# Patient Record
Sex: FEMALE | Race: BLACK OR AFRICAN AMERICAN | NOT HISPANIC OR LATINO | Employment: UNEMPLOYED | URBAN - METROPOLITAN AREA
[De-identification: names, ages, dates, MRNs, and addresses within clinical notes are randomized per-mention and may not be internally consistent; named-entity substitution may affect disease eponyms.]

---

## 2017-05-22 ENCOUNTER — HOSPITAL ENCOUNTER (EMERGENCY)
Facility: HOSPITAL | Age: 27
Discharge: HOME | End: 2017-05-22
Attending: EMERGENCY MEDICINE
Payer: MEDICAID

## 2017-05-22 VITALS
BODY MASS INDEX: 25.95 KG/M2 | OXYGEN SATURATION: 100 % | DIASTOLIC BLOOD PRESSURE: 76 MMHG | HEIGHT: 64 IN | TEMPERATURE: 98 F | HEART RATE: 81 BPM | WEIGHT: 152 LBS | SYSTOLIC BLOOD PRESSURE: 127 MMHG | RESPIRATION RATE: 18 BRPM

## 2017-05-22 DIAGNOSIS — O02.1 RETAINED PRODUCTS OF CONCEPTION, EARLY PREGNANCY: Primary | ICD-10-CM

## 2017-05-22 DIAGNOSIS — A59.9 TRICHOMONOSIS: ICD-10-CM

## 2017-05-22 LAB
ABO + RH BLD: NORMAL
ALBUMIN SERPL BCP-MCNC: 4 G/DL
ALP SERPL-CCNC: 55 U/L
ALT SERPL W/O P-5'-P-CCNC: 8 U/L
ANION GAP SERPL CALC-SCNC: 7 MMOL/L
AST SERPL-CCNC: 14 U/L
B-HCG UR QL: POSITIVE
BACTERIA GENITAL QL WET PREP: ABNORMAL
BASOPHILS # BLD AUTO: 0.01 K/UL
BASOPHILS NFR BLD: 0.3 %
BILIRUB SERPL-MCNC: 0.6 MG/DL
BUN SERPL-MCNC: 8 MG/DL
CALCIUM SERPL-MCNC: 9.4 MG/DL
CHLORIDE SERPL-SCNC: 105 MMOL/L
CLUE CELLS VAG QL WET PREP: ABNORMAL
CO2 SERPL-SCNC: 26 MMOL/L
CREAT SERPL-MCNC: 0.8 MG/DL
CTP QC/QA: YES
DIFFERENTIAL METHOD: ABNORMAL
EOSINOPHIL # BLD AUTO: 0.2 K/UL
EOSINOPHIL NFR BLD: 4.6 %
ERYTHROCYTE [DISTWIDTH] IN BLOOD BY AUTOMATED COUNT: 13.8 %
EST. GFR  (AFRICAN AMERICAN): >60 ML/MIN/1.73 M^2
EST. GFR  (NON AFRICAN AMERICAN): >60 ML/MIN/1.73 M^2
FILAMENT FUNGI VAG WET PREP-#/AREA: ABNORMAL
GLUCOSE SERPL-MCNC: 85 MG/DL
HCG INTACT+B SERPL-ACNC: 2831 MIU/ML
HCT VFR BLD AUTO: 37.8 %
HGB BLD-MCNC: 12.6 G/DL
LYMPHOCYTES # BLD AUTO: 1.3 K/UL
LYMPHOCYTES NFR BLD: 36.3 %
MCH RBC QN AUTO: 29.3 PG
MCHC RBC AUTO-ENTMCNC: 33.3 %
MCV RBC AUTO: 88 FL
MONOCYTES # BLD AUTO: 0.4 K/UL
MONOCYTES NFR BLD: 11.2 %
NEUTROPHILS # BLD AUTO: 1.7 K/UL
NEUTROPHILS NFR BLD: 47.6 %
PLATELET # BLD AUTO: 201 K/UL
PMV BLD AUTO: 12.4 FL
POTASSIUM SERPL-SCNC: 3.8 MMOL/L
PROT SERPL-MCNC: 6.9 G/DL
RBC # BLD AUTO: 4.3 M/UL
SODIUM SERPL-SCNC: 138 MMOL/L
SPECIMEN SOURCE: ABNORMAL
T VAGINALIS GENITAL QL WET PREP: ABNORMAL
WBC # BLD AUTO: 3.66 K/UL
WBC #/AREA VAG WET PREP: ABNORMAL
YEAST GENITAL QL WET PREP: ABNORMAL

## 2017-05-22 PROCEDURE — 86900 BLOOD TYPING SEROLOGIC ABO: CPT

## 2017-05-22 PROCEDURE — 99284 EMERGENCY DEPT VISIT MOD MDM: CPT

## 2017-05-22 PROCEDURE — 86901 BLOOD TYPING SEROLOGIC RH(D): CPT

## 2017-05-22 PROCEDURE — 81025 URINE PREGNANCY TEST: CPT | Performed by: EMERGENCY MEDICINE

## 2017-05-22 PROCEDURE — 85025 COMPLETE CBC W/AUTO DIFF WBC: CPT

## 2017-05-22 PROCEDURE — 87210 SMEAR WET MOUNT SALINE/INK: CPT

## 2017-05-22 PROCEDURE — 84702 CHORIONIC GONADOTROPIN TEST: CPT

## 2017-05-22 PROCEDURE — 80053 COMPREHEN METABOLIC PANEL: CPT

## 2017-05-22 PROCEDURE — 87591 N.GONORRHOEAE DNA AMP PROB: CPT

## 2017-05-22 RX ORDER — METRONIDAZOLE 500 MG/1
500 TABLET ORAL 3 TIMES DAILY
Qty: 21 TABLET | Refills: 0 | Status: SHIPPED | OUTPATIENT
Start: 2017-05-22 | End: 2017-05-29

## 2017-05-22 NOTE — ED PROVIDER NOTES
"Encounter Date: 2017    SCRIBE #1 NOTE: I, Garcia Linda, am scribing for, and in the presence of,  Geeta Ortiz NP. I have scribed the following portions of the note - Other sections scribed: PEPPER HPI.       History     Chief Complaint   Patient presents with    Miscarriage     " I passed my baby in the toilet May 3rd and I'm still bleeding and cramping and I took a pregancy test and it still says I'm pregnant."      Review of patient's allergies indicates:  No Known Allergies  CC: Abdominal pain    HPI: This 26 y.o. F, who has no past medical history, presents to the ED for evaluation of vaginal bleeding since 5/3 and intermittent, crampy/contraction-like suprapubic abdominal pain x1 week. She passed two large clots of tissue at onset and has been bleeding intermittently since. At home pregnancy test were postive so she assumed she was having a miscarriage. She is not sure how long she has been pregnant. She is A2. She denies shortness of breath, chest pain, fever, nausea, vomiting and headache.          History reviewed. No pertinent past medical history.  Past Surgical History:   Procedure Laterality Date    FRACTURE SURGERY      left leg    LEG SURGERY       Family History   Problem Relation Age of Onset    Hypertension Mother     Hypertension Maternal Grandmother     Kidney disease Maternal Grandmother     Diabetes Maternal Grandmother     Diabetes Paternal Grandmother     Hypertension Paternal Grandmother     Kidney disease Paternal Grandmother     Breast cancer Neg Hx     Colon cancer Neg Hx     Ovarian cancer Neg Hx      Social History   Substance Use Topics    Smoking status: Former Smoker     Packs/day: 0.50     Years: 6.00     Types: Cigarettes    Smokeless tobacco: Former User     Quit date: 2014      Comment: Pt smokes 4 cigarettes per day.    Alcohol use No      Comment: sometimes     Review of Systems   Constitutional: Negative for fever.   HENT: Negative for sore " throat.    Eyes: Negative for visual disturbance.   Respiratory: Negative for shortness of breath.    Cardiovascular: Negative for chest pain.   Gastrointestinal: Positive for abdominal pain (lower). Negative for diarrhea, nausea and vomiting.   Genitourinary: Positive for vaginal bleeding. Negative for dysuria.   Musculoskeletal: Negative for back pain.   Skin: Negative for rash.   Neurological: Negative for headaches.       Physical Exam     Initial Vitals [05/22/17 1152]   BP Pulse Resp Temp SpO2   116/71 77 17 98.1 °F (36.7 °C) 99 %     Physical Exam    Nursing note and vitals reviewed.  Constitutional: She appears well-developed and well-nourished.   HENT:   Head: Normocephalic.   Eyes: Conjunctivae are normal.   Neck: Normal range of motion. Neck supple.   Abdominal: Soft. There is no tenderness.   Genitourinary: Vagina normal and uterus normal.   Genitourinary Comments: Os closed. (-)  Cervical motion tenderness.  No adnexal masses no tenderness.   Musculoskeletal: Normal range of motion.   Neurological: She is alert and oriented to person, place, and time.   Skin: Skin is warm and dry. Capillary refill takes less than 2 seconds.         ED Course   Procedures  Labs Reviewed   CBC W/ AUTO DIFFERENTIAL - Abnormal; Notable for the following:        Result Value    WBC 3.66 (*)     Gran # 1.7 (*)     All other components within normal limits   COMPREHENSIVE METABOLIC PANEL - Abnormal; Notable for the following:     ALT 8 (*)     Anion Gap 7 (*)     All other components within normal limits   VAGINAL SCREEN - Abnormal; Notable for the following:     Trichomonas Moderate (*)     Clue Cells, Wet Prep Moderate (*)     WBC - Vaginal Screen Few (*)     Bacteria - Vaginal Screen Moderate (*)     All other components within normal limits   POCT URINE PREGNANCY - Abnormal; Notable for the following:     POC Preg Test, Ur Positive (*)     All other components within normal limits   C. TRACHOMATIS/N. GONORRHOEAE BY AMP DNA    HCG, QUANTITATIVE, PREGNANCY   GROUP & RH             Medical Decision Making:   Initial Assessment:   26-year-old female presents with continued cramping and vaginal bleeding intermittently since May 3.  Patient continues to have positive pregnancy test  Differential Diagnosis:   Missed AB  Retained products  Threatened AB  ED Management:  Pts exam was unremarkable; pt does not appear ill or toxic, pt is afebrile with normal VS, os was closed there was no adnexal masses or tenderness.  Uterus was normal size.    Labs and ultrasound were reviewed.     Based on exam today I believe patient had a missed AB with retained products.  She needs to follow-up with her OB/GYN for continued management.    I will put patient on Flagyl for the Trichomonas and BV.    Patient verbalizes understanding to include follow-up with OB/GYN for management. patient will return for worsening symptoms or any other concerns    Case discussed with attending who agrees with assessment and plan.               Scribe Attestation:   Scribe #1: I performed the above scribed service and the documentation accurately describes the services I performed. I attest to the accuracy of the note.    Attending Attestation:     Physician Attestation Statement for NP/PA:   I discussed this assessment and plan of this patient with the NP/PA, but I did not personally examine the patient. The face to face encounter was performed by the NP/PA.    Other NP/PA Attestation Additions:      Medical Decision Makin-year-old female presents for evaluation of vaginal bleeding for 2 weeks of cramping abdominal pain for the last week.  Physical examination reveals a benign abdomen, normal vital signs, well-appearing patient.  Her urine pregnancy test is positive in the emergency department.  Will evaluate for spontaneous AB and ectopic pregnancy.     I have personally reviewed and interpreted the patient's ultrasound and there is no intrauterine pregnancy but appears  to be retained products of conception within the uterus.    Wet prep positive for moderate clue cells and moderate trichomonas.  Will treat with Flagyl and recommend follow-up with OB/GYN for probable retained products of conception.  No evidence of endometritis or significant anemia.       Physician Attestation for Scribe:  Physician Attestation Statement for Scribe #1: I, Geeta Ortiz NP, reviewed documentation, as scribed by Garcia Linda in my presence, and it is both accurate and complete.                 ED Course   Value Comment By Time   US OB Less Than 14 Wks with Transvag(xpd (Reviewed) Geeta Ortiz NP 05/22 1530     Clinical Impression:   The primary encounter diagnosis was Retained products of conception, early pregnancy. A diagnosis of Trichomonosis was also pertinent to this visit.    Disposition:   Disposition: Discharged  Condition: Stable       Geeta Ortiz NP  05/22/17 1607       Indra Ayala III, MD  05/22/17 1829

## 2017-05-22 NOTE — DISCHARGE INSTRUCTIONS
You may need a D&C.  You need to follow up with your OB/GYN in the next day or two for him to follow your progress.

## 2017-05-22 NOTE — ED TRIAGE NOTES
"Patient came in PER personal transport with bleeding and cramping with blood clots after having a miscarriage on May 3rd. "  "

## 2017-05-23 LAB
C TRACH DNA SPEC QL NAA+PROBE: NOT DETECTED
N GONORRHOEA DNA SPEC QL NAA+PROBE: NOT DETECTED

## 2017-06-12 ENCOUNTER — HOSPITAL ENCOUNTER (INPATIENT)
Facility: HOSPITAL | Age: 27
LOS: 1 days | Discharge: LEFT BEFORE DISCHARGE | DRG: 770 | End: 2017-06-13
Attending: EMERGENCY MEDICINE | Admitting: OBSTETRICS & GYNECOLOGY
Payer: MEDICAID

## 2017-06-12 ENCOUNTER — ANESTHESIA EVENT (OUTPATIENT)
Dept: SURGERY | Facility: HOSPITAL | Age: 27
DRG: 770 | End: 2017-06-12
Payer: MEDICAID

## 2017-06-12 ENCOUNTER — SURGERY (OUTPATIENT)
Age: 27
End: 2017-06-12

## 2017-06-12 ENCOUNTER — ANESTHESIA (OUTPATIENT)
Dept: SURGERY | Facility: HOSPITAL | Age: 27
DRG: 770 | End: 2017-06-12
Payer: MEDICAID

## 2017-06-12 DIAGNOSIS — R74.01 ELEVATED TRANSAMINASE LEVEL: ICD-10-CM

## 2017-06-12 DIAGNOSIS — O03.87 ABORTION WITH SEPTICEMIA: Primary | ICD-10-CM

## 2017-06-12 LAB
ALBUMIN SERPL BCP-MCNC: 3.8 G/DL
ALP SERPL-CCNC: 58 U/L
ALT SERPL W/O P-5'-P-CCNC: 59 U/L
ANION GAP SERPL CALC-SCNC: 12 MMOL/L
AST SERPL-CCNC: 60 U/L
BASOPHILS # BLD AUTO: 0.01 K/UL
BASOPHILS NFR BLD: 0.1 %
BILIRUB SERPL-MCNC: 0.9 MG/DL
BUN SERPL-MCNC: 8 MG/DL
CALCIUM SERPL-MCNC: 9.3 MG/DL
CHLORIDE SERPL-SCNC: 98 MMOL/L
CO2 SERPL-SCNC: 22 MMOL/L
CREAT SERPL-MCNC: 0.9 MG/DL
DIFFERENTIAL METHOD: ABNORMAL
EOSINOPHIL # BLD AUTO: 0 K/UL
EOSINOPHIL NFR BLD: 0 %
ERYTHROCYTE [DISTWIDTH] IN BLOOD BY AUTOMATED COUNT: 13.6 %
EST. GFR  (AFRICAN AMERICAN): >60 ML/MIN/1.73 M^2
EST. GFR  (NON AFRICAN AMERICAN): >60 ML/MIN/1.73 M^2
GLUCOSE SERPL-MCNC: 129 MG/DL
HCT VFR BLD AUTO: 40.5 %
HGB BLD-MCNC: 13.7 G/DL
INR PPP: 1.3
LYMPHOCYTES # BLD AUTO: 0.5 K/UL
LYMPHOCYTES NFR BLD: 6.8 %
MCH RBC QN AUTO: 28.8 PG
MCHC RBC AUTO-ENTMCNC: 33.8 %
MCV RBC AUTO: 85 FL
MONOCYTES # BLD AUTO: 0.5 K/UL
MONOCYTES NFR BLD: 7.1 %
NEUTROPHILS # BLD AUTO: 6 K/UL
NEUTROPHILS NFR BLD: 86 %
PLATELET # BLD AUTO: 143 K/UL
PMV BLD AUTO: 12 FL
POTASSIUM SERPL-SCNC: 3.7 MMOL/L
PROT SERPL-MCNC: 7.9 G/DL
PROTHROMBIN TIME: 13.6 SEC
RBC # BLD AUTO: 4.76 M/UL
SODIUM SERPL-SCNC: 132 MMOL/L
WBC # BLD AUTO: 7.07 K/UL

## 2017-06-12 PROCEDURE — 63600175 PHARM REV CODE 636 W HCPCS: Performed by: OBSTETRICS & GYNECOLOGY

## 2017-06-12 PROCEDURE — 37000009 HC ANESTHESIA EA ADD 15 MINS: Performed by: OBSTETRICS & GYNECOLOGY

## 2017-06-12 PROCEDURE — 71000033 HC RECOVERY, INTIAL HOUR: Performed by: OBSTETRICS & GYNECOLOGY

## 2017-06-12 PROCEDURE — D9220A PRA ANESTHESIA: Mod: ANES,,, | Performed by: ANESTHESIOLOGY

## 2017-06-12 PROCEDURE — 88305 TISSUE EXAM BY PATHOLOGIST: CPT | Performed by: PATHOLOGY

## 2017-06-12 PROCEDURE — 11000001 HC ACUTE MED/SURG PRIVATE ROOM

## 2017-06-12 PROCEDURE — 63600175 PHARM REV CODE 636 W HCPCS: Performed by: NURSE ANESTHETIST, CERTIFIED REGISTERED

## 2017-06-12 PROCEDURE — 85610 PROTHROMBIN TIME: CPT

## 2017-06-12 PROCEDURE — 99284 EMERGENCY DEPT VISIT MOD MDM: CPT

## 2017-06-12 PROCEDURE — 80053 COMPREHEN METABOLIC PANEL: CPT

## 2017-06-12 PROCEDURE — 36000704 HC OR TIME LEV I 1ST 15 MIN: Performed by: OBSTETRICS & GYNECOLOGY

## 2017-06-12 PROCEDURE — 37000008 HC ANESTHESIA 1ST 15 MINUTES: Performed by: OBSTETRICS & GYNECOLOGY

## 2017-06-12 PROCEDURE — 25000003 PHARM REV CODE 250: Performed by: NURSE ANESTHETIST, CERTIFIED REGISTERED

## 2017-06-12 PROCEDURE — 85025 COMPLETE CBC W/AUTO DIFF WBC: CPT

## 2017-06-12 PROCEDURE — 25000003 PHARM REV CODE 250: Performed by: OBSTETRICS & GYNECOLOGY

## 2017-06-12 PROCEDURE — 88305 TISSUE EXAM BY PATHOLOGIST: CPT | Mod: 26,,, | Performed by: PATHOLOGY

## 2017-06-12 PROCEDURE — D9220A PRA ANESTHESIA: Mod: CRNA,,, | Performed by: NURSE ANESTHETIST, CERTIFIED REGISTERED

## 2017-06-12 PROCEDURE — 36000705 HC OR TIME LEV I EA ADD 15 MIN: Performed by: OBSTETRICS & GYNECOLOGY

## 2017-06-12 PROCEDURE — 10D17ZZ EXTRACTION OF PRODUCTS OF CONCEPTION, RETAINED, VIA NATURAL OR ARTIFICIAL OPENING: ICD-10-PCS | Performed by: OBSTETRICS & GYNECOLOGY

## 2017-06-12 RX ORDER — LIDOCAINE HCL/PF 100 MG/5ML
SYRINGE (ML) INTRAVENOUS
Status: DISCONTINUED | OUTPATIENT
Start: 2017-06-12 | End: 2017-06-12

## 2017-06-12 RX ORDER — SUCCINYLCHOLINE CHLORIDE 20 MG/ML
INJECTION INTRAMUSCULAR; INTRAVENOUS
Status: DISCONTINUED | OUTPATIENT
Start: 2017-06-12 | End: 2017-06-12

## 2017-06-12 RX ORDER — SODIUM CHLORIDE 0.9 % (FLUSH) 0.9 %
3 SYRINGE (ML) INJECTION
Status: DISCONTINUED | OUTPATIENT
Start: 2017-06-12 | End: 2017-06-12 | Stop reason: HOSPADM

## 2017-06-12 RX ORDER — OXYCODONE AND ACETAMINOPHEN 10; 325 MG/1; MG/1
1 TABLET ORAL EVERY 4 HOURS PRN
Status: DISCONTINUED | OUTPATIENT
Start: 2017-06-12 | End: 2017-06-13 | Stop reason: HOSPADM

## 2017-06-12 RX ORDER — HYDROMORPHONE HYDROCHLORIDE 2 MG/ML
0.5 INJECTION, SOLUTION INTRAMUSCULAR; INTRAVENOUS; SUBCUTANEOUS EVERY 4 HOURS PRN
Status: DISCONTINUED | OUTPATIENT
Start: 2017-06-12 | End: 2017-06-12

## 2017-06-12 RX ORDER — METOCLOPRAMIDE HYDROCHLORIDE 5 MG/ML
INJECTION INTRAMUSCULAR; INTRAVENOUS
Status: DISCONTINUED | OUTPATIENT
Start: 2017-06-12 | End: 2017-06-12

## 2017-06-12 RX ORDER — PROPOFOL 10 MG/ML
VIAL (ML) INTRAVENOUS
Status: DISCONTINUED | OUTPATIENT
Start: 2017-06-12 | End: 2017-06-12

## 2017-06-12 RX ORDER — ROCURONIUM BROMIDE 10 MG/ML
INJECTION, SOLUTION INTRAVENOUS
Status: DISCONTINUED | OUTPATIENT
Start: 2017-06-12 | End: 2017-06-12

## 2017-06-12 RX ORDER — FENTANYL CITRATE 50 UG/ML
INJECTION, SOLUTION INTRAMUSCULAR; INTRAVENOUS
Status: DISCONTINUED | OUTPATIENT
Start: 2017-06-12 | End: 2017-06-12

## 2017-06-12 RX ORDER — DIPHENHYDRAMINE HYDROCHLORIDE 50 MG/ML
25 INJECTION INTRAMUSCULAR; INTRAVENOUS EVERY 4 HOURS PRN
Status: DISCONTINUED | OUTPATIENT
Start: 2017-06-12 | End: 2017-06-13 | Stop reason: HOSPADM

## 2017-06-12 RX ORDER — HYDROCODONE BITARTRATE AND ACETAMINOPHEN 5; 325 MG/1; MG/1
1 TABLET ORAL EVERY 4 HOURS PRN
Status: DISCONTINUED | OUTPATIENT
Start: 2017-06-12 | End: 2017-06-13 | Stop reason: HOSPADM

## 2017-06-12 RX ORDER — GLYCOPYRROLATE 0.2 MG/ML
INJECTION INTRAMUSCULAR; INTRAVENOUS
Status: DISCONTINUED | OUTPATIENT
Start: 2017-06-12 | End: 2017-06-12

## 2017-06-12 RX ORDER — MIDAZOLAM HYDROCHLORIDE 1 MG/ML
INJECTION, SOLUTION INTRAMUSCULAR; INTRAVENOUS
Status: DISCONTINUED | OUTPATIENT
Start: 2017-06-12 | End: 2017-06-12

## 2017-06-12 RX ORDER — ONDANSETRON 2 MG/ML
4 INJECTION INTRAMUSCULAR; INTRAVENOUS EVERY 8 HOURS PRN
Status: DISCONTINUED | OUTPATIENT
Start: 2017-06-12 | End: 2017-06-12 | Stop reason: HOSPADM

## 2017-06-12 RX ORDER — ACETAMINOPHEN 325 MG/1
650 TABLET ORAL EVERY 6 HOURS PRN
Status: DISCONTINUED | OUTPATIENT
Start: 2017-06-12 | End: 2017-06-12

## 2017-06-12 RX ORDER — CEFOXITIN SODIUM 2 G/50ML
2 INJECTION, SOLUTION INTRAVENOUS
Status: DISCONTINUED | OUTPATIENT
Start: 2017-06-12 | End: 2017-06-13 | Stop reason: HOSPADM

## 2017-06-12 RX ORDER — HYDROMORPHONE HYDROCHLORIDE 2 MG/ML
0.2 INJECTION, SOLUTION INTRAMUSCULAR; INTRAVENOUS; SUBCUTANEOUS EVERY 5 MIN PRN
Status: DISCONTINUED | OUTPATIENT
Start: 2017-06-12 | End: 2017-06-12 | Stop reason: HOSPADM

## 2017-06-12 RX ORDER — DIPHENHYDRAMINE HCL 25 MG
25 CAPSULE ORAL EVERY 4 HOURS PRN
Status: DISCONTINUED | OUTPATIENT
Start: 2017-06-12 | End: 2017-06-13 | Stop reason: HOSPADM

## 2017-06-12 RX ORDER — SODIUM CHLORIDE 9 MG/ML
INJECTION, SOLUTION INTRAVENOUS CONTINUOUS
Status: DISCONTINUED | OUTPATIENT
Start: 2017-06-12 | End: 2017-06-12

## 2017-06-12 RX ORDER — SODIUM CHLORIDE, SODIUM LACTATE, POTASSIUM CHLORIDE, CALCIUM CHLORIDE 600; 310; 30; 20 MG/100ML; MG/100ML; MG/100ML; MG/100ML
INJECTION, SOLUTION INTRAVENOUS CONTINUOUS PRN
Status: DISCONTINUED | OUTPATIENT
Start: 2017-06-12 | End: 2017-06-12

## 2017-06-12 RX ORDER — DIPHENHYDRAMINE HYDROCHLORIDE 50 MG/ML
25 INJECTION INTRAMUSCULAR; INTRAVENOUS EVERY 6 HOURS PRN
Status: DISCONTINUED | OUTPATIENT
Start: 2017-06-12 | End: 2017-06-12 | Stop reason: HOSPADM

## 2017-06-12 RX ORDER — ONDANSETRON 8 MG/1
8 TABLET, ORALLY DISINTEGRATING ORAL EVERY 8 HOURS PRN
Status: DISCONTINUED | OUTPATIENT
Start: 2017-06-12 | End: 2017-06-13 | Stop reason: HOSPADM

## 2017-06-12 RX ORDER — HYDROMORPHONE HYDROCHLORIDE 2 MG/ML
1 INJECTION, SOLUTION INTRAMUSCULAR; INTRAVENOUS; SUBCUTANEOUS EVERY 4 HOURS PRN
Status: DISCONTINUED | OUTPATIENT
Start: 2017-06-12 | End: 2017-06-12

## 2017-06-12 RX ORDER — KETOROLAC TROMETHAMINE 30 MG/ML
30 INJECTION, SOLUTION INTRAMUSCULAR; INTRAVENOUS EVERY 6 HOURS
Status: DISCONTINUED | OUTPATIENT
Start: 2017-06-13 | End: 2017-06-13 | Stop reason: HOSPADM

## 2017-06-12 RX ORDER — SODIUM CHLORIDE 0.9 % (FLUSH) 0.9 %
3 SYRINGE (ML) INJECTION EVERY 8 HOURS
Status: DISCONTINUED | OUTPATIENT
Start: 2017-06-12 | End: 2017-06-12

## 2017-06-12 RX ORDER — HEPARIN SODIUM 5000 [USP'U]/ML
5000 INJECTION, SOLUTION INTRAVENOUS; SUBCUTANEOUS EVERY 8 HOURS
Status: DISCONTINUED | OUTPATIENT
Start: 2017-06-12 | End: 2017-06-12

## 2017-06-12 RX ORDER — AMOXICILLIN 250 MG
1 CAPSULE ORAL 2 TIMES DAILY
Status: DISCONTINUED | OUTPATIENT
Start: 2017-06-12 | End: 2017-06-12

## 2017-06-12 RX ORDER — FAMOTIDINE 20 MG/1
20 TABLET, FILM COATED ORAL DAILY
Status: DISCONTINUED | OUTPATIENT
Start: 2017-06-13 | End: 2017-06-12

## 2017-06-12 RX ORDER — ONDANSETRON 2 MG/ML
4 INJECTION INTRAMUSCULAR; INTRAVENOUS EVERY 12 HOURS PRN
Status: DISCONTINUED | OUTPATIENT
Start: 2017-06-12 | End: 2017-06-12

## 2017-06-12 RX ORDER — SODIUM CHLORIDE, SODIUM LACTATE, POTASSIUM CHLORIDE, CALCIUM CHLORIDE 600; 310; 30; 20 MG/100ML; MG/100ML; MG/100ML; MG/100ML
INJECTION, SOLUTION INTRAVENOUS CONTINUOUS
Status: DISCONTINUED | OUTPATIENT
Start: 2017-06-12 | End: 2017-06-13 | Stop reason: HOSPADM

## 2017-06-12 RX ORDER — IBUPROFEN 600 MG/1
600 TABLET ORAL EVERY 6 HOURS PRN
Status: DISCONTINUED | OUTPATIENT
Start: 2017-06-12 | End: 2017-06-13 | Stop reason: HOSPADM

## 2017-06-12 RX ADMIN — PROPOFOL 120 MG: 10 INJECTION, EMULSION INTRAVENOUS at 05:06

## 2017-06-12 RX ADMIN — LIDOCAINE HYDROCHLORIDE 100 MG: 20 INJECTION, SOLUTION INTRAVENOUS at 05:06

## 2017-06-12 RX ADMIN — GLYCOPYRROLATE 0.2 MG: 0.2 INJECTION, SOLUTION INTRAMUSCULAR; INTRAVENOUS at 05:06

## 2017-06-12 RX ADMIN — DOXYCYCLINE 100 MG: 100 INJECTION, POWDER, LYOPHILIZED, FOR SOLUTION INTRAVENOUS at 09:06

## 2017-06-12 RX ADMIN — SODIUM CHLORIDE, SODIUM LACTATE, POTASSIUM CHLORIDE, AND CALCIUM CHLORIDE: .6; .31; .03; .02 INJECTION, SOLUTION INTRAVENOUS at 05:06

## 2017-06-12 RX ADMIN — FENTANYL CITRATE 100 MCG: 50 INJECTION INTRAMUSCULAR; INTRAVENOUS at 05:06

## 2017-06-12 RX ADMIN — SUCCINYLCHOLINE CHLORIDE 120 MG: 20 INJECTION, SOLUTION INTRAMUSCULAR; INTRAVENOUS at 05:06

## 2017-06-12 RX ADMIN — ROCURONIUM BROMIDE 5 MG: 10 INJECTION, SOLUTION INTRAVENOUS at 05:06

## 2017-06-12 RX ADMIN — CEFOXITIN SODIUM 2 G: 2 INJECTION, SOLUTION INTRAVENOUS at 08:06

## 2017-06-12 RX ADMIN — METOCLOPRAMIDE 10 MG: 5 INJECTION, SOLUTION INTRAMUSCULAR; INTRAVENOUS at 05:06

## 2017-06-12 RX ADMIN — SODIUM CHLORIDE, SODIUM LACTATE, POTASSIUM CHLORIDE, AND CALCIUM CHLORIDE: .6; .31; .03; .02 INJECTION, SOLUTION INTRAVENOUS at 08:06

## 2017-06-12 RX ADMIN — MIDAZOLAM HYDROCHLORIDE 2 MG: 1 INJECTION, SOLUTION INTRAMUSCULAR; INTRAVENOUS at 05:06

## 2017-06-12 NOTE — TRANSFER OF CARE
"Anesthesia Transfer of Care Note    Patient: Carolyn Pearson    Procedure(s) Performed: Procedure(s) (LRB):  D & C (SUCTION) (N/A)    Patient location: PACU    Anesthesia Type: general    Transport from OR: Transported from OR on room air with adequate spontaneous ventilation    Post pain: adequate analgesia    Post assessment: no apparent anesthetic complications    Post vital signs: stable    Level of consciousness: awake    Nausea/Vomiting: no nausea/vomiting    Complications: none    Transfer of care protocol was followed      Last vitals:   Visit Vitals  /60 (BP Location: Left arm, Patient Position: Lying, BP Method: Automatic)   Pulse 97   Temp (!) 38.2 °C (100.8 °F) (Oral)   Resp 18   Ht 5' 4" (1.626 m)   Wt 68 kg (150 lb)   SpO2 100%   BMI 25.75 kg/m²     "

## 2017-06-12 NOTE — ED TRIAGE NOTES
Sent here from Dr Nunez office for a D+C last had water about 2 pm says she hasn't eaten had had a miscarriage

## 2017-06-12 NOTE — ED PROVIDER NOTES
Encounter Date: 2017    SCRIBE #1 NOTE: I, Kaitlyn Torres, am scribing for, and in the presence of,  Epi Liang PA-C. I have scribed the following portions of the note - Other sections scribed: HPI and ROS.       History     Chief Complaint   Patient presents with    Other     sent from Dr. Tao's office for D&C     Review of patient's allergies indicates:  No Known Allergies  CC: Female  Problem    HPI: This A2 26 y.o. Female presents to the ED for an evaluation for a female  problem after being advised by her OB/Gyn to have an emergent D&C today.  Patient reports being evaluated at Dr. Tao's office and reports being informed that she has retained products of conception.  Patient reports she was informed to have a D&C due to developing an infection.  Patient reports she has been having a constant, 10/10 lower abdominal pain, nausea, and emesis.  Patient also reports of vaginal bleeding with large blood clots since May. Today with fever (103 temp max), and generalized weakness.  Patient denies back pain, chest pain, shortness of breath, dysuria, or any other associated symptoms.  No prior tx.  No alleviating factors.      The history is provided by the patient. No  was used.     History reviewed. No pertinent past medical history.  Past Surgical History:   Procedure Laterality Date    FRACTURE SURGERY      left leg    LEG SURGERY       Family History   Problem Relation Age of Onset    Hypertension Mother     Hypertension Maternal Grandmother     Kidney disease Maternal Grandmother     Diabetes Maternal Grandmother     Diabetes Paternal Grandmother     Hypertension Paternal Grandmother     Kidney disease Paternal Grandmother     Breast cancer Neg Hx     Colon cancer Neg Hx     Ovarian cancer Neg Hx      Social History   Substance Use Topics    Smoking status: Former Smoker     Packs/day: 0.50     Years: 6.00     Types: Cigarettes    Smokeless tobacco: Former User      Quit date: 8/31/2014      Comment: Pt smokes 4 cigarettes per day.    Alcohol use No      Comment: sometimes     Review of Systems   Constitutional: Negative for chills and fever.   HENT: Negative for ear pain and sore throat.    Eyes: Negative for pain.   Respiratory: Negative for cough and shortness of breath.    Cardiovascular: Negative for chest pain.   Gastrointestinal: Positive for abdominal pain, nausea and vomiting. Negative for diarrhea.   Genitourinary: Positive for vaginal bleeding. Negative for dysuria.   Musculoskeletal: Negative for back pain.   Skin: Negative for rash.   Neurological: Positive for weakness and headaches.       Physical Exam     Initial Vitals [06/12/17 1626]   BP Pulse Resp Temp SpO2   111/66 108 20 100.1 °F (37.8 °C) 98 %     Physical Exam    Vitals reviewed.  Constitutional: She appears well-developed and well-nourished. She is not diaphoretic. No distress.   Appears in obvious discomfort.   HENT:   Head: Normocephalic and atraumatic.   Right Ear: External ear normal.   Left Ear: External ear normal.   Nose: Nose normal.   Eyes: Conjunctivae are normal. No scleral icterus.   Neck: Normal range of motion. Neck supple.   Cardiovascular: Normal rate, regular rhythm, normal heart sounds and intact distal pulses.   Pulmonary/Chest: Breath sounds normal. No respiratory distress. She has no wheezes. She has no rhonchi. She has no rales. She exhibits no tenderness.   Abdominal: Soft. She exhibits no distension and no mass. There is tenderness (diffuse, greatest in the lower abdomen). There is guarding. There is no rebound.   Genitourinary:   Genitourinary Comments: Pelvic exam deferred for expediting care   Musculoskeletal: Normal range of motion.   Neurological: She is alert and oriented to person, place, and time.   Skin: Skin is warm and dry. Capillary refill takes less than 2 seconds. No rash noted.         ED Course   Procedures  Labs Reviewed   PROTIME-INR - Abnormal; Notable for  the following:        Result Value    Prothrombin Time 13.6 (*)     INR 1.3 (*)     All other components within normal limits             Medical Decision Making:   Initial Assessment:   26-year-old female presents directly from OB/GYN office complaining of headache, abdominal pain, nausea today, as well as of vaginal bleeding since last month.  Patient was informed she had a miscarriage with retained products and is here for D&C.  Differential Diagnosis:   Septic , peritonitis, severe sepsis  ED Management:  Patient presents in obvious discomfort, alert and oriented, afebrile (100.1) and tachycardic (108). Patient normotensive at time of exam. Abdomen with diffuse tenderness particularly in the lower region.  There is guarding.  Pelvic exam deferred in order to expedite patient's care to the OR.  IV established and labs drawn in the ED and patient transferred to OB/GYN care for suction D&C.  Dr. Stark had physician to physician discussion with Dr. Mcnamara, who is expecting this patient.              Scribe Attestation:   Scribe #1: I performed the above scribed service and the documentation accurately describes the services I performed. I attest to the accuracy of the note.    Attending Attestation:           Physician Attestation for Scribe:  Physician Attestation Statement for Scribe #1: I, Epi Liang PA-C, reviewed documentation, as scribed by Kaitlyn Torres in my presence, and it is both accurate and complete.                 ED Course     Clinical Impression:   The encounter diagnosis was  with septicemia.          Epi Liang PA-C  [id number]

## 2017-06-12 NOTE — OP NOTE
Preop Dx:  septic     Postop Dx: same    Surgeon:  maryann wesley    Findings: small amount products on conception, to pathology    Procedure: suction dilation and curettage    Complications: none    EBL: minimal    Anesthesia general      Patient was prepped and draped in usual sterile fashion. Bladder was drained.  A speculum was placed and cervix grasped with a tenaculum and gently dilated to allow a size 8   curved suction curette.  Uterus was evacuated with suction.  Gentle sharp curettage was performed and repeat suction.  The uterus was confirmed to be empty.  All instruments were removed from vagina and good hemostasis noted.   All counts were correct x 2.  Patient taken to recovery in stable condition.    Complications: none

## 2017-06-13 VITALS
WEIGHT: 150 LBS | HEART RATE: 72 BPM | DIASTOLIC BLOOD PRESSURE: 61 MMHG | RESPIRATION RATE: 18 BRPM | TEMPERATURE: 96 F | HEIGHT: 64 IN | SYSTOLIC BLOOD PRESSURE: 106 MMHG | OXYGEN SATURATION: 99 % | BODY MASS INDEX: 25.61 KG/M2

## 2017-06-13 PROBLEM — O03.87 ABORTION WITH SEPTICEMIA: Status: RESOLVED | Noted: 2017-06-12 | Resolved: 2017-06-13

## 2017-06-13 LAB
ALBUMIN SERPL BCP-MCNC: 3.1 G/DL
ALP SERPL-CCNC: 47 U/L
ALT SERPL W/O P-5'-P-CCNC: 44 U/L
ANION GAP SERPL CALC-SCNC: 7 MMOL/L
APAP SERPL-MCNC: <3 UG/ML
AST SERPL-CCNC: 42 U/L
BASOPHILS # BLD AUTO: 0.01 K/UL
BASOPHILS NFR BLD: 0.3 %
BILIRUB SERPL-MCNC: 0.7 MG/DL
BUN SERPL-MCNC: 8 MG/DL
CALCIUM SERPL-MCNC: 8.9 MG/DL
CHLORIDE SERPL-SCNC: 101 MMOL/L
CO2 SERPL-SCNC: 27 MMOL/L
CREAT SERPL-MCNC: 0.9 MG/DL
DIFFERENTIAL METHOD: ABNORMAL
EOSINOPHIL # BLD AUTO: 0 K/UL
EOSINOPHIL NFR BLD: 0 %
ERYTHROCYTE [DISTWIDTH] IN BLOOD BY AUTOMATED COUNT: 13.7 %
EST. GFR  (AFRICAN AMERICAN): >60 ML/MIN/1.73 M^2
EST. GFR  (NON AFRICAN AMERICAN): >60 ML/MIN/1.73 M^2
GLUCOSE SERPL-MCNC: 108 MG/DL
HCT VFR BLD AUTO: 36.6 %
HGB BLD-MCNC: 12.4 G/DL
INR PPP: 1.1
LYMPHOCYTES # BLD AUTO: 0.7 K/UL
LYMPHOCYTES NFR BLD: 18.3 %
MCH RBC QN AUTO: 29.2 PG
MCHC RBC AUTO-ENTMCNC: 33.9 %
MCV RBC AUTO: 86 FL
MONOCYTES # BLD AUTO: 0.6 K/UL
MONOCYTES NFR BLD: 14.7 %
NEUTROPHILS # BLD AUTO: 2.6 K/UL
NEUTROPHILS NFR BLD: 66.7 %
PLATELET # BLD AUTO: 115 K/UL
PMV BLD AUTO: 11.9 FL
POTASSIUM SERPL-SCNC: 3.8 MMOL/L
PROT SERPL-MCNC: 6.4 G/DL
PROTHROMBIN TIME: 12 SEC
RBC # BLD AUTO: 4.25 M/UL
SODIUM SERPL-SCNC: 135 MMOL/L
WBC # BLD AUTO: 3.87 K/UL

## 2017-06-13 PROCEDURE — 85025 COMPLETE CBC W/AUTO DIFF WBC: CPT

## 2017-06-13 PROCEDURE — 63600175 PHARM REV CODE 636 W HCPCS: Performed by: OBSTETRICS & GYNECOLOGY

## 2017-06-13 PROCEDURE — 80053 COMPREHEN METABOLIC PANEL: CPT

## 2017-06-13 PROCEDURE — 80329 ANALGESICS NON-OPIOID 1 OR 2: CPT

## 2017-06-13 PROCEDURE — 25000003 PHARM REV CODE 250: Performed by: OBSTETRICS & GYNECOLOGY

## 2017-06-13 PROCEDURE — 85610 PROTHROMBIN TIME: CPT

## 2017-06-13 PROCEDURE — 36415 COLL VENOUS BLD VENIPUNCTURE: CPT

## 2017-06-13 PROCEDURE — 80074 ACUTE HEPATITIS PANEL: CPT

## 2017-06-13 RX ORDER — DOXYCYCLINE 100 MG/1
100 CAPSULE ORAL 2 TIMES DAILY
Qty: 14 CAPSULE | Refills: 0 | Status: SHIPPED | OUTPATIENT
Start: 2017-06-13 | End: 2017-06-20

## 2017-06-13 RX ORDER — ACETAMINOPHEN 325 MG/1
650 TABLET ORAL EVERY 6 HOURS PRN
Status: DISCONTINUED | OUTPATIENT
Start: 2017-06-13 | End: 2017-06-13 | Stop reason: HOSPADM

## 2017-06-13 RX ORDER — CEPHALEXIN 500 MG/1
500 CAPSULE ORAL 4 TIMES DAILY
Qty: 28 CAPSULE | Refills: 0 | Status: SHIPPED | OUTPATIENT
Start: 2017-06-13 | End: 2017-06-20

## 2017-06-13 RX ADMIN — CEFOXITIN SODIUM 2 G: 2 INJECTION, SOLUTION INTRAVENOUS at 07:06

## 2017-06-13 RX ADMIN — KETOROLAC TROMETHAMINE 30 MG: 30 INJECTION, SOLUTION INTRAMUSCULAR at 05:06

## 2017-06-13 RX ADMIN — DOXYCYCLINE 100 MG: 100 INJECTION, POWDER, LYOPHILIZED, FOR SOLUTION INTRAVENOUS at 08:06

## 2017-06-13 RX ADMIN — KETOROLAC TROMETHAMINE 30 MG: 30 INJECTION, SOLUTION INTRAMUSCULAR at 12:06

## 2017-06-13 RX ADMIN — CEFOXITIN SODIUM 2 G: 2 INJECTION, SOLUTION INTRAVENOUS at 02:06

## 2017-06-13 NOTE — H&P
26 y.o.  presents for pre-op H&P for suction d+c for septic ab.  No LMP recorded. Patient is not currently having periods (Reason: Other). pt has dropping hcg, retained poc on pelvic u/s, and temp 103.2    History reviewed. No pertinent past medical history.  Past Surgical History:   Procedure Laterality Date    FRACTURE SURGERY      left leg    LEG SURGERY       Family History   Problem Relation Age of Onset    Hypertension Mother     Hypertension Maternal Grandmother     Kidney disease Maternal Grandmother     Diabetes Maternal Grandmother     Diabetes Paternal Grandmother     Hypertension Paternal Grandmother     Kidney disease Paternal Grandmother     Breast cancer Neg Hx     Colon cancer Neg Hx     Ovarian cancer Neg Hx      Review of patient's allergies indicates:  No Known Allergies    Current Facility-Administered Medications:     ceFOXItin 2 g/50ml Dextrose IVPB, 2 g, Intravenous, Q6H, Levy Mcnamara MD, Last Rate: 100 mL/hr at 17, 2 g at 17    diphenhydrAMINE capsule 25 mg, 25 mg, Oral, Q4H PRN, Levy Mcnamara MD    diphenhydrAMINE injection 25 mg, 25 mg, Intravenous, Q4H PRN, Levy Mcnamara MD    doxycycline (VIBRAMYCIN) 100 mg in dextrose 5 % 250 mL IVPB, 100 mg, Intravenous, Q12H, Levy Mcnamara MD, Last Rate: 250 mL/hr at 17, 100 mg at 17    hydrocodone-acetaminophen 5-325mg per tablet 1 tablet, 1 tablet, Oral, Q4H PRN, Levy Mcnamara MD    ibuprofen tablet 600 mg, 600 mg, Oral, Q6H PRN, Levy Mcnamara MD    [START ON 2017] ketorolac injection 30 mg, 30 mg, Intravenous, Q6H, Levy Mcnamara MD    lactated ringers infusion, , Intravenous, Continuous, Levy Mcnamara MD, Last Rate: 125 mL/hr at 17    ondansetron disintegrating tablet 8 mg, 8 mg, Oral, Q8H PRN, Levy Mcnamara MD    oxycodone-acetaminophen  mg per tablet 1 tablet, 1 tablet, Oral, Q4H PRN, Levy Mcnamara MD     promethazine (PHENERGAN) 12.5 mg in dextrose 5 % 50 mL IVPB, 12.5 mg, Intravenous, Q6H PRN, Levy Mcnamara MD  Social History     Social History    Marital status: Single     Spouse name: N/A    Number of children: N/A    Years of education: N/A     Occupational History    Not on file.     Social History Main Topics    Smoking status: Former Smoker     Packs/day: 0.50     Years: 6.00     Types: Cigarettes    Smokeless tobacco: Former User     Quit date: 8/31/2014      Comment: Pt smokes 4 cigarettes per day.    Alcohol use No      Comment: sometimes    Drug use: No    Sexual activity: Yes     Partners: Male     Birth control/ protection: None     Other Topics Concern    Not on file     Social History Narrative    No narrative on file         Vitals:    06/12/17 1930   BP: (!) 100/56   Pulse: 88   Resp: 18   Temp: 99.9 °F (37.7 °C)     General Appearance: Alert, appropriate appearance for age. No acute distress, Chest/Respiratory Exam: normal, CTA  Cardiovascular Exam: RRR  Gastrointestinal Exam: soft, NT/ND  Pelvic Exam Female: Cervix: tender   Psychiatric Exam: Alert and oriented, appropriate affect.    Assessment: septic ab  Plan: suction d+c  I have discussed the risks, benefits, indications, and alternatives of the procedure in detail.  The patient verbalizes her understanding.  All questions answered.  Consents signed.  The patient agrees to proceed to proceed as planned.

## 2017-06-13 NOTE — PLAN OF CARE
Problem: Patient Care Overview  Goal: Plan of Care Review  Outcome: Outcome(s) achieved Date Met: 06/13/17  Patient left hospital against medical advice.

## 2017-06-13 NOTE — PROGRESS NOTES
Feeling much better  Denies bleeding  Denies ruq pain or nausea  Temp:  [96.9 °F (36.1 °C)-100.9 °F (38.3 °C)]   Pulse:  []   Resp:  [18-23]   BP: ()/(54-74)   SpO2:  [98 %-100 %]   abd soft non tender  No ruq tenderness  Ext no cce    Labs reviewed  Septic ab- much improved although still with temp 100.9  D/w pt need for iv abx and observation until afebrile  She has  issues but we discussed potential severity of dx  Elevated lfts with inr 1.3- unclear etiology  Will check hepatitis profile and repeat in am

## 2017-06-13 NOTE — DISCHARGE SUMMARY
26 y.o. admitted for suction d&c  for septic ab from office with temp 103 and retained products on u/s  She was observed overnight and max temp was 100.9  She also had some elevated lfts  On pod1 pt asked to leave ama  She was counselled by me and by RN that septic ab is serious and potentially life threatening and that oral abx may not be sufficient  Explained that she was not afebrile and rationale for staying and she left AMA    D/c to home  Admit date 6/12/2017  4:36 PM  D/c date 06/13/2017  D/c dx septic ab  Procedures suction d&c        D/c diet as tolerated  D/c meds per med rec  D/c f/u one week  D/c instructions pelvic rest and avoid heavy lifting

## 2017-06-13 NOTE — ANESTHESIA POSTPROCEDURE EVALUATION
"Anesthesia Post Evaluation    Patient: Carolyn Pearson    Procedure(s) Performed: Procedure(s) (LRB):  D & C (SUCTION) (N/A)    Final Anesthesia Type: general  Patient location during evaluation: PACU  Patient participation: Yes- Able to Participate  Level of consciousness: awake and alert and oriented  Post-procedure vital signs: reviewed and stable  Pain management: adequate  Airway patency: patent  PONV status at discharge: No PONV  Anesthetic complications: no      Cardiovascular status: blood pressure returned to baseline and hemodynamically stable  Respiratory status: unassisted, spontaneous ventilation and room air  Hydration status: euvolemic  Follow-up not needed.        Visit Vitals  /63   Pulse 88   Temp (!) 38.2 °C (100.8 °F) (Oral)   Resp 18   Ht 5' 4" (1.626 m)   Wt 68 kg (150 lb)   SpO2 99%   BMI 25.75 kg/m²       Pain/Edith Score: Pain Assessment Performed: Yes (6/12/2017  6:23 PM)  Presence of Pain: non-verbal indicators absent (6/12/2017  6:23 PM)  Edith Score: 10 (6/12/2017  6:56 PM)      "

## 2017-06-13 NOTE — PLAN OF CARE
Problem: Patient Care Overview  Goal: Plan of Care Review  Outcome: Ongoing (interventions implemented as appropriate)  Plan of care reviewed with pt.  All questions and concerns addressed.  Pt maintaining adequate pain control.  Has been afebrile.  Antibiotics started, and fluids infusing.  Small vag bleeding.

## 2017-06-13 NOTE — PROGRESS NOTES
Patient states that she must leave immediately due to childcare issues. Informed patient that she is not discharged and would be leaving against medical advice. Verbalized understanding and signed AMA paperwork. Instructed patient to return to emergency room if any heavy vaginal bleeding or fever present. Patient verbalizes understanding with good recall. Ambulated off of unit. No apparent distress.

## 2017-06-14 LAB
HAV IGM SERPL QL IA: NEGATIVE
HBV CORE IGM SERPL QL IA: NEGATIVE
HBV SURFACE AG SERPL QL IA: NEGATIVE
HCV AB SERPL QL IA: NEGATIVE

## 2017-11-17 ENCOUNTER — HOSPITAL ENCOUNTER (EMERGENCY)
Facility: HOSPITAL | Age: 27
Discharge: HOME | End: 2017-11-17
Attending: EMERGENCY MEDICINE
Payer: MEDICAID

## 2017-11-17 VITALS
SYSTOLIC BLOOD PRESSURE: 118 MMHG | OXYGEN SATURATION: 99 % | TEMPERATURE: 98 F | BODY MASS INDEX: 27.31 KG/M2 | WEIGHT: 160 LBS | HEIGHT: 64 IN | DIASTOLIC BLOOD PRESSURE: 63 MMHG | RESPIRATION RATE: 18 BRPM | HEART RATE: 66 BPM

## 2017-11-17 DIAGNOSIS — R11.10 VOMITING, INTRACTABILITY OF VOMITING NOT SPECIFIED, PRESENCE OF NAUSEA NOT SPECIFIED, UNSPECIFIED VOMITING TYPE: Primary | ICD-10-CM

## 2017-11-17 LAB
ALBUMIN SERPL BCP-MCNC: 3.8 G/DL
ALP SERPL-CCNC: 62 U/L
ALT SERPL W/O P-5'-P-CCNC: 16 U/L
ANION GAP SERPL CALC-SCNC: 9 MMOL/L
AST SERPL-CCNC: 18 U/L
B-HCG UR QL: NEGATIVE
BASOPHILS # BLD AUTO: 0.01 K/UL
BASOPHILS NFR BLD: 0.2 %
BILIRUB SERPL-MCNC: 0.6 MG/DL
BILIRUB UR QL STRIP: NEGATIVE
BUN SERPL-MCNC: 8 MG/DL
CALCIUM SERPL-MCNC: 9 MG/DL
CHLORIDE SERPL-SCNC: 104 MMOL/L
CLARITY UR: CLEAR
CO2 SERPL-SCNC: 27 MMOL/L
COLOR UR: YELLOW
CREAT SERPL-MCNC: 0.8 MG/DL
CTP QC/QA: YES
DIFFERENTIAL METHOD: ABNORMAL
EOSINOPHIL # BLD AUTO: 0.1 K/UL
EOSINOPHIL NFR BLD: 0.9 %
ERYTHROCYTE [DISTWIDTH] IN BLOOD BY AUTOMATED COUNT: 13.7 %
EST. GFR  (AFRICAN AMERICAN): >60 ML/MIN/1.73 M^2
EST. GFR  (NON AFRICAN AMERICAN): >60 ML/MIN/1.73 M^2
GLUCOSE SERPL-MCNC: 95 MG/DL
GLUCOSE UR QL STRIP: NEGATIVE
HCG INTACT+B SERPL-ACNC: <1.2 MIU/ML
HCT VFR BLD AUTO: 40.8 %
HGB BLD-MCNC: 13.4 G/DL
HGB UR QL STRIP: NEGATIVE
KETONES UR QL STRIP: NEGATIVE
LEUKOCYTE ESTERASE UR QL STRIP: NEGATIVE
LIPASE SERPL-CCNC: 9 U/L
LYMPHOCYTES # BLD AUTO: 0.8 K/UL
LYMPHOCYTES NFR BLD: 12.9 %
MCH RBC QN AUTO: 28.6 PG
MCHC RBC AUTO-ENTMCNC: 32.8 G/DL
MCV RBC AUTO: 87 FL
MONOCYTES # BLD AUTO: 0.6 K/UL
MONOCYTES NFR BLD: 9.7 %
NEUTROPHILS # BLD AUTO: 4.4 K/UL
NEUTROPHILS NFR BLD: 76.3 %
NITRITE UR QL STRIP: NEGATIVE
PH UR STRIP: 6 [PH] (ref 5–8)
PLATELET # BLD AUTO: 210 K/UL
PMV BLD AUTO: 12.5 FL
POTASSIUM SERPL-SCNC: 3.7 MMOL/L
PROT SERPL-MCNC: 7.1 G/DL
PROT UR QL STRIP: NEGATIVE
RBC # BLD AUTO: 4.68 M/UL
SODIUM SERPL-SCNC: 140 MMOL/L
SP GR UR STRIP: 1.03 (ref 1–1.03)
URN SPEC COLLECT METH UR: ABNORMAL
UROBILINOGEN UR STRIP-ACNC: ABNORMAL EU/DL
WBC # BLD AUTO: 5.8 K/UL

## 2017-11-17 PROCEDURE — 83690 ASSAY OF LIPASE: CPT

## 2017-11-17 PROCEDURE — 81025 URINE PREGNANCY TEST: CPT | Performed by: EMERGENCY MEDICINE

## 2017-11-17 PROCEDURE — 96374 THER/PROPH/DIAG INJ IV PUSH: CPT

## 2017-11-17 PROCEDURE — 63600175 PHARM REV CODE 636 W HCPCS: Performed by: EMERGENCY MEDICINE

## 2017-11-17 PROCEDURE — 81003 URINALYSIS AUTO W/O SCOPE: CPT

## 2017-11-17 PROCEDURE — 99283 EMERGENCY DEPT VISIT LOW MDM: CPT | Mod: 25

## 2017-11-17 PROCEDURE — 84702 CHORIONIC GONADOTROPIN TEST: CPT

## 2017-11-17 PROCEDURE — 80053 COMPREHEN METABOLIC PANEL: CPT

## 2017-11-17 PROCEDURE — 85025 COMPLETE CBC W/AUTO DIFF WBC: CPT

## 2017-11-17 RX ORDER — ONDANSETRON 2 MG/ML
4 INJECTION INTRAMUSCULAR; INTRAVENOUS
Status: COMPLETED | OUTPATIENT
Start: 2017-11-17 | End: 2017-11-17

## 2017-11-17 RX ORDER — ONDANSETRON 8 MG/1
8 TABLET, ORALLY DISINTEGRATING ORAL EVERY 8 HOURS PRN
Qty: 14 TABLET | Refills: 0 | Status: SHIPPED | OUTPATIENT
Start: 2017-11-17 | End: 2018-04-26

## 2017-11-17 RX ADMIN — ONDANSETRON 4 MG: 2 INJECTION INTRAMUSCULAR; INTRAVENOUS at 06:11

## 2017-11-17 NOTE — ED PROVIDER NOTES
Encounter Date: 11/17/2017    SCRIBE #1 NOTE: I, Irasema Moseley, am scribing for, and in the presence of,  Brent Colin MD. I have scribed the following portions of the note - Other sections scribed: HPI and ROS.       History     Chief Complaint   Patient presents with    Abdominal Pain     LOWER ABDOMINAL PAIN TODAY +VOMITING      CC: Abdominal Pain, Nausea, and Vomiting    HPI: This 26 y.o. Female (LMP:11/01/17) presents to the ED c/o acute onset and moderate lower abdominal pain, nausea, and vomiting that began last night. Pt attributes her symptoms to a possible pregnancy. Pt adds she vomits anything red during a pregnancy, and she vomited a red drink last night. Pt notes she had a miscarriage followed by an infection and a D & C approximately 5 months ago. Pt denies heart burn and reflux. Pt also denies fever, cough, SOB, chest pain, diarrhea, dysuria, and any other associated symptoms.       The history is provided by the patient. No  was used.     Review of patient's allergies indicates:  No Known Allergies  History reviewed. No pertinent past medical history.  Past Surgical History:   Procedure Laterality Date    FRACTURE SURGERY      left leg    LEG SURGERY       Family History   Problem Relation Age of Onset    Hypertension Mother     Hypertension Maternal Grandmother     Kidney disease Maternal Grandmother     Diabetes Maternal Grandmother     Diabetes Paternal Grandmother     Hypertension Paternal Grandmother     Kidney disease Paternal Grandmother     Breast cancer Neg Hx     Colon cancer Neg Hx     Ovarian cancer Neg Hx      Social History   Substance Use Topics    Smoking status: Former Smoker     Packs/day: 0.50     Years: 6.00     Types: Cigarettes    Smokeless tobacco: Former User     Quit date: 8/31/2014      Comment: Pt smokes 4 cigarettes per day.    Alcohol use No      Comment: sometimes     Review of Systems   Constitutional: Negative for chills,  diaphoresis and fever.   HENT: Negative for ear pain and sore throat.    Eyes: Negative for pain.   Respiratory: Negative for cough and shortness of breath.    Cardiovascular: Negative for chest pain.   Gastrointestinal: Positive for abdominal pain ( moderate, lower), nausea and vomiting. Negative for diarrhea.   Genitourinary: Negative for dysuria.   Musculoskeletal: Negative for back pain.   Skin: Negative for rash.   Neurological: Negative for headaches.       Physical Exam     Initial Vitals [11/17/17 0434]   BP Pulse Resp Temp SpO2   (!) 105/57 75 18 97.6 °F (36.4 °C) 100 %      MAP       73         Physical Exam    Nursing note and vitals reviewed.  Constitutional: She appears well-developed and well-nourished.   Eyes: EOM are normal. Pupils are equal, round, and reactive to light.   Neck: Normal range of motion. Neck supple. No thyromegaly present. No JVD present.   Cardiovascular: Normal rate, regular rhythm, normal heart sounds and intact distal pulses. Exam reveals no gallop and no friction rub.    No murmur heard.  Pulmonary/Chest: Breath sounds normal. No respiratory distress.   Abdominal: Soft. Bowel sounds are normal. She exhibits no distension. There is no tenderness. There is no rebound and no guarding.   Musculoskeletal: Normal range of motion. She exhibits no edema or tenderness.   Neurological: She is alert and oriented to person, place, and time. She has normal strength.   Skin: Skin is warm and dry. Capillary refill takes less than 2 seconds. No rash noted.         ED Course   Procedures  Labs Reviewed   URINALYSIS - Abnormal; Notable for the following:        Result Value    Urobilinogen, UA 2.0-3.0 (*)     All other components within normal limits   CBC W/ AUTO DIFFERENTIAL - Abnormal; Notable for the following:     Lymph # 0.8 (*)     Gran% 76.3 (*)     Lymph% 12.9 (*)     All other components within normal limits   COMPREHENSIVE METABOLIC PANEL   LIPASE   HCG, QUANTITATIVE, PREGNANCY   POCT  URINE PREGNANCY        Patient states she had nausea and vomiting overnight. No fever. No abdominal pain. No diarrhea or constipation. Non bloody no bilious emesis. States she has only thrown up in the past when she is pregnant. Here to determine if she is pregnant. BHCG is negative. Abdominal exam is benign. VSS> Will treat with Zofran. Stable for discharge. Return for new or worsening symptoms.                 Scribe Attestation:   Scribe #1: I performed the above scribed service and the documentation accurately describes the services I performed. I attest to the accuracy of the note.    Attending Attestation:           Physician Attestation for Scribe:  Physician Attestation Statement for Scribe #1: I, Brent Colin MD, reviewed documentation, as scribed by Irasema Moseley in my presence, and it is both accurate and complete.                 ED Course      Clinical Impression:   The encounter diagnosis was Vomiting, intractability of vomiting not specified, presence of nausea not specified, unspecified vomiting type.                           Brent Colin MD  12/18/17 2660

## 2017-11-17 NOTE — ED TRIAGE NOTES
"  26 year old female arrives to the ED via personal transportation due to lower middle abdominal pain that started tonight. Pt reports pain come and goes and feels like cramping. "it felt like this when I was pregnant before." pt denies missing menstrual period. Pain currently 7/10. +vomiting x 4 tonight.Denies CP, SOB, and fever. Reports history of std trichomoniasis but states "it was more on my right side but that's not what it feels like right now."    "

## 2017-11-17 NOTE — ED NOTES
Pt up to urinate; pt holding small child while walking to bathroom. Pt appears to be in no distress. Will continue to monitor.

## 2017-11-17 NOTE — ED NOTES
Pt sitting in bed in nad. Pt instructed about plan of care. Pt states nausea better and pain down to 4/10. Call light within reach. Will continue to monitor.

## 2018-04-26 ENCOUNTER — LAB VISIT (OUTPATIENT)
Dept: LAB | Facility: HOSPITAL | Age: 28
End: 2018-04-26
Attending: OBSTETRICS & GYNECOLOGY
Payer: MEDICAID

## 2018-04-26 ENCOUNTER — OFFICE VISIT (OUTPATIENT)
Dept: OBSTETRICS AND GYNECOLOGY | Facility: CLINIC | Age: 28
End: 2018-04-26
Payer: MEDICAID

## 2018-04-26 VITALS
DIASTOLIC BLOOD PRESSURE: 67 MMHG | BODY MASS INDEX: 26.29 KG/M2 | WEIGHT: 154 LBS | SYSTOLIC BLOOD PRESSURE: 115 MMHG | HEIGHT: 64 IN

## 2018-04-26 DIAGNOSIS — Z01.419 ENCOUNTER FOR WELL WOMAN EXAM WITH ROUTINE GYNECOLOGICAL EXAM: Primary | ICD-10-CM

## 2018-04-26 DIAGNOSIS — Z11.3 SCREENING EXAMINATION FOR STD (SEXUALLY TRANSMITTED DISEASE): ICD-10-CM

## 2018-04-26 DIAGNOSIS — Z30.09 ENCOUNTER FOR OTHER GENERAL COUNSELING AND ADVICE ON CONTRACEPTION: ICD-10-CM

## 2018-04-26 DIAGNOSIS — B96.89 BV (BACTERIAL VAGINOSIS): ICD-10-CM

## 2018-04-26 DIAGNOSIS — N76.0 BV (BACTERIAL VAGINOSIS): ICD-10-CM

## 2018-04-26 PROCEDURE — 87510 GARDNER VAG DNA DIR PROBE: CPT

## 2018-04-26 PROCEDURE — 99213 OFFICE O/P EST LOW 20 MIN: CPT | Mod: PBBFAC | Performed by: OBSTETRICS & GYNECOLOGY

## 2018-04-26 PROCEDURE — 99385 PREV VISIT NEW AGE 18-39: CPT | Mod: S$PBB,,, | Performed by: OBSTETRICS & GYNECOLOGY

## 2018-04-26 PROCEDURE — 87491 CHLMYD TRACH DNA AMP PROBE: CPT

## 2018-04-26 PROCEDURE — 80074 ACUTE HEPATITIS PANEL: CPT

## 2018-04-26 PROCEDURE — 87480 CANDIDA DNA DIR PROBE: CPT

## 2018-04-26 PROCEDURE — 99999 PR PBB SHADOW E&M-EST. PATIENT-LVL III: CPT | Mod: PBBFAC,,, | Performed by: OBSTETRICS & GYNECOLOGY

## 2018-04-26 PROCEDURE — 86703 HIV-1/HIV-2 1 RESULT ANTBDY: CPT

## 2018-04-26 PROCEDURE — 86592 SYPHILIS TEST NON-TREP QUAL: CPT

## 2018-04-26 PROCEDURE — 36415 COLL VENOUS BLD VENIPUNCTURE: CPT

## 2018-04-26 RX ORDER — METRONIDAZOLE 500 MG/1
500 TABLET ORAL EVERY 12 HOURS
Qty: 14 TABLET | Refills: 0 | Status: SHIPPED | OUTPATIENT
Start: 2018-04-26 | End: 2018-05-03

## 2018-04-26 NOTE — PROGRESS NOTES
SUBJECTIVE:   Carolyn Pearson is a 27 y.o. female   for annual well woman exam. Patient's last menstrual period was 2018 (exact date)..    Contraception: none. Would like to start on contraception    History reviewed. No pertinent past medical history.  Past Surgical History:   Procedure Laterality Date    FRACTURE SURGERY      left leg    LEG SURGERY       Social History     Social History    Marital status: Single     Spouse name: N/A    Number of children: N/A    Years of education: N/A     Occupational History    Not on file.     Social History Main Topics    Smoking status: Former Smoker     Packs/day: 0.50     Years: 6.00     Types: Cigarettes    Smokeless tobacco: Former User     Quit date: 2014      Comment: Pt smokes 4 cigarettes per day.    Alcohol use No      Comment: sometimes    Drug use: No    Sexual activity: Yes     Partners: Male     Birth control/ protection: None     Other Topics Concern    Not on file     Social History Narrative    No narrative on file     Family History   Problem Relation Age of Onset    Hypertension Mother     Hypertension Maternal Grandmother     Kidney disease Maternal Grandmother     Diabetes Maternal Grandmother     Diabetes Paternal Grandmother     Hypertension Paternal Grandmother     Kidney disease Paternal Grandmother     Breast cancer Neg Hx     Colon cancer Neg Hx     Ovarian cancer Neg Hx      OB History    Para Term  AB Living   4 1 1   3 1   SAB TAB Ectopic Multiple Live Births   1 2   0 1      # Outcome Date GA Lbr Ok/2nd Weight Sex Delivery Anes PTL Lv   4 Term 14 39w2d  3.285 kg (7 lb 3.9 oz) F CS-LTranv EPI N DAMEON   3 TAB            2 TAB            1 SAB               Obstetric Comments   Gynhx: reg/5. Mod flow, mild cramp   Denies abnl pap   H/o trichomonas         No current outpatient prescriptions on file.     No current facility-administered medications for this visit.      Allergies: Patient has  "no known allergies.       ROS:  GENERAL: Denies weight gain or weight loss. Feeling well overall.   SKIN: Denies rash or lesions.   HEAD: Denies head injury or headache.   NODES: Denies enlarged lymph nodes.   CHEST: Denies chest pain or shortness of breath.   CARDIOVASCULAR: Denies palpitations or left sided chest pain.   ABDOMEN: No abdominal pain, constipation, diarrhea, nausea, vomiting or rectal bleeding.   URINARY: No frequency, dysuria, hematuria, or burning on urination.  REPRODUCTIVE: + vaginal discharge, denies abnormal vaginal bleeding, lesions, pelvic pain  BREASTS: The patient performs breast self-examination and denies pain, lumps, or nipple discharge.   HEMATOLOGIC: No easy bruisability or excessive bleeding.  MUSCULOSKELETAL: Denies joint pain or swelling.   NEUROLOGIC: Denies syncope or weakness.   PSYCHIATRIC: Denies depression, anxiety or mood swings.      OBJECTIVE:   /67   Ht 5' 4" (1.626 m)   Wt 69.9 kg (154 lb)   LMP 04/14/2018 (Exact Date)   BMI 26.43 kg/m²   The patient appears well, alert, oriented x 3, in no distress.  PSYCH:  Normal, full range of affect  NECK: negative, no thyromegaly, trachea midline  SKIN: normal, good color, good turgor and no acne, striae, hirsutism  BREAST EXAM: breasts appear normal, no suspicious masses, no skin or nipple changes or axillary nodes  ABDOMEN: soft, non-tender; bowel sounds normal; no masses,  no organomegaly and no hernias, masses, or hepatosplenomegaly  GENITALIA: normal external genitalia, no erythema, no discharge  URETHRA: normal appearing urethra with no masses, tenderness or lesions and normal urethra, normal urethral meatus  VAGINA: frothy discharge and malodor  CERVIX: no lesions or cervical motion tenderness  UTERUS: normal size, contour, position, consistency, mobility, non-tender  ADNEXA: no mass, fullness, tenderness      ASSESSMENT:   .There are no diagnoses linked to this encounter.    1. Health maintenance  -pap 6/2017 NEG, " next pap 2020  -counseled on exercise and healthy diet, weight loss  -bone health:  Discussed Vitamin D and Calcium supplementation, weight bearing exercises  -std panel  -contraception:  Discussed options and the r/b/a of OCP, patches, nuvaring, depo injection, implants, hormonal and non-hormonal IUDs.    We discussed the advantages and disadvantages of each. We discussed the continued use of condoms to prevent STDs.  Pt elects paragard. Order placed today  2.  Discussed safety at home/school/work, healthy and balanced diet, sleep hygiene, as well as violence/weapons exposure.   3. rtc for iud insertion      CC:  Vaginal discharge    HPI:  Vaginal discharge x 3 days, light odor. Thick discharge, white.  Denies itching or burning.  Never had this problem.  Has not tried any OTC med    PE:  ABDOMEN: soft, non-tender; bowel sounds normal; no masses,  no organomegaly and no hernias, masses, or hepatosplenomegaly  GENITALIA: normal external genitalia, no erythema, no discharge  URETHRA: normal appearing urethra with no masses, tenderness or lesions and normal urethra, normal urethral meatus  VAGINA: frothy discharge and malodor  CERVIX: no lesions or cervical motion tenderness  UTERUS: normal size, contour, position, consistency, mobility, non-tender  ADNEXA: no mass, fullness, tenderness    A/P  1.  Likely BV:  Rx for flagyl

## 2018-04-27 LAB
C TRACH DNA SPEC QL NAA+PROBE: NOT DETECTED
CANDIDA RRNA VAG QL PROBE: NEGATIVE
G VAGINALIS RRNA GENITAL QL PROBE: POSITIVE
HAV IGM SERPL QL IA: NEGATIVE
HBV CORE IGM SERPL QL IA: NEGATIVE
HBV SURFACE AG SERPL QL IA: NEGATIVE
HCV AB SERPL QL IA: NEGATIVE
HIV 1+2 AB+HIV1 P24 AG SERPL QL IA: NEGATIVE
N GONORRHOEA DNA SPEC QL NAA+PROBE: NOT DETECTED
RPR SER QL: NORMAL
T VAGINALIS RRNA GENITAL QL PROBE: NEGATIVE

## 2018-04-30 ENCOUNTER — TELEPHONE (OUTPATIENT)
Dept: OBSTETRICS AND GYNECOLOGY | Facility: CLINIC | Age: 28
End: 2018-04-30

## 2018-04-30 NOTE — TELEPHONE ENCOUNTER
----- Message from Yogesh Crum MD sent at 4/27/2018  4:42 PM CDT -----  Please inform pt of Negative STD testing

## 2018-04-30 NOTE — TELEPHONE ENCOUNTER
Notes recorded by Katelyn Ceballos MA on 4/30/2018 at 11:30 AM CDT  Spoke with patient in regards to normal results. Patient expressed understanding.

## 2018-05-08 ENCOUNTER — TELEPHONE (OUTPATIENT)
Dept: OBSTETRICS AND GYNECOLOGY | Facility: CLINIC | Age: 28
End: 2018-05-08

## 2018-05-08 NOTE — TELEPHONE ENCOUNTER
----- Message from Susana Recinos sent at 5/8/2018 10:15 AM CDT -----  Contact: self  Patient called stating that she has taken a pregnancy test and it is positive. Want to know if urine test was done at [facility] on 4/26 to detect pregnancy. Please contact her at [phone].    Thanks!  -------------------------------------------------------------------------------  5/8/18 @ 7478 (DEMETRICE)  SPOKE WITH MS KIRAN, INFORMED HER THAT SHE DOES NOT HAVE A URINE PREGNANCY TEST ORDERED. SHE STATED SHE WAS SUPPOSED TO GET THE PARAGARD IUD BUT ,  SHE STATED SHE TOOK 2 HOME PREGNANCY TEST AND THEY BOTH SHOWED POSITIVE, SHE STATED SHE IS [facility],

## 2018-05-16 ENCOUNTER — HOSPITAL ENCOUNTER (EMERGENCY)
Facility: HOSPITAL | Age: 28
Discharge: HOME | End: 2018-05-16
Attending: EMERGENCY MEDICINE
Payer: MEDICAID

## 2018-05-16 VITALS
BODY MASS INDEX: 24.92 KG/M2 | TEMPERATURE: 98 F | DIASTOLIC BLOOD PRESSURE: 72 MMHG | SYSTOLIC BLOOD PRESSURE: 107 MMHG | OXYGEN SATURATION: 98 % | RESPIRATION RATE: 16 BRPM | WEIGHT: 146 LBS | HEART RATE: 71 BPM | HEIGHT: 64 IN

## 2018-05-16 DIAGNOSIS — N93.9 VAGINAL BLEEDING: Primary | ICD-10-CM

## 2018-05-16 DIAGNOSIS — O20.9 BLEEDING IN EARLY PREGNANCY: Primary | ICD-10-CM

## 2018-05-16 DIAGNOSIS — O36.80X0 PREGNANCY OF UNKNOWN ANATOMIC LOCATION: ICD-10-CM

## 2018-05-16 LAB
ABO + RH BLD: NORMAL
ALBUMIN SERPL BCP-MCNC: 3.7 G/DL
ALP SERPL-CCNC: 53 U/L
ALT SERPL W/O P-5'-P-CCNC: 14 U/L
AMORPH CRY URNS QL MICRO: NORMAL
ANION GAP SERPL CALC-SCNC: 7 MMOL/L
AST SERPL-CCNC: 14 U/L
B-HCG UR QL: POSITIVE
BACTERIA #/AREA URNS HPF: NORMAL /HPF
BACTERIA GENITAL QL WET PREP: ABNORMAL
BASOPHILS # BLD AUTO: 0.04 K/UL
BASOPHILS NFR BLD: 1.1 %
BILIRUB SERPL-MCNC: 0.5 MG/DL
BILIRUB UR QL STRIP: NEGATIVE
BUN SERPL-MCNC: 6 MG/DL
CALCIUM SERPL-MCNC: 9.1 MG/DL
CHLORIDE SERPL-SCNC: 105 MMOL/L
CLARITY UR: ABNORMAL
CLUE CELLS VAG QL WET PREP: ABNORMAL
CO2 SERPL-SCNC: 26 MMOL/L
COLOR UR: YELLOW
CREAT SERPL-MCNC: 0.8 MG/DL
CTP QC/QA: YES
DIFFERENTIAL METHOD: ABNORMAL
EOSINOPHIL # BLD AUTO: 0.1 K/UL
EOSINOPHIL NFR BLD: 2.1 %
ERYTHROCYTE [DISTWIDTH] IN BLOOD BY AUTOMATED COUNT: 13.4 %
EST. GFR  (AFRICAN AMERICAN): >60 ML/MIN/1.73 M^2
EST. GFR  (NON AFRICAN AMERICAN): >60 ML/MIN/1.73 M^2
FILAMENT FUNGI VAG WET PREP-#/AREA: ABNORMAL
GLUCOSE SERPL-MCNC: 99 MG/DL
GLUCOSE UR QL STRIP: NEGATIVE
HCG INTACT+B SERPL-ACNC: 4681 MIU/ML
HCT VFR BLD AUTO: 38 %
HGB BLD-MCNC: 12.8 G/DL
HGB UR QL STRIP: ABNORMAL
KETONES UR QL STRIP: NEGATIVE
LEUKOCYTE ESTERASE UR QL STRIP: NEGATIVE
LYMPHOCYTES # BLD AUTO: 1.4 K/UL
LYMPHOCYTES NFR BLD: 37.9 %
MCH RBC QN AUTO: 29 PG
MCHC RBC AUTO-ENTMCNC: 33.7 G/DL
MCV RBC AUTO: 86 FL
MICROSCOPIC COMMENT: NORMAL
MONOCYTES # BLD AUTO: 0.4 K/UL
MONOCYTES NFR BLD: 9.9 %
NEUTROPHILS # BLD AUTO: 1.8 K/UL
NEUTROPHILS NFR BLD: 49 %
NITRITE UR QL STRIP: NEGATIVE
PH UR STRIP: 7 [PH] (ref 5–8)
PLATELET # BLD AUTO: 225 K/UL
PMV BLD AUTO: 12 FL
POTASSIUM SERPL-SCNC: 3.6 MMOL/L
PROT SERPL-MCNC: 7 G/DL
PROT UR QL STRIP: NEGATIVE
RBC # BLD AUTO: 4.42 M/UL
RBC #/AREA URNS HPF: 2 /HPF (ref 0–4)
SODIUM SERPL-SCNC: 138 MMOL/L
SP GR UR STRIP: 1.02 (ref 1–1.03)
SPECIMEN SOURCE: ABNORMAL
SQUAMOUS #/AREA URNS HPF: 5 /HPF
T VAGINALIS GENITAL QL WET PREP: ABNORMAL
URN SPEC COLLECT METH UR: ABNORMAL
UROBILINOGEN UR STRIP-ACNC: NEGATIVE EU/DL
WBC # BLD AUTO: 3.75 K/UL
WBC #/AREA VAG WET PREP: ABNORMAL
YEAST GENITAL QL WET PREP: ABNORMAL

## 2018-05-16 PROCEDURE — 81000 URINALYSIS NONAUTO W/SCOPE: CPT

## 2018-05-16 PROCEDURE — 87210 SMEAR WET MOUNT SALINE/INK: CPT

## 2018-05-16 PROCEDURE — 80053 COMPREHEN METABOLIC PANEL: CPT

## 2018-05-16 PROCEDURE — 87491 CHLMYD TRACH DNA AMP PROBE: CPT

## 2018-05-16 PROCEDURE — 81025 URINE PREGNANCY TEST: CPT | Performed by: NURSE PRACTITIONER

## 2018-05-16 PROCEDURE — 86901 BLOOD TYPING SEROLOGIC RH(D): CPT

## 2018-05-16 PROCEDURE — 25000003 PHARM REV CODE 250: Performed by: PHYSICIAN ASSISTANT

## 2018-05-16 PROCEDURE — 84702 CHORIONIC GONADOTROPIN TEST: CPT

## 2018-05-16 PROCEDURE — 99284 EMERGENCY DEPT VISIT MOD MDM: CPT | Mod: 25

## 2018-05-16 PROCEDURE — 85025 COMPLETE CBC W/AUTO DIFF WBC: CPT

## 2018-05-16 RX ORDER — ACETAMINOPHEN 500 MG
500 TABLET ORAL
Status: COMPLETED | OUTPATIENT
Start: 2018-05-16 | End: 2018-05-16

## 2018-05-16 RX ORDER — OXYCODONE AND ACETAMINOPHEN 5; 325 MG/1; MG/1
1 TABLET ORAL
Status: COMPLETED | OUTPATIENT
Start: 2018-05-16 | End: 2018-05-16

## 2018-05-16 RX ORDER — OXYCODONE AND ACETAMINOPHEN 5; 325 MG/1; MG/1
1 TABLET ORAL EVERY 4 HOURS PRN
Qty: 15 TABLET | Refills: 0 | Status: SHIPPED | OUTPATIENT
Start: 2018-05-16 | End: 2018-08-07

## 2018-05-16 RX ADMIN — OXYCODONE HYDROCHLORIDE AND ACETAMINOPHEN 1 TABLET: 5; 325 TABLET ORAL at 01:05

## 2018-05-16 RX ADMIN — ACETAMINOPHEN 500 MG: 500 TABLET, FILM COATED ORAL at 11:05

## 2018-05-16 NOTE — ED TRIAGE NOTES
Pt is currently 5-6 weeks pregnant. Pt c/o vaginal bleeding and cramping starting yesterday. Pt states she has Hx of 2 miscarriages, 1 , and states her pregnancies are high risk.

## 2018-05-16 NOTE — DISCHARGE INSTRUCTIONS
Your ultrasound today shows possible early pregnancy of unknown location.    It is important that you rest and practice pelvic rest for the next several days (no sexual intercourse, no strenuous physical activity). Please drink plenty of fluids.    You should follow-up with your OBGYN (Dr. Crum) on Friday for repeat lab work and ultrasound. You should have labs drawn that morning (go to the lab by 9:00 AM before your appointment on Friday). You should report to Dr. Crum's office at 1:00 PM for your appointment.    You have been prescribed Percocet for pain. Please do not take this medication while working, drinking alcohol, swimming, or while driving/operating heavy machinery. This medication may cause drowsiness, impair judgment, and reduce physical capabilities. This medication contains Tylenol. Please do not take any additional Tylenol while you are taking this medication.     Return to the ER immediately for any worsening bleeding, worsening pain not controlled with your pain medications or other concerning symptoms.

## 2018-05-16 NOTE — PROGRESS NOTES
Called from ED for pt with bleeding and cramping  Quant was >4000  US shows small gestation sac 7 mm intrauterine  No adnexal masses  Mild free fluid.  No acute abdomen noted on abdominal exam by NP in ED  Pt was given very strict ER precautions.   Pt to f/u with me in clinic in 2 days. She will have her quant repeat the morning before her appt.

## 2018-05-16 NOTE — ED PROVIDER NOTES
Encounter Date: 2018    This is a SORT/MSE of a 27 y.o. female presenting to the ED with c/o vaginal bleeding, reports she is appx 5-6 weeks pregnant. Care will be transferred to an alternate provider when patient is roomed for a full evaluation and final disposition. DIONNA Arreola, FNP-C       History     Chief Complaint   Patient presents with    Vaginal Bleeding     Was told she was 5-6 weeks pregnant. Started having cramping and bleeding yesterday     CC: Vaginal Bleeding    HPI: This is a A3 gravid 26 yo F with PMHx  with septicemia who presents with vaginal bleeding. She reports vaginal bleeding and lower abdominal cramping that began yesterday. She denies any trauma or injury. She reports taking Percocet last night with no relief of pain. She reports bleeding through 4 pads since yesterday. She reports recently seeing her OBGYN () and being treated for bacterial vaginosis with a course of Macrobid. She denies fever, chills, diaphoresis, SOB, chest pain, dysuria, vaginal discharge or further symptoms.             Review of patient's allergies indicates:  No Known Allergies  Past Medical History:   Diagnosis Date    Miscarriage      Past Surgical History:   Procedure Laterality Date    FRACTURE SURGERY      left leg    LEG SURGERY       Family History   Problem Relation Age of Onset    Hypertension Mother     Hypertension Maternal Grandmother     Kidney disease Maternal Grandmother     Diabetes Maternal Grandmother     Diabetes Paternal Grandmother     Hypertension Paternal Grandmother     Kidney disease Paternal Grandmother     Breast cancer Neg Hx     Colon cancer Neg Hx     Ovarian cancer Neg Hx      Social History   Substance Use Topics    Smoking status: Former Smoker     Packs/day: 0.50     Years: 6.00     Types: Cigarettes    Smokeless tobacco: Former User     Quit date: 2014      Comment: Pt smokes 4 cigarettes per day.    Alcohol use No      Comment:  sometimes     Review of Systems   Constitutional: Negative for chills, diaphoresis, fatigue and fever.   HENT: Negative for congestion, ear pain and sore throat.    Eyes: Negative for pain.   Respiratory: Negative for shortness of breath.    Cardiovascular: Negative for chest pain.   Gastrointestinal: Positive for abdominal pain (lower, cramping). Negative for constipation, diarrhea, nausea and vomiting.   Genitourinary: Positive for vaginal bleeding. Negative for decreased urine volume, dysuria, flank pain, pelvic pain, vaginal discharge and vaginal pain.   Musculoskeletal: Negative for back pain and neck pain.   Skin: Negative for rash.   Neurological: Negative for weakness and headaches.   Hematological: Does not bruise/bleed easily.   Psychiatric/Behavioral: Negative for confusion.       Physical Exam     Initial Vitals [05/16/18 1013]   BP Pulse Resp Temp SpO2   123/67 84 18 98.3 °F (36.8 °C) 100 %      MAP       85.67         Physical Exam    Nursing note and vitals reviewed.  Constitutional: Vital signs are normal. She appears well-developed and well-nourished. She is not diaphoretic. She is cooperative.  Non-toxic appearance. She does not have a sickly appearance. She does not appear ill. No distress.   HENT:   Head: Normocephalic and atraumatic.   Right Ear: Tympanic membrane, external ear and ear canal normal.   Left Ear: Tympanic membrane, external ear and ear canal normal.   Nose: Nose normal.   Mouth/Throat: Uvula is midline, oropharynx is clear and moist and mucous membranes are normal. No trismus in the jaw. No uvula swelling. No oropharyngeal exudate.   Eyes: Conjunctivae, EOM and lids are normal. Pupils are equal, round, and reactive to light.   Neck: Trachea normal, normal range of motion, full passive range of motion without pain and phonation normal. Neck supple.   Cardiovascular: Normal rate, regular rhythm, normal heart sounds and intact distal pulses. Exam reveals no gallop and no friction  rub.    No murmur heard.  Pulmonary/Chest: Effort normal and breath sounds normal. No respiratory distress. She has no decreased breath sounds. She has no wheezes. She has no rhonchi. She has no rales.   Abdominal: Soft. Normal appearance and bowel sounds are normal. She exhibits no distension and no mass. There is no tenderness. There is no rigidity, no rebound, no guarding and no CVA tenderness.   Genitourinary: Pelvic exam was performed with patient supine. There is no rash, tenderness, lesion or injury on the right labia. There is no rash, tenderness, lesion or injury on the left labia. Cervix exhibits no motion tenderness, no discharge and no friability. Right adnexum displays no tenderness. Left adnexum displays no tenderness. There is bleeding (moderate) in the vagina. No erythema or tenderness in the vagina. No foreign body in the vagina. No signs of injury around the vagina. No vaginal discharge found.   Genitourinary Comments: Cervical os is open 1 fingertip.    Musculoskeletal: Normal range of motion.   Neurological: She is alert and oriented to person, place, and time. She has normal strength. No cranial nerve deficit or sensory deficit. GCS eye subscore is 4. GCS verbal subscore is 5. GCS motor subscore is 6.   Skin: Skin is warm and dry. Capillary refill takes less than 2 seconds. No rash noted.   Psychiatric: She has a normal mood and affect. Her speech is normal and behavior is normal. Judgment and thought content normal. Cognition and memory are normal.         ED Course   Procedures  Labs Reviewed   URINALYSIS, REFLEX TO URINE CULTURE - Abnormal; Notable for the following:        Result Value    Appearance, UA Hazy (*)     Occult Blood UA 2+ (*)     All other components within normal limits    Narrative:     Preferred Collection Type->Urine, Clean Catch   CBC W/ AUTO DIFFERENTIAL - Abnormal; Notable for the following:     WBC 3.75 (*)     All other components within normal limits   COMPREHENSIVE  METABOLIC PANEL - Abnormal; Notable for the following:     Alkaline Phosphatase 53 (*)     Anion Gap 7 (*)     All other components within normal limits   VAGINAL SCREEN - Abnormal; Notable for the following:     Trichomonas Rare (*)     Bacteria - Vaginal Screen Rare (*)     All other components within normal limits   POCT URINE PREGNANCY - Abnormal; Notable for the following:     POC Preg Test, Ur Positive (*)     All other components within normal limits   C. TRACHOMATIS/N. GONORRHOEAE BY AMP DNA   HCG, QUANTITATIVE, PREGNANCY   URINALYSIS MICROSCOPIC    Narrative:     Preferred Collection Type->Urine, Clean Catch   GROUP & RH                   APC / Resident Notes:   This is an evaluation of a A3 gravid 27 y.o. female with PMHx  with septicemia that presents to the Emergency Department for vaginal bleeding. She reports vaginal bleeding and lower abdominal cramping that began yesterday. She denies any trauma or injury. She reports taking Percocet last night with no relief of pain. She reports bleeding through 4 pads since yesterday. She reports recently seeing her OBGYN (Dr. Crum) and being treated for bacterial vaginosis with a course of Macrobid. She denies fever, chills, diaphoresis, SOB, chest pain, dysuria, vaginal discharge or further symptoms.       Physical Exam shows a non-toxic, afebrile, and well appearing female. The vulva appear normal without injury, edema, rash or lesions. There is moderate bleeding in the vaginal vault. No foreign body visalized. Cervical os is open, 1 fingertip. No CMT, cervical friability or discharge.     Vital Signs Are Reassuring. If available, previous records reviewed.     RESULTS: UPT positive.  UA negative for UTI.  CBC negative for severe anemia H&H 12.8 and 38  CMP unrevealing for electrolyte abnormality, transaminitis or IVAN.  Quantitative hcg 4681  ABO/Rh O positive  Transvaginal ultrasound shows small fluid collection within endometrial cavity which does  not contain a yolk sac or fetal pole at this time which may represent early IUP, although pseudo gestational sac of ectopic pregnancy not excluded.    My overall impression is vaginal bleeding and pregnancy of unknown anatomic location  Ddx: pregnancy, pregnancy of unknown location, ectopic pregnancy, miscarriage, vaginal bleeding, anemia    ED Course: Tylenol given for pain. Upon re-evaluation, patient reports continued severe 8/10 pain.  Patient was given Percocet.  I consulted Dr. Crum (OBGYN) regarding this patient's case. She requested pain control and returning call. After 1 hour of monitoring, patient reports relief of pain. I spoke with Dr. Crum regarding pain control and abdominal exam is benign. She recommended close follow-up in her clinic in 2 days for repeat ultrasound and repeat hcg quantitative (Appt on Friday at 1:00 PM and lab draw in the morning prior to the appointment).  I feel this patient is stable for discharge with strict ED return precautions. I will recommend rest, pelvic rest and increased fluid intake. D/C Meds: Percocet (drowsiness precautions given). The diagnosis, treatment plan, instructions for follow-up and reevaluation with OBGYN, as well as ED return precautions were discussed and understanding was verbalized. All questions or concerns have been addressed. Patient was discharged home with an instructional sheet which gave not only information regarding the most likely diagnoses but also information regarding when to return to the emergency department for alarming symptoms and when to seek further care.      This case was discussed with Dr. Sung who is in agreement with my assessment and plan.     Zabrina Anglin PA-C             Attending Attestation:     Physician Attestation Statement for NP/PA:   I discussed this assessment and plan of this patient with the NP/PA, but I did not personally examine the patient. The face to face encounter was performed by the NP/PA.                      Clinical Impression:   The primary encounter diagnosis was Vaginal bleeding. A diagnosis of Pregnancy of unknown anatomic location was also pertinent to this visit.    Disposition:   Disposition: Discharged  Condition: Stable                        Zabrina Monk PA-C  05/16/18 5305

## 2018-05-17 LAB
C TRACH DNA SPEC QL NAA+PROBE: NOT DETECTED
N GONORRHOEA DNA SPEC QL NAA+PROBE: NOT DETECTED

## 2018-05-18 ENCOUNTER — OFFICE VISIT (OUTPATIENT)
Dept: OBSTETRICS AND GYNECOLOGY | Facility: CLINIC | Age: 28
End: 2018-05-18
Payer: MEDICAID

## 2018-05-18 ENCOUNTER — TELEPHONE (OUTPATIENT)
Dept: OBSTETRICS AND GYNECOLOGY | Facility: CLINIC | Age: 28
End: 2018-05-18

## 2018-05-18 ENCOUNTER — LAB VISIT (OUTPATIENT)
Dept: LAB | Facility: HOSPITAL | Age: 28
End: 2018-05-18
Attending: OBSTETRICS & GYNECOLOGY
Payer: MEDICAID

## 2018-05-18 VITALS
DIASTOLIC BLOOD PRESSURE: 72 MMHG | WEIGHT: 147 LBS | HEIGHT: 64 IN | BODY MASS INDEX: 25.1 KG/M2 | SYSTOLIC BLOOD PRESSURE: 130 MMHG

## 2018-05-18 DIAGNOSIS — O36.80X0 PREGNANCY WITH UNCERTAIN FETAL VIABILITY, SINGLE OR UNSPECIFIED FETUS: Primary | ICD-10-CM

## 2018-05-18 DIAGNOSIS — O20.9 VAGINAL BLEEDING IN PREGNANCY, FIRST TRIMESTER: ICD-10-CM

## 2018-05-18 DIAGNOSIS — O20.9 BLEEDING IN EARLY PREGNANCY: ICD-10-CM

## 2018-05-18 LAB — HCG INTACT+B SERPL-ACNC: 7454 MIU/ML

## 2018-05-18 PROCEDURE — 99999 PR PBB SHADOW E&M-EST. PATIENT-LVL III: CPT | Mod: PBBFAC,,, | Performed by: OBSTETRICS & GYNECOLOGY

## 2018-05-18 PROCEDURE — 84702 CHORIONIC GONADOTROPIN TEST: CPT

## 2018-05-18 PROCEDURE — 76801 OB US < 14 WKS SINGLE FETUS: CPT | Mod: 26,S$PBB,, | Performed by: OBSTETRICS & GYNECOLOGY

## 2018-05-18 PROCEDURE — 99213 OFFICE O/P EST LOW 20 MIN: CPT | Mod: S$PBB,TH,25, | Performed by: OBSTETRICS & GYNECOLOGY

## 2018-05-18 PROCEDURE — 99213 OFFICE O/P EST LOW 20 MIN: CPT | Mod: PBBFAC,TH,25 | Performed by: OBSTETRICS & GYNECOLOGY

## 2018-05-18 PROCEDURE — 36415 COLL VENOUS BLD VENIPUNCTURE: CPT

## 2018-05-18 PROCEDURE — 76801 OB US < 14 WKS SINGLE FETUS: CPT | Mod: PBBFAC | Performed by: OBSTETRICS & GYNECOLOGY

## 2018-05-18 RX ORDER — METRONIDAZOLE 500 MG/1
2000 TABLET ORAL ONCE
Qty: 8 TABLET | Refills: 0 | Status: SHIPPED | OUTPATIENT
Start: 2018-05-18 | End: 2018-05-18

## 2018-05-18 RX ORDER — NITROFURANTOIN 25; 75 MG/1; MG/1
CAPSULE ORAL
Refills: 0 | COMMUNITY
Start: 2018-05-09 | End: 2018-08-07

## 2018-05-18 NOTE — PROGRESS NOTES
SUBJECTIVE:   27 y.o. female   for ER f/u    18  Pt went to ED 2 days for vaginal bleeding onset appx 3 days ago  Bleeding was heavy onset 3 days ago, soaked 4-6 large pads in a day  Also has b/l lower abdominal cramping, pain 5/10 without percocet.  Takes percocet twice daily, pain resolved  Bleeding resolved yesterday.    Quant 18 was 4681,   Quant 18 7454  Blood type Rh positive    EXAMINATION:  US OB LESS THAN 14 WKS WITH TRANSVAGINAL (XPD)    CLINICAL HISTORY:  Vag Bleeding;    TECHNIQUE:  Transabdominal sonography of the pelvis was performed, followed by transvaginal sonography to better evaluate the uterus and ovaries.    FINDINGS:  The uterus measures 7.5 x 4.6 x 4.4 cm.  There is a fluid collection within the endometrial cavity with a mean sac diameter 0.73 cm.  There is no fetal pole or yolk sac identified at this time.  There is a 1 cm hypoechoic focus adjacent to this fluid collection suspicious for subchronic hemorrhage.  The right and left ovaries have a normal appearance.  There is no adnexal mass.  There is a mild amount of fluid within the pelvis.   Impression       Small fluid collection within the endometrial cavity which does not contain a yolk sac or fetal pole at this time which may represent an early intrauterine pregnancy.  Pseudo gestational sac of ectopic pregnancy is not excluded.  Recommend correlation with beta HCG and follow-up as warranted.    Small hypoechoic focus adjacent to this probable gestational sac consistent with subchorionic hemorrhage.      Electronically signed by: Luc Watts MD  Date: 2018  Time: 12:56           OB History    Para Term  AB Living   4 1 1   3 1   SAB TAB Ectopic Multiple Live Births   1 2   0 1      # Outcome Date GA Lbr Ok/2nd Weight Sex Delivery Anes PTL Lv   4 Term 14 39w2d  3.285 kg (7 lb 3.9 oz) F CS-LTranv EPI N DAMEON   3 TAB            2 TAB            1 SAB               Obstetric Comments   Gynhx:  reg/5. Mod flow, mild cramp   Denies abnl pap   H/o trichomonas     Past Medical History:   Diagnosis Date    Miscarriage      Past Surgical History:   Procedure Laterality Date    FRACTURE SURGERY      left leg    LEG SURGERY       Social History     Social History    Marital status: Single     Spouse name: N/A    Number of children: N/A    Years of education: N/A     Occupational History    Not on file.     Social History Main Topics    Smoking status: Former Smoker     Packs/day: 0.50     Years: 6.00     Types: Cigarettes    Smokeless tobacco: Former User     Quit date: 8/31/2014      Comment: Pt smokes 4 cigarettes per day.    Alcohol use No      Comment: sometimes    Drug use: No    Sexual activity: Yes     Partners: Male     Birth control/ protection: None     Other Topics Concern    Not on file     Social History Narrative    No narrative on file     Family History   Problem Relation Age of Onset    Hypertension Mother     Hypertension Maternal Grandmother     Kidney disease Maternal Grandmother     Diabetes Maternal Grandmother     Diabetes Paternal Grandmother     Hypertension Paternal Grandmother     Kidney disease Paternal Grandmother     Breast cancer Neg Hx     Colon cancer Neg Hx     Ovarian cancer Neg Hx          Current Outpatient Prescriptions   Medication Sig Dispense Refill    oxyCODONE-acetaminophen (PERCOCET) 5-325 mg per tablet Take 1 tablet by mouth every 4 (four) hours as needed for Pain. This medication causes drowsiness.  Do not drink alcohol or operate motor vehicles while taking. 15 tablet 0     No current facility-administered medications for this visit.      Allergies: Patient has no known allergies.       ROS:  GENERAL: Denies weight gain or weight loss. Feeling well overall.   SKIN: Denies rash or lesions.   HEAD: Denies head injury or headache.   NODES: Denies enlarged lymph nodes.   CHEST: Denies chest pain or shortness of breath.   CARDIOVASCULAR: Denies  "palpitations or left sided chest pain.   ABDOMEN+ lower abdominal cramping  URINARY: No frequency, dysuria, hematuria, or burning on urination.  REPRODUCTIVE:+ vaginal bleeding  BREASTS: The patient performs breast self-examination and denies pain, lumps, or nipple discharge.   HEMATOLOGIC: No easy bruisability or excessive bleeding.  MUSCULOSKELETAL: Denies joint pain or swelling.   NEUROLOGIC: Denies syncope or weakness.   PSYCHIATRIC: Denies depression, anxiety or mood swings.      OBJECTIVE:   /72   Ht 5' 4" (1.626 m)   Wt 66.7 kg (147 lb)   LMP 04/18/2018 (Approximate)   BMI 25.23 kg/m²   The patient appears well, alert, oriented x 3, in no distress.  NECK: negative, no thyromegaly, trachea midline  SKIN: normal, good color, good turgor and no acne, striae, hirsutism  BREAST EXAM: breasts appear normal, no suspicious masses, no skin or nipple changes or axillary nodes, not examined  ABDOMEN: soft, non-tender; bowel sounds normal; no masses,  no organomegaly and no hernias, masses, or hepatosplenomegaly  BLADDER: soft  GENITALIA: normal external genitalia, no erythema, no discharge  URETHRA: normal appearing urethra with no masses, tenderness or lesions and normal urethra, normal urethral meatus  VAGINA: Normal, no blood  CERVIX: no lesions or cervical motion tenderness  UTERUS: normal size, contour, position, consistency, mobility, non-tender  ADNEXA: minimally tender on b/l adnexa     TVUS:  GS in uterus.  ? CRL 2 mm. No adnexal mass noted  Small amount of free fluid    ASSESSMENT:   1.  Pregnancy of uncertain location:  Quant is increasing by 60%.  Rh positive. US from ER reviewed. Notes from ER reviewed.  Intrauterine sac seen on today US.  Possible a 2 mm CRL.  Strict bleeding and ER precautions. Pt to rtc in one week.  "

## 2018-05-18 NOTE — TELEPHONE ENCOUNTER
----- Message from Yogesh Crum MD sent at 5/18/2018  8:35 AM CDT -----  Please remind pt to go to lab for a quant HCG this morning. I need this level back before her afternoon appt.  It takes up to 2-3 hours for result to come back

## 2018-05-18 NOTE — TELEPHONE ENCOUNTER
Patient has been informed to go to lab this morning to have blood work done before appointment this afternoon.

## 2018-05-22 RX ORDER — PROMETHAZINE HYDROCHLORIDE 12.5 MG/1
25 TABLET ORAL EVERY 6 HOURS PRN
Qty: 30 TABLET | Refills: 2 | Status: SHIPPED | OUTPATIENT
Start: 2018-05-22 | End: 2018-08-07

## 2018-05-22 NOTE — TELEPHONE ENCOUNTER
----- Message from Alysia Hart sent at 5/22/2018  9:05 AM CDT -----  Contact: self  Pt is asking for a prescription for Zofran. She states she has been throwing up constantly. Pharmacy is [facility] on [address].   [phone]

## 2018-05-23 ENCOUNTER — TELEPHONE (OUTPATIENT)
Dept: OBSTETRICS AND GYNECOLOGY | Facility: CLINIC | Age: 28
End: 2018-05-23

## 2018-05-23 RX ORDER — ONDANSETRON 4 MG/1
4 TABLET, FILM COATED ORAL DAILY PRN
Qty: 10 TABLET | Refills: 0 | Status: SHIPPED | OUTPATIENT
Start: 2018-05-23 | End: 2018-08-07

## 2018-05-23 NOTE — TELEPHONE ENCOUNTER
----- Message from Kimmy Vieyra sent at 5/23/2018  3:38 PM CDT -----  Contact: Self  Pt calling to speak to a nurse regarding health. [phone]

## 2018-05-30 ENCOUNTER — TELEPHONE (OUTPATIENT)
Dept: OBSTETRICS AND GYNECOLOGY | Facility: CLINIC | Age: 28
End: 2018-05-30

## 2018-05-30 NOTE — TELEPHONE ENCOUNTER
----- Message from Esmer Teague sent at 5/30/2018 10:18 AM CDT -----  Contact: [phone]  Pt wanted to let you know that that she is bleeding and going to [facility] ER.  Thanks......Radha  ---------------------------------------------------------------  5/30/18  @ 1040 ([facility])  SPOKE WITH MS KIRAN, SHE STATED SHE IS BLEEDING EXTREMELY HEAVY , SHE FELT SOMETHING DROP OUT OF HER AND THE BLEEDING WILL NOT STOP, SHE IS [facility]    MESSAGE SENT TO DR ROMO

## 2018-05-30 NOTE — TELEPHONE ENCOUNTER
----- Message from Kimmy Vieyra sent at 5/30/2018 11:03 AM CDT -----  Contact: self  Pt calling to speak to a nurse regarding health. [phone].

## 2018-05-30 NOTE — TELEPHONE ENCOUNTER
Pt states that she is currently at [facility] ER due to bleeding and feeling like a tampon fell out of her.  She was dropping her daughter off to summer [place] and [facility] was the closest place to go.  Pt was informed to obtain all of her record once discharged and Katelyn is aware and will schedule an appt for follow up with Dr. Skyla rios.  Pt voiced understanding. georgie

## 2018-06-04 ENCOUNTER — TELEPHONE (OUTPATIENT)
Dept: OBSTETRICS AND GYNECOLOGY | Facility: CLINIC | Age: 28
End: 2018-06-04

## 2018-06-04 DIAGNOSIS — O03.9 SAB (SPONTANEOUS ABORTION): Primary | ICD-10-CM

## 2018-06-04 NOTE — TELEPHONE ENCOUNTER
After speaking with Dr. Crum, pt is to be seen in office today. But also wants pt to go to the lab in the [place] to have blood work done 2hrs before her appt with Dr. Crum. Informed pt to go to the lab 2hs before she sees Dr. Crum. Pt said that she keeps having blood work done for what? Informed pt that her pregnancy hormones must be checked again. Pt said that if that's the case, she need to go to the lab now. Pt then said that she can't go to the lab right now. Informed pt to go when she can. Dr. Crum would like for it to be done before you see her. Pt acknowledged. CW

## 2018-06-04 NOTE — TELEPHONE ENCOUNTER
----- Message from Kristy Briseno sent at 6/4/2018  9:14 AM CDT -----  Contact: Carolyn [phone]  Pt is requesting a call back from a nurse. She says its an emergency but would not go into details. Please call at your earliest convenience.

## 2018-06-04 NOTE — TELEPHONE ENCOUNTER
Returned patient's call. Pt stated that she went to [facility]. Said that she was told she had a miscarriage. She said that she is still bleeding and having lots of cramping. Stated that she has all of her paper work from [facility]. Pt appt scheduled for pt to see Dr. Crum today . Informed pt that I would go and speak with Dr. Crum then give her a call back. CW

## 2018-06-07 ENCOUNTER — TELEPHONE (OUTPATIENT)
Dept: OBSTETRICS AND GYNECOLOGY | Facility: CLINIC | Age: 28
End: 2018-06-07

## 2018-06-07 NOTE — TELEPHONE ENCOUNTER
----- Message from Kimmy Vieyra sent at 6/7/2018 11:46 AM CDT -----  Contact: Self  Pt calling to speak to a nurse regarding health. [phone].

## 2018-06-07 NOTE — TELEPHONE ENCOUNTER
Dr Curm is not in the office today to assist patient with possible missed AB, Patient was sched for last Friday to come due to bleeding and cramping and patient no showed the appt. Patient has the same complaints today and has been advised to go to the ER. Patient stated never mind and hung up

## 2018-08-07 ENCOUNTER — OFFICE VISIT (OUTPATIENT)
Dept: OBSTETRICS AND GYNECOLOGY | Facility: CLINIC | Age: 28
End: 2018-08-07
Payer: MEDICAID

## 2018-08-07 VITALS
DIASTOLIC BLOOD PRESSURE: 58 MMHG | HEIGHT: 64 IN | BODY MASS INDEX: 25.44 KG/M2 | WEIGHT: 149 LBS | SYSTOLIC BLOOD PRESSURE: 124 MMHG

## 2018-08-07 DIAGNOSIS — Z32.02 NEGATIVE PREGNANCY TEST: Primary | ICD-10-CM

## 2018-08-07 DIAGNOSIS — Z30.45 INITIAL ENCOUNTER FOR MANAGEMENT OF CONTRACEPTIVE PATCH USE: ICD-10-CM

## 2018-08-07 LAB
B-HCG UR QL: NEGATIVE
CTP QC/QA: YES

## 2018-08-07 PROCEDURE — 81025 URINE PREGNANCY TEST: CPT | Mod: PBBFAC | Performed by: OBSTETRICS & GYNECOLOGY

## 2018-08-07 PROCEDURE — 99213 OFFICE O/P EST LOW 20 MIN: CPT | Mod: S$PBB,,, | Performed by: OBSTETRICS & GYNECOLOGY

## 2018-08-07 PROCEDURE — 99213 OFFICE O/P EST LOW 20 MIN: CPT | Mod: PBBFAC | Performed by: OBSTETRICS & GYNECOLOGY

## 2018-08-07 PROCEDURE — 99999 PR PBB SHADOW E&M-EST. PATIENT-LVL III: CPT | Mod: PBBFAC,,, | Performed by: OBSTETRICS & GYNECOLOGY

## 2018-08-07 RX ORDER — NORELGESTROMIN AND ETHINYL ESTRADIOL 35; 150 UG/MG; UG/MG
1 PATCH TRANSDERMAL
Qty: 4 PATCH | Refills: 11 | Status: SHIPPED | OUTPATIENT
Start: 2018-08-07 | End: 2019-03-06

## 2018-08-12 NOTE — PROGRESS NOTES
Subjective:       Patient ID: Carolyn Pearson is a 27 y.o. female.    Chief Complaint:  missed AB      History of Present Illness  HPI  Contraception Counseling  Patient presents for contraception counseling. The patient has no complaints today. The patient is sexually active. Pertinent past medical history: none.  Pt had recent SAB 2018 and denies any problems this.      GYN & OB History  No LMP recorded.   Date of Last Pap: No result found    OB History    Para Term  AB Living   4 1 1   3 1   SAB TAB Ectopic Multiple Live Births   1 2   0 1      # Outcome Date GA Lbr Ok/2nd Weight Sex Delivery Anes PTL Lv   4 Term 14 39w2d  3.285 kg (7 lb 3.9 oz) F CS-LTranv EPI N DAMEON   3 TAB            2 TAB            1 SAB               Obstetric Comments   Gynhx: reg/5. Mod flow, mild cramp   Denies abnl pap   H/o trichomonas       Review of Systems  Review of Systems   Constitutional: Negative.    Respiratory: Negative.    Cardiovascular: Negative.    Gastrointestinal: Negative.    Genitourinary: Negative.    Musculoskeletal: Negative.    Hematological: Negative.    Psychiatric/Behavioral: Negative.    Breast: negative.            Objective:    Physical Exam:   Constitutional: She is oriented to person, place, and time. She appears well-developed and well-nourished. No distress.    HENT:   Head: Atraumatic.     Neck: Normal range of motion.    Cardiovascular: Normal rate.     Pulmonary/Chest: Effort normal.        Abdominal: Soft. She exhibits no distension.             Musculoskeletal: Normal range of motion and moves all extremeties.       Neurological: She is alert and oriented to person, place, and time. No cranial nerve deficit. Coordination normal.    Skin: She is not diaphoretic.    Psychiatric: She has a normal mood and affect. Her behavior is normal. Judgment and thought content normal.          Assessment:        1. Negative pregnancy test    2. Initial encounter for management of  contraceptive patch use                Plan:      1.  Rx Ortho evra, pt instructed on use  2.  Pt up to date with Pap, last done 6/2017

## 2019-02-22 ENCOUNTER — HOSPITAL ENCOUNTER (EMERGENCY)
Facility: HOSPITAL | Age: 29
Discharge: LEFT BEFORE DISCHARGE | End: 2019-02-22
Payer: MEDICAID

## 2019-02-22 VITALS
DIASTOLIC BLOOD PRESSURE: 78 MMHG | OXYGEN SATURATION: 99 % | RESPIRATION RATE: 16 BRPM | HEIGHT: 64 IN | SYSTOLIC BLOOD PRESSURE: 122 MMHG | HEART RATE: 86 BPM | TEMPERATURE: 98 F | WEIGHT: 160 LBS | BODY MASS INDEX: 27.31 KG/M2

## 2019-02-22 LAB
B-HCG UR QL: POSITIVE
CTP QC/QA: YES

## 2019-02-22 PROCEDURE — 99283 EMERGENCY DEPT VISIT LOW MDM: CPT

## 2019-02-22 PROCEDURE — 81025 URINE PREGNANCY TEST: CPT | Performed by: PHYSICIAN ASSISTANT

## 2019-02-23 NOTE — ED NOTES
Pt up at triage desk stating that she is leaving; pt notified that she should stay and will be roomed very soon; pt smiling & laughing and states that she would rather go to [facility]; pt encouraged again to stay and be seen but still states that she is leaving.

## 2019-02-25 ENCOUNTER — LAB VISIT (OUTPATIENT)
Dept: LAB | Facility: HOSPITAL | Age: 29
End: 2019-02-25
Attending: OBSTETRICS & GYNECOLOGY
Payer: MEDICAID

## 2019-02-25 ENCOUNTER — OFFICE VISIT (OUTPATIENT)
Dept: OBSTETRICS AND GYNECOLOGY | Facility: CLINIC | Age: 29
End: 2019-02-25
Payer: MEDICAID

## 2019-02-25 VITALS
WEIGHT: 159.38 LBS | RESPIRATION RATE: 15 BRPM | HEIGHT: 64 IN | SYSTOLIC BLOOD PRESSURE: 103 MMHG | HEART RATE: 81 BPM | BODY MASS INDEX: 27.21 KG/M2 | DIASTOLIC BLOOD PRESSURE: 62 MMHG

## 2019-02-25 DIAGNOSIS — O20.9 BLEEDING IN EARLY PREGNANCY: Primary | ICD-10-CM

## 2019-02-25 DIAGNOSIS — Z32.00 POSSIBLE PREGNANCY: ICD-10-CM

## 2019-02-25 LAB
B-HCG UR QL: POSITIVE
CTP QC/QA: YES
HCG INTACT+B SERPL-ACNC: 2358 MIU/ML

## 2019-02-25 PROCEDURE — 99999 PR PBB SHADOW E&M-EST. PATIENT-LVL III: CPT | Mod: PBBFAC,,, | Performed by: OBSTETRICS & GYNECOLOGY

## 2019-02-25 PROCEDURE — 99999 PR PBB SHADOW E&M-EST. PATIENT-LVL III: ICD-10-PCS | Mod: PBBFAC,,, | Performed by: OBSTETRICS & GYNECOLOGY

## 2019-02-25 PROCEDURE — 99213 PR OFFICE/OUTPT VISIT, EST, LEVL III, 20-29 MIN: ICD-10-PCS | Mod: S$PBB,TH,, | Performed by: OBSTETRICS & GYNECOLOGY

## 2019-02-25 PROCEDURE — 99213 OFFICE O/P EST LOW 20 MIN: CPT | Mod: S$PBB,TH,, | Performed by: OBSTETRICS & GYNECOLOGY

## 2019-02-25 PROCEDURE — 81025 URINE PREGNANCY TEST: CPT | Mod: PBBFAC | Performed by: OBSTETRICS & GYNECOLOGY

## 2019-02-25 PROCEDURE — 36415 COLL VENOUS BLD VENIPUNCTURE: CPT

## 2019-02-25 PROCEDURE — 99213 OFFICE O/P EST LOW 20 MIN: CPT | Mod: PBBFAC,TH | Performed by: OBSTETRICS & GYNECOLOGY

## 2019-02-25 PROCEDURE — 84702 CHORIONIC GONADOTROPIN TEST: CPT

## 2019-02-25 NOTE — PROGRESS NOTES
SUBJECTIVE:   28 y.o. female   for pregnancy confirmation    Patient's last menstrual period was 2019 (approximate)..    Pt LMP was 19, but not sure if it was her period since the cycle was very short 3 days instead of 5 days  She had mild spotting today. Denies cramping/or pain.  Had positive UPT at urgent care 3 days ago        OB History    Para Term  AB Living   5 1 1   4 1   SAB TAB Ectopic Multiple Live Births   2 2   0 1      # Outcome Date GA Lbr Ok/2nd Weight Sex Delivery Anes PTL Lv   5 2018           4 Term 14 39w2d  3.285 kg (7 lb 3.9 oz) F CS-LTranv EPI N DAMEON   3 SAB            2 TAB            1 TAB               Obstetric Comments   Gynhx: reg/5. Mod flow, mild cramp   Denies abnl pap   H/o trichomonas     Past Medical History:   Diagnosis Date    Miscarriage      Past Surgical History:   Procedure Laterality Date    D & C (SUCTION) N/A 2017    Performed by [facility]ara [facility] at [facility] OR    DELIVERY-CEASAREAN SECTION N/A 2014    Performed by Chance Muniz MD at [facility] L&D OR    FRACTURE SURGERY      left leg    LEG SURGERY       Social History     Socioeconomic History    Marital status: Single     Spouse name: Not on file    Number of children: Not on file    Years of education: Not on file    Highest education level: Not on file   Social Needs    Financial resource strain: Not on file    Food insecurity - worry: Not on file    Food insecurity - inability: Not on file    Transportation needs - medical: Not on file    Transportation needs - non-medical: Not on file   Occupational History    Not on file   Tobacco Use    Smoking status: Former Smoker     Packs/day: 0.50     Years: 6.00     Pack years: 3.00     Types: Cigarettes    Smokeless tobacco: Former User     Quit date: 2014    Tobacco comment: Pt smokes 4 cigarettes per day.   Substance and Sexual Activity    Alcohol use: No     Comment: sometimes    Drug use: No     "Sexual activity: Yes     Partners: Male     Birth control/protection: None   Other Topics Concern    Not on file   Social History Narrative    Not on file     Family History   Problem Relation Age of Onset    Hypertension Mother     Hypertension Maternal Grandmother     Kidney disease Maternal Grandmother     Diabetes Maternal Grandmother     Diabetes Paternal Grandmother     Hypertension Paternal Grandmother     Kidney disease Paternal Grandmother     Breast cancer Neg Hx     Colon cancer Neg Hx     Ovarian cancer Neg Hx          Current Outpatient Medications   Medication Sig Dispense Refill    norelgestromin-ethinyl estradiol (ORTHO EVRA) 150-35 mcg/24 hr Place 1 patch onto the skin every 7 days. 4 patch 11     No current facility-administered medications for this visit.      Allergies: Patient has no known allergies.       ROS:  GENERAL: Denies weight gain or weight loss. Feeling well overall.   SKIN: Denies rash or lesions.   HEAD: Denies head injury or headache.   NODES: Denies enlarged lymph nodes.   CHEST: Denies chest pain or shortness of breath.   CARDIOVASCULAR: Denies palpitations or left sided chest pain.   ABDOMEN: No abdominal pain, constipation, diarrhea, nausea, vomiting or rectal bleeding.   URINARY: No frequency, dysuria, hematuria, or burning on urination.  REPRODUCTIVE: see HPI  BREASTS: The patient performs breast self-examination and denies pain, lumps, or nipple discharge.   HEMATOLOGIC: No easy bruisability or excessive bleeding.  MUSCULOSKELETAL: Denies joint pain or swelling.   NEUROLOGIC: Denies syncope or weakness.   PSYCHIATRIC: Denies depression, anxiety or mood swings.      OBJECTIVE:   /62   Pulse 81   Resp 15   Ht 5' 4" (1.626 m)   Wt 72.3 kg (159 lb 6.3 oz)   LMP 01/20/2019 (Approximate)   BMI 27.36 kg/m²   The patient appears well, alert, oriented x 3, in no distress.  NECK: negative, no thyromegaly, trachea midline  SKIN: normal, good color, good turgor " and no acne, striae, hirsutism  BREAST EXAM: breasts appear normal, no suspicious masses, no skin or nipple changes or axillary nodes  ABDOMEN: soft, non-tender; bowel sounds normal; no masses,  no organomegaly and no hernias, masses, or hepatosplenomegaly  GYN: deferred      ASSESSMENT:   1.  Positive UPT:  Light spotting, will check quant HCG today and repeat in 2 days.  Rh positive  2.  rtc in 1-2 weeks for initial OB  3.  Will need gc/ct and affirm at next visit

## 2019-02-28 ENCOUNTER — NURSE TRIAGE (OUTPATIENT)
Dept: ADMINISTRATIVE | Facility: CLINIC | Age: 29
End: 2019-02-28

## 2019-02-28 NOTE — TELEPHONE ENCOUNTER
Reason for Disposition   Mild abdominal pain (all triage questions negative)    Protocols used: ST PREGNANCY - ABDOMINAL PAIN LESS THAN 20 WEEKS EGA-A-AH    -Abdominal cramping (6/10 on pain scale)  -LMP 1/25/19, recently had a positive pregnancy test, has not confirmed pregnancy yet with ob she says   -denies other symptoms  -Gave care advice as documented

## 2019-03-06 ENCOUNTER — LAB VISIT (OUTPATIENT)
Dept: LAB | Facility: HOSPITAL | Age: 29
End: 2019-03-06
Attending: OBSTETRICS & GYNECOLOGY
Payer: MEDICAID

## 2019-03-06 ENCOUNTER — INITIAL PRENATAL (OUTPATIENT)
Dept: OBSTETRICS AND GYNECOLOGY | Facility: CLINIC | Age: 29
End: 2019-03-06
Payer: MEDICAID

## 2019-03-06 VITALS — WEIGHT: 158 LBS | BODY MASS INDEX: 27.12 KG/M2

## 2019-03-06 DIAGNOSIS — Z34.81 ENCOUNTER FOR SUPERVISION OF OTHER NORMAL PREGNANCY IN FIRST TRIMESTER: ICD-10-CM

## 2019-03-06 DIAGNOSIS — Z3A.01 6 WEEKS GESTATION OF PREGNANCY: ICD-10-CM

## 2019-03-06 DIAGNOSIS — O34.219 HISTORY OF CESAREAN SECTION COMPLICATING PREGNANCY: ICD-10-CM

## 2019-03-06 DIAGNOSIS — O34.219 HISTORY OF CESAREAN DELIVERY AFFECTING PREGNANCY: ICD-10-CM

## 2019-03-06 DIAGNOSIS — O20.9 BLEEDING IN EARLY PREGNANCY: Primary | ICD-10-CM

## 2019-03-06 DIAGNOSIS — O99.331 TOBACCO SMOKING AFFECTING PREGNANCY IN FIRST TRIMESTER: ICD-10-CM

## 2019-03-06 DIAGNOSIS — O20.9 BLEEDING IN EARLY PREGNANCY: ICD-10-CM

## 2019-03-06 LAB
ERYTHROCYTE [DISTWIDTH] IN BLOOD BY AUTOMATED COUNT: 13.4 %
HCG INTACT+B SERPL-ACNC: NORMAL MIU/ML
HCT VFR BLD AUTO: 37.3 %
HGB BLD-MCNC: 12.5 G/DL
MCH RBC QN AUTO: 29.3 PG
MCHC RBC AUTO-ENTMCNC: 33.5 G/DL
MCV RBC AUTO: 87 FL
PLATELET # BLD AUTO: 238 K/UL
PMV BLD AUTO: 11.4 FL
RBC # BLD AUTO: 4.27 M/UL
WBC # BLD AUTO: 5.06 K/UL

## 2019-03-06 PROCEDURE — 86762 RUBELLA ANTIBODY: CPT

## 2019-03-06 PROCEDURE — 76801 OB US < 14 WKS SINGLE FETUS: CPT | Mod: PBBFAC | Performed by: OBSTETRICS & GYNECOLOGY

## 2019-03-06 PROCEDURE — 87086 URINE CULTURE/COLONY COUNT: CPT

## 2019-03-06 PROCEDURE — 81220 CFTR GENE COM VARIANTS: CPT

## 2019-03-06 PROCEDURE — 80074 ACUTE HEPATITIS PANEL: CPT

## 2019-03-06 PROCEDURE — 99204 OFFICE O/P NEW MOD 45 MIN: CPT | Mod: S$PBB,TH,25, | Performed by: OBSTETRICS & GYNECOLOGY

## 2019-03-06 PROCEDURE — 36415 COLL VENOUS BLD VENIPUNCTURE: CPT

## 2019-03-06 PROCEDURE — 76801 OB US < 14 WKS SINGLE FETUS: CPT | Mod: 26,S$PBB,, | Performed by: OBSTETRICS & GYNECOLOGY

## 2019-03-06 PROCEDURE — 85027 COMPLETE CBC AUTOMATED: CPT

## 2019-03-06 PROCEDURE — 87510 GARDNER VAG DNA DIR PROBE: CPT

## 2019-03-06 PROCEDURE — 86850 RBC ANTIBODY SCREEN: CPT

## 2019-03-06 PROCEDURE — 99999 PR PBB SHADOW E&M-EST. PATIENT-LVL II: CPT | Mod: PBBFAC,,, | Performed by: OBSTETRICS & GYNECOLOGY

## 2019-03-06 PROCEDURE — 76801 PR US, OB <14WKS, TRANSABD, SINGLE GESTATION: ICD-10-PCS | Mod: 26,S$PBB,, | Performed by: OBSTETRICS & GYNECOLOGY

## 2019-03-06 PROCEDURE — 86703 HIV-1/HIV-2 1 RESULT ANTBDY: CPT

## 2019-03-06 PROCEDURE — 99999 PR PBB SHADOW E&M-EST. PATIENT-LVL II: ICD-10-PCS | Mod: PBBFAC,,, | Performed by: OBSTETRICS & GYNECOLOGY

## 2019-03-06 PROCEDURE — 84702 CHORIONIC GONADOTROPIN TEST: CPT

## 2019-03-06 PROCEDURE — 99204 PR OFFICE/OUTPT VISIT, NEW, LEVL IV, 45-59 MIN: ICD-10-PCS | Mod: S$PBB,TH,25, | Performed by: OBSTETRICS & GYNECOLOGY

## 2019-03-06 PROCEDURE — 81000 URINALYSIS NONAUTO W/SCOPE: CPT

## 2019-03-06 PROCEDURE — 87480 CANDIDA DNA DIR PROBE: CPT | Mod: 59

## 2019-03-06 PROCEDURE — 99212 OFFICE O/P EST SF 10 MIN: CPT | Mod: PBBFAC,TH | Performed by: OBSTETRICS & GYNECOLOGY

## 2019-03-06 PROCEDURE — 87491 CHLMYD TRACH DNA AMP PROBE: CPT

## 2019-03-06 PROCEDURE — 83020 HEMOGLOBIN ELECTROPHORESIS: CPT

## 2019-03-06 PROCEDURE — 86592 SYPHILIS TEST NON-TREP QUAL: CPT

## 2019-03-06 NOTE — PROGRESS NOTES
Patient presented today for new Ob appt.    Planned pregnancy: yes    ROS: NEG except    VB?: light, resolved  Cramping?:  no  Nausea/vomitting?: no    PMH: none  OBH: CD x 1 due to FTP  GynHx: h/o trichomonas    Wt 71.7 kg (158 lb)   LMP 01/20/2019 (Approximate)   BMI 27.12 kg/m²   Phys Exam:  Abdomen: soft,NT, ND  Vulva and vagina appear normal. Bimanual exam reveals normal uterus and adnexa.    Transvaginal US:  Single IUP  YS seen  GS in utero  CRL 0.44mm, 6w1d   bpm  Adnexa left ovary normal with 1.3 cm corpus luteum. Right ovary normal  Today's US with [facility] 10/29/19  LMP 1/20/2019 , with [facility] 10/27/19  Final IRMA:  10/27/19    1.  Early IUP:       -prenatal labs today       -GC/CT, affirm and papsmear done today       -Bleeding precautions       -Aneuploidy screen offered. Patient desires 2nd trimester aneuploidy screening       -Diet, exercise, and social habits d/w patients. Patient was counseled today on  proper weight gain based on the [place] [employer]'s recommendations based  on her pre-pregnancy BMI of 27, total pregnancy weight gain of 15-25 lbs.       -Discussed foods to avoid in pregnancy (i.e. sushi, fish that are high in mercury,  deli meat, and unpasteurized cheeses). Discussed  prenatal vitamin options and  safe medications in pregnancy.     2. Tobacco abuse:  Discussed risk of the following including but not limited to sab, stillbirth, abruption, ptl, pprom. Pt verbalized understanding and will    3. Discussed how call group function, we do most of our deliveries however other providers will be covering and possibility will be there at the time of deliveries.  Pt voiced understanding.  4. RTC in 4 weeks for Juan visit

## 2019-03-07 LAB
AMORPH CRY URNS QL MICRO: ABNORMAL
BACTERIA #/AREA URNS HPF: ABNORMAL /HPF
BILIRUB UR QL STRIP: NEGATIVE
C TRACH DNA SPEC QL NAA+PROBE: NOT DETECTED
CAOX CRY URNS QL MICRO: ABNORMAL
CLARITY UR: ABNORMAL
COLOR UR: ABNORMAL
GLUCOSE UR QL STRIP: NEGATIVE
HAV IGM SERPL QL IA: NEGATIVE
HBV CORE IGM SERPL QL IA: NEGATIVE
HBV SURFACE AG SERPL QL IA: NEGATIVE
HBV SURFACE AG SERPL QL IA: NEGATIVE
HCV AB SERPL QL IA: NEGATIVE
HGB UR QL STRIP: NEGATIVE
HIV 1+2 AB+HIV1 P24 AG SERPL QL IA: NEGATIVE
KETONES UR QL STRIP: NEGATIVE
LEUKOCYTE ESTERASE UR QL STRIP: NEGATIVE
MICROSCOPIC COMMENT: ABNORMAL
N GONORRHOEA DNA SPEC QL NAA+PROBE: NOT DETECTED
NITRITE UR QL STRIP: NEGATIVE
PH UR STRIP: 6 [PH] (ref 5–8)
PROT UR QL STRIP: NEGATIVE
RBC #/AREA URNS HPF: 1 /HPF (ref 0–4)
RPR SER QL: NORMAL
RUBV IGG SER-ACNC: 25.8 IU/ML
RUBV IGG SER-IMP: REACTIVE
SP GR UR STRIP: 1.03 (ref 1–1.03)
SQUAMOUS #/AREA URNS HPF: 3 /HPF
URN SPEC COLLECT METH UR: ABNORMAL
UROBILINOGEN UR STRIP-ACNC: ABNORMAL EU/DL
WBC #/AREA URNS HPF: 0 /HPF (ref 0–5)

## 2019-03-08 LAB
ABO + RH BLD: NORMAL
BLD GP AB SCN CELLS X3 SERPL QL: NORMAL

## 2019-03-09 LAB — BACTERIA UR CULT: NORMAL

## 2019-03-10 LAB
BACTERIAL VAGINOSIS DNA: POSITIVE
CANDIDA GLABRATA DNA: NEGATIVE
CANDIDA KRUSEI DNA: NEGATIVE
CANDIDA RRNA VAG QL PROBE: POSITIVE
T VAGINALIS RRNA GENITAL QL PROBE: NEGATIVE

## 2019-03-11 ENCOUNTER — HOSPITAL ENCOUNTER (EMERGENCY)
Facility: HOSPITAL | Age: 29
Discharge: HOME | End: 2019-03-11
Attending: EMERGENCY MEDICINE
Payer: MEDICAID

## 2019-03-11 VITALS
SYSTOLIC BLOOD PRESSURE: 108 MMHG | WEIGHT: 155 LBS | BODY MASS INDEX: 26.46 KG/M2 | OXYGEN SATURATION: 99 % | TEMPERATURE: 98 F | DIASTOLIC BLOOD PRESSURE: 59 MMHG | HEART RATE: 75 BPM | HEIGHT: 64 IN | RESPIRATION RATE: 18 BRPM

## 2019-03-11 DIAGNOSIS — R07.9 CHEST PAIN: ICD-10-CM

## 2019-03-11 DIAGNOSIS — Z34.91 FIRST TRIMESTER PREGNANCY: Primary | ICD-10-CM

## 2019-03-11 LAB
ALBUMIN SERPL BCP-MCNC: 3.9 G/DL
ALP SERPL-CCNC: 43 U/L
ALT SERPL W/O P-5'-P-CCNC: 15 U/L
ANION GAP SERPL CALC-SCNC: 5 MMOL/L
AST SERPL-CCNC: 17 U/L
BACTERIA GENITAL QL WET PREP: ABNORMAL
BASOPHILS # BLD AUTO: 0.01 K/UL
BASOPHILS NFR BLD: 0.2 %
BILIRUB SERPL-MCNC: 0.7 MG/DL
BILIRUB UR QL STRIP: NEGATIVE
BUN SERPL-MCNC: 6 MG/DL
C TRACH DNA SPEC QL NAA+PROBE: NOT DETECTED
CALCIUM SERPL-MCNC: 9.6 MG/DL
CFTR MUT ANL BLD/T: NORMAL
CHLORIDE SERPL-SCNC: 104 MMOL/L
CLARITY UR: ABNORMAL
CLUE CELLS VAG QL WET PREP: ABNORMAL
CO2 SERPL-SCNC: 25 MMOL/L
COLOR UR: YELLOW
CREAT SERPL-MCNC: 0.7 MG/DL
DIFFERENTIAL METHOD: NORMAL
EOSINOPHIL # BLD AUTO: 0 K/UL
EOSINOPHIL NFR BLD: 0.9 %
ERYTHROCYTE [DISTWIDTH] IN BLOOD BY AUTOMATED COUNT: 13.5 %
EST. GFR  (AFRICAN AMERICAN): >60 ML/MIN/1.73 M^2
EST. GFR  (NON AFRICAN AMERICAN): >60 ML/MIN/1.73 M^2
FILAMENT FUNGI VAG WET PREP-#/AREA: ABNORMAL
GLUCOSE SERPL-MCNC: 92 MG/DL
GLUCOSE UR QL STRIP: NEGATIVE
HCG INTACT+B SERPL-ACNC: NORMAL MIU/ML
HCT VFR BLD AUTO: 39.4 %
HGB A MFR BLD ELPH: 96.3 % (ref 95.8–98)
HGB A2 MFR BLD: 2.9 % (ref 2–3.3)
HGB BLD-MCNC: 13 G/DL
HGB F MFR BLD: 0.8 % (ref 0–0.9)
HGB FRACT BLD ELPH-IMP: NORMAL
HGB OTHER MFR BLD ELPH: 0 %
HGB UR QL STRIP: NEGATIVE
KETONES UR QL STRIP: ABNORMAL
LEUKOCYTE ESTERASE UR QL STRIP: NEGATIVE
LYMPHOCYTES # BLD AUTO: 1.3 K/UL
LYMPHOCYTES NFR BLD: 28.5 %
MCH RBC QN AUTO: 29.3 PG
MCHC RBC AUTO-ENTMCNC: 33 G/DL
MCV RBC AUTO: 89 FL
MONOCYTES # BLD AUTO: 0.4 K/UL
MONOCYTES NFR BLD: 8.9 %
N GONORRHOEA DNA SPEC QL NAA+PROBE: NOT DETECTED
NEUTROPHILS # BLD AUTO: 2.8 K/UL
NEUTROPHILS NFR BLD: 61.5 %
NITRITE UR QL STRIP: NEGATIVE
PH UR STRIP: 8 [PH] (ref 5–8)
PLATELET # BLD AUTO: 243 K/UL
PMV BLD AUTO: 11.7 FL
POTASSIUM SERPL-SCNC: 3.9 MMOL/L
PROT SERPL-MCNC: 7.3 G/DL
PROT UR QL STRIP: NEGATIVE
RBC # BLD AUTO: 4.43 M/UL
SODIUM SERPL-SCNC: 134 MMOL/L
SP GR UR STRIP: 1.02 (ref 1–1.03)
SPECIMEN SOURCE: ABNORMAL
T VAGINALIS GENITAL QL WET PREP: ABNORMAL
THEVP VARIANT 2: NORMAL
THEVP VARIANT 3: NORMAL
URN SPEC COLLECT METH UR: ABNORMAL
UROBILINOGEN UR STRIP-ACNC: NEGATIVE EU/DL
WBC # BLD AUTO: 4.49 K/UL
WBC #/AREA VAG WET PREP: ABNORMAL
YEAST GENITAL QL WET PREP: ABNORMAL

## 2019-03-11 PROCEDURE — 85025 COMPLETE CBC W/AUTO DIFF WBC: CPT

## 2019-03-11 PROCEDURE — 84702 CHORIONIC GONADOTROPIN TEST: CPT

## 2019-03-11 PROCEDURE — 87491 CHLMYD TRACH DNA AMP PROBE: CPT

## 2019-03-11 PROCEDURE — 81003 URINALYSIS AUTO W/O SCOPE: CPT

## 2019-03-11 PROCEDURE — 99285 EMERGENCY DEPT VISIT HI MDM: CPT | Mod: 25

## 2019-03-11 PROCEDURE — 80053 COMPREHEN METABOLIC PANEL: CPT

## 2019-03-11 PROCEDURE — 87210 SMEAR WET MOUNT SALINE/INK: CPT

## 2019-03-11 RX ORDER — METRONIDAZOLE 500 MG/1
500 TABLET ORAL EVERY 12 HOURS
Qty: 14 TABLET | Refills: 0 | Status: SHIPPED | OUTPATIENT
Start: 2019-03-11 | End: 2019-03-18

## 2019-03-11 NOTE — ED TRIAGE NOTES
"Pt is currently pregnant. LMP 1/20/2019. Pr c/o pain under RIGHT breast since yesterday. Describes pain as "sharp", pain is 8/10. Also c/o N/V, chills. Denies taking meds PTA  "

## 2019-03-12 NOTE — ED PROVIDER NOTES
"Encounter Date: 3/11/2019       History     Chief Complaint   Patient presents with    Chest Pain     " I have sharp pain under my right breast". Pt reports nausea and vomiting     Chief complaint:  Chest pain    History of present illness:  Patient is a 28-year-old female who reports that she has pain under her right breast.  She also reports she has abdominal pain secondary to pregnancy.  Last menstrual period was 2019.  She is G5 T1 P 0 a 3 L1.  She reports associated cough, nausea and vomiting, vaginal bleeding.  She is O-positive by health record.  She also reports urinary frequency and urgency without dysuria or hematuria.      The history is provided by the patient and medical records. No  was used.     Review of patient's allergies indicates:  No Known Allergies  Past Medical History:   Diagnosis Date    Hypertension     Miscarriage      Past Surgical History:   Procedure Laterality Date     SECTION      D & C (SUCTION) N/A 2017    Performed by Levy [facility]ara [facility] at [facility] OR    DELIVERY-CEASAREAN SECTION N/A 2014    Performed by Chance Muniz MD at [facility] OR    FRACTURE SURGERY      left leg    LEG SURGERY       Family History   Problem Relation Age of Onset    Hypertension Mother     Hypertension Maternal Grandmother     Kidney disease Maternal Grandmother     Diabetes Maternal Grandmother     Diabetes Paternal Grandmother     Hypertension Paternal Grandmother     Kidney disease Paternal Grandmother     Breast cancer Neg Hx     Colon cancer Neg Hx     Ovarian cancer Neg Hx      Social History     Tobacco Use    Smoking status: Former Smoker     Packs/day: 0.50     Years: 6.00     Pack years: 3.00     Types: Cigarettes    Smokeless tobacco: Former User     Quit date: 2014    Tobacco comment: Pt smokes 4 cigarettes per day.   Substance Use Topics    Alcohol use: No     Comment: sometimes    Drug use: No     Review of Systems "   Constitutional: Negative for chills, fatigue and fever.   HENT: Negative for congestion, ear discharge, ear pain, postnasal drip, rhinorrhea, sinus pressure, sneezing, sore throat and voice change.    Eyes: Negative for discharge and itching.   Respiratory: Positive for cough. Negative for shortness of breath and wheezing.    Cardiovascular: Positive for chest pain. Negative for palpitations and leg swelling.   Gastrointestinal: Positive for abdominal pain, nausea and vomiting. Negative for constipation and diarrhea.   Endocrine: Negative for polydipsia, polyphagia and polyuria.   Genitourinary: Positive for vaginal bleeding. Negative for dysuria, frequency, hematuria, urgency, vaginal discharge and vaginal pain.   Musculoskeletal: Negative for arthralgias and myalgias.   Skin: Negative for rash and wound.   Neurological: Negative for dizziness, seizures, syncope, weakness and numbness.   Hematological: Negative for adenopathy. Does not bruise/bleed easily.   Psychiatric/Behavioral: Negative for self-injury and suicidal ideas. The patient is not nervous/anxious.        Physical Exam     Initial Vitals [03/11/19 0923]   BP Pulse Resp Temp SpO2   107/67 83 20 98.8 °F (37.1 °C) 100 %      MAP       --         Physical Exam    Nursing note and vitals reviewed.  Constitutional: She appears well-developed and well-nourished.   HENT:   Head: Normocephalic and atraumatic.   Right Ear: External ear normal.   Left Ear: External ear normal.   Nose: Nose normal. No epistaxis.   Mouth/Throat: Oropharynx is clear and moist.   Eyes: Conjunctivae and EOM are normal. Pupils are equal, round, and reactive to light. Right eye exhibits no discharge. Left eye exhibits no discharge.   Neck: Normal range of motion.   Cardiovascular: Regular rhythm, S1 normal, S2 normal and normal heart sounds. Exam reveals no gallop.    No murmur heard.  Pulmonary/Chest: Effort normal and breath sounds normal. No respiratory distress. She has no  decreased breath sounds. She has no wheezes. She has no rhonchi. She has no rales.   Abdominal: Soft. Normal appearance and bowel sounds are normal. She exhibits no distension. There is no tenderness. Hernia confirmed negative in the right inguinal area and confirmed negative in the left inguinal area.   Genitourinary: Vagina normal and uterus normal. Pelvic exam was performed with patient prone. No labial fusion. There is no rash, tenderness, lesion or injury on the right labia. There is no rash, tenderness, lesion or injury on the left labia. Uterus is not deviated, not enlarged, not fixed and not tender. Cervix exhibits no motion tenderness, no discharge and no friability. Right adnexum displays no mass, no tenderness and no fullness. Left adnexum displays no mass, no tenderness and no fullness. No erythema, tenderness or bleeding in the vagina. No foreign body in the vagina. No signs of injury around the vagina. No vaginal discharge found.   Musculoskeletal: Normal range of motion.   Lymphadenopathy:        Right: No inguinal adenopathy present.        Left: No inguinal adenopathy present.   Neurological: She is alert and oriented to person, place, and time.   Skin: Skin is dry. Capillary refill takes less than 2 seconds.         ED Course   Procedures  Labs Reviewed   COMPREHENSIVE METABOLIC PANEL - Abnormal; Notable for the following components:       Result Value    Sodium 134 (*)     Alkaline Phosphatase 43 (*)     Anion Gap 5 (*)     All other components within normal limits   URINALYSIS, REFLEX TO URINE CULTURE - Abnormal; Notable for the following components:    Appearance, UA Hazy (*)     Ketones, UA Trace (*)     All other components within normal limits   VAGINAL SCREEN - Abnormal; Notable for the following components:    WBC - Vaginal Screen Few (*)     Bacteria - Vaginal Screen Few (*)     All other components within normal limits   C. TRACHOMATIS/N. GONORRHOEAE BY AMP DNA   CBC W/ AUTO DIFFERENTIAL    HCG, QUANTITATIVE, PREGNANCY     EKG Readings: (Independently Interpreted)   Initial Reading: No STEMI. Rhythm: Normal Sinus Rhythm. Heart Rate: 71. Ectopy: No Ectopy.       Imaging Results          US OB Less Than 14 Wks with Transvag(xpd (Final result)  Result time 03/11/19 12:21:19    Final result by Keny Waller MD (03/11/19 12:21:19)                 Impression:      Single living intrauterine gestation, crown-rump length correlates to an estimated gestational age of 6 weeks 6 days, cardiac activity documented at 123 beats per minute.    Small amount of fluid in the pelvis.      Electronically signed by: Keny Waller MD  Date:    03/11/2019  Time:    12:21             Narrative:    EXAMINATION:  US OB LESS THAN 14 WKS WITH TRANSVAGINAL (XPD)    CLINICAL HISTORY:  vag bleeding during preg;    TECHNIQUE:  Transabdominal sonography of the pelvis was performed, followed by transvaginal sonography to better evaluate the uterus and ovaries.    COMPARISON:  None.    FINDINGS:  There is a single living intrauterine gestation, crown-rump length correlates to an estimated gestational age of 6 weeks 6 days, cardiac activity documented at 123 beats per minute.  The uterine parenchyma is otherwise homogeneous.  The cervix is unremarkable.    The right ovary measures approximately 2.8 x 2.0 x 1.8 cm.  The left ovary measures approximately 3.6 x 2.8 x 2.2 cm, and contains a corpus luteal cyst measuring 2.4 x 2.7 x 2.3 cm.  Arterial and venous flow is documented to the ovaries bilaterally.  There is a small amount of fluid in the pelvis.                                       APC / Resident Notes:   Initial assessment:  Chest pain in a 28-year-old female who is gravid    Differential diagnosis includes DVT PE, ACS MI, pleuritic chest pain.    Orders include CBC, chemistry, urinalysis, UPT, EKG, vaginal cream, GC chlamydia, setup pelvic tray, AB0, hCG.    I doubt ACS MI S patient denies chest pain. She is otherwise a  28-year-old healthy female.  She is afebrile and nontoxic.  She is not diaphoretic.  I doubt DVT PE as her heart rate is 75 pulse ox 99%.  This is most likely pleuritic chest pain secondary to cough.  I ordered an ectopic workup secondary to patient's complaint of vaginal bleeding and abdominal pain.              ED Course as of Mar 11 2105   Mon Mar 11, 2019   0934 BP: 107/67 [VC]   0935 Temp: 98.8 °F (37.1 °C) [VC]   0935 Temp src: Oral [VC]   0935 Pulse: 83 [VC]   0935 Resp: 20 [VC]   0935 SpO2: 100 % [VC]   1036 Vag exam.  Closed cervix, no cmt, white discharge present. Suprapubic pain with palpation  [VC]   1100 CBC: leukocyte count was normal, the H&H was normal. The platelet count was normal.      [VC]   1125 The chemistry was negative for hypo-or hyper natremia, kalemia, chloridemia, or other electrolyte abnormalities; BUN and creatinine were within normal limits indicating normal kidney function, ALT and AST were within normal limits indicating normal liver function, there was no transaminitis.      Vaginal screen is negative for tricomonas, clue cells (indicating no bv), yeast, or excess bacteria      [VC]   1136 UA is negative for infection, no nitrites, leukocytes, blood, or protein present.    [VC]   1205 6 3/7 gestation by pregnancy wheel.   [VC]   1205 Correlates to pregnancy wheel 6 3/7. hCG Quant: [id number] [VC]   1205 BP: (!) 108/59 [VC]   1205 Temp: 98.4 °F (36.9 °C) [VC]   1205 Temp src: Oral [VC]   1205 Pulse: 75 [VC]   1205 Resp: 18 [VC]   1205 SpO2: 99 % [VC]   1215 BP: (!) 108/59 [VC]   1215 Temp: 98.4 °F (36.9 °C) [VC]   1215 Temp src: Oral [VC]   1215 Pulse: 75 [VC]   1215 Resp: 18 [VC]   1215 SpO2: 99 % [VC]   1225 Single living intrauterine gestation, crown-rump length correlates to an estimated gestational age of 6 weeks 6 days, cardiac activity documented at 123 beats per minute.    Small amount of fluid in the pelvis. US OB Less Than 14 Wks with Transvag(xpd [VC]      ED Course User  Index  [VC] Fady Watt DNP     Clinical Impression:       ICD-10-CM ICD-9-CM   1. First trimester pregnancy Z34.91 V22.2   2. Chest pain R07.9 786.50     Patient reported abdominal pain was localized to the mid suprapubic region.  Physical assessment and laboratory analysis indicated no abnormalities.  Patient will be discharged home in good condition to follow up with her OBGYN.  She should return for any worsening or changes in condition.  She is cautioned not to use any over-the-counter medications or treatments.    Disposition:   Disposition: Discharged  Condition: Stable                        Fady Watt DNP  03/11/19 4998

## 2019-03-15 ENCOUNTER — NURSE TRIAGE (OUTPATIENT)
Dept: ADMINISTRATIVE | Facility: CLINIC | Age: 29
End: 2019-03-15

## 2019-03-15 ENCOUNTER — TELEPHONE (OUTPATIENT)
Dept: OBSTETRICS AND GYNECOLOGY | Facility: CLINIC | Age: 29
End: 2019-03-15

## 2019-03-15 NOTE — TELEPHONE ENCOUNTER
3/15/19 @ 7827  SPOKE WITH MS KIRAN, INFORMED HER THAT SHE NEEDS TO  KEEP MEALS SMALL, FREQUENT AND SIMPLE AVOID FRIED FOODS, GREASY FOOD AND SPICY FOODS, PT CAN HAVE DRY TOAST , CRACKERS , STAY HYDRATED DRINK WATER , SPRITE , GINGER ALE, , SHE CAN ALSO TAKE, 1 TAB OF VITAMIN B6 AND 1/2 TAB OF UNISOM 2 TIME A DAY        ----- Message from Eden Bynum sent at 3/15/2019  1:56 PM CDT -----  Contact: Self/ [phone]  Patient need staff to give her a call, regarding her being nauseated.  Thank you.

## 2019-03-15 NOTE — TELEPHONE ENCOUNTER
Pt states she was advised to use OTC for nausea at her OB appt. Was told at pharmacy no OTC for nausea. Calling to find out what was the name of the medication OTC that was recommended.    Reason for Disposition   Caller has NON-URGENT medication question about med that PCP prescribed and triager unable to answer question    Protocols used: MEDICATION QUESTION CALL-A-AH

## 2019-03-15 NOTE — TELEPHONE ENCOUNTER
CALL ATTEMPT TO PT UNSUCCESSFUL , MESSAGE LEFT FOR PT TO RETURN CALL TO OFFICE CONCERNING OTC NAUSEA MEDICATION

## 2019-03-22 ENCOUNTER — TELEPHONE (OUTPATIENT)
Dept: OBSTETRICS AND GYNECOLOGY | Facility: CLINIC | Age: 29
End: 2019-03-22

## 2019-03-22 DIAGNOSIS — O21.9 NAUSEA AND VOMITING IN PREGNANCY: Primary | ICD-10-CM

## 2019-03-22 RX ORDER — PROMETHAZINE HYDROCHLORIDE 25 MG/1
TABLET ORAL
Qty: 60 TABLET | Refills: 1 | Status: SHIPPED | OUTPATIENT
Start: 2019-03-22 | End: 2019-04-13

## 2019-03-22 NOTE — TELEPHONE ENCOUNTER
----- Message from Arin Gerber MA sent at 3/22/2019  1:44 PM CDT -----  Contact: Self   If possible can you send rx for nausea  ----- Message -----  From: Sonya Marie  Sent: 3/22/2019   1:31 PM  To: [facility] Staff    Patient says she has extreme nausea , she feels she cant even tend to herself and her child. She is asking to be advised as soon as possible. Please call at [phone].      [facility] [address] [place] [address] [address] [phone] (Phone)  [phone] (Fax)

## 2019-04-02 ENCOUNTER — TELEPHONE (OUTPATIENT)
Dept: OBSTETRICS AND GYNECOLOGY | Facility: CLINIC | Age: 29
End: 2019-04-02

## 2019-04-02 RX ORDER — PROMETHAZINE HYDROCHLORIDE 25 MG/1
25 SUPPOSITORY RECTAL EVERY 6 HOURS PRN
Qty: 30 SUPPOSITORY | Refills: 1 | Status: SHIPPED | OUTPATIENT
Start: 2019-04-02 | End: 2019-04-13

## 2019-04-02 RX ORDER — METOCLOPRAMIDE 5 MG/1
5 TABLET ORAL
Qty: 90 TABLET | Refills: 2 | Status: SHIPPED | OUTPATIENT
Start: 2019-04-02 | End: 2019-04-12

## 2019-04-02 NOTE — TELEPHONE ENCOUNTER
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 MESSAGE SENT TO DR ROMO TO SEE IF SHE WILL ORDER ZOFRAN PER PT REQUEST    ----- Message from Kristy Briseno sent at 4/2/2019  8:04 AM CDT -----  Contact: Carolyn [phone]  Type: Patient Call Back    Who called:Carolyn    What is the request in detail: the patient is requesting another nausea medication sent in to the staff. She reports that the promethazine pills given to her are too strong.    Can the clinic reply by [facility]?no    Would the patient rather a call back or a response via [facility]? Call back    Best call back number:[phone]

## 2019-04-02 NOTE — TELEPHONE ENCOUNTER
4/2/19 @ 5386  CALL ATTEMPT TO PT UNSUCCESSFUL , MESSAGE LEFT FOR PT TO RETURN CALL TO OFFICE CONCERNING MEDICATION SENT TO PHARMACY FOR NAUSEA      MD Carlin Horner Mai, LPN   Caller: Unspecified (Today,  8:52 AM)             I preferred for her to try phenergan suppository first, will send med.   I will send some reglan as well   If that not working, will send zofran

## 2019-04-03 ENCOUNTER — HOSPITAL ENCOUNTER (INPATIENT)
Facility: HOSPITAL | Age: 29
LOS: 2 days | Discharge: HOME | DRG: 833 | End: 2019-04-05
Attending: OBSTETRICS & GYNECOLOGY | Admitting: OBSTETRICS & GYNECOLOGY
Payer: MEDICAID

## 2019-04-03 ENCOUNTER — ROUTINE PRENATAL (OUTPATIENT)
Dept: OBSTETRICS AND GYNECOLOGY | Facility: CLINIC | Age: 29
DRG: 833 | End: 2019-04-03
Payer: MEDICAID

## 2019-04-03 VITALS
DIASTOLIC BLOOD PRESSURE: 65 MMHG | WEIGHT: 163.38 LBS | BODY MASS INDEX: 28.04 KG/M2 | SYSTOLIC BLOOD PRESSURE: 109 MMHG

## 2019-04-03 DIAGNOSIS — O21.9 NAUSEA AND VOMITING DURING PREGNANCY PRIOR TO 22 WEEKS GESTATION: ICD-10-CM

## 2019-04-03 DIAGNOSIS — O21.0 HYPEREMESIS AFFECTING PREGNANCY, ANTEPARTUM: Primary | ICD-10-CM

## 2019-04-03 DIAGNOSIS — Z3A.10 10 WEEKS GESTATION OF PREGNANCY: ICD-10-CM

## 2019-04-03 DIAGNOSIS — O34.219 HISTORY OF CESAREAN DELIVERY AFFECTING PREGNANCY: ICD-10-CM

## 2019-04-03 DIAGNOSIS — O99.331 TOBACCO SMOKING AFFECTING PREGNANCY IN FIRST TRIMESTER: ICD-10-CM

## 2019-04-03 LAB
ALBUMIN SERPL BCP-MCNC: 3.3 G/DL (ref 3.5–5.2)
ALP SERPL-CCNC: 40 U/L (ref 55–135)
ALT SERPL W/O P-5'-P-CCNC: 15 U/L (ref 10–44)
AMYLASE SERPL-CCNC: 109 U/L (ref 20–110)
ANION GAP SERPL CALC-SCNC: 8 MMOL/L (ref 8–16)
AST SERPL-CCNC: 13 U/L (ref 10–40)
BILIRUB SERPL-MCNC: 0.3 MG/DL (ref 0.1–1)
BILIRUB UR QL STRIP: NEGATIVE
BUN SERPL-MCNC: 8 MG/DL (ref 6–20)
CALCIUM SERPL-MCNC: 9.1 MG/DL (ref 8.7–10.5)
CHLORIDE SERPL-SCNC: 102 MMOL/L (ref 95–110)
CLARITY UR: ABNORMAL
CO2 SERPL-SCNC: 25 MMOL/L (ref 23–29)
COLOR UR: YELLOW
CREAT SERPL-MCNC: 0.6 MG/DL (ref 0.5–1.4)
EST. GFR  (AFRICAN AMERICAN): >60 ML/MIN/1.73 M^2
EST. GFR  (NON AFRICAN AMERICAN): >60 ML/MIN/1.73 M^2
GLUCOSE SERPL-MCNC: 68 MG/DL (ref 70–110)
GLUCOSE UR QL STRIP: NEGATIVE
HGB UR QL STRIP: NEGATIVE
KETONES UR QL STRIP: NEGATIVE
LEUKOCYTE ESTERASE UR QL STRIP: NEGATIVE
LIPASE SERPL-CCNC: 12 U/L (ref 4–60)
NITRITE UR QL STRIP: NEGATIVE
PH UR STRIP: 6 [PH] (ref 5–8)
POTASSIUM SERPL-SCNC: 3.5 MMOL/L (ref 3.5–5.1)
PROT SERPL-MCNC: 6.7 G/DL (ref 6–8.4)
PROT UR QL STRIP: NEGATIVE
SODIUM SERPL-SCNC: 135 MMOL/L (ref 136–145)
SP GR UR STRIP: 1.02 (ref 1–1.03)
URN SPEC COLLECT METH UR: ABNORMAL
UROBILINOGEN UR STRIP-ACNC: NEGATIVE EU/DL

## 2019-04-03 PROCEDURE — 25000003 PHARM REV CODE 250: Performed by: OBSTETRICS & GYNECOLOGY

## 2019-04-03 PROCEDURE — 99999 PR PBB SHADOW E&M-EST. PATIENT-LVL II: ICD-10-PCS | Mod: PBBFAC,,, | Performed by: OBSTETRICS & GYNECOLOGY

## 2019-04-03 PROCEDURE — S0028 INJECTION, FAMOTIDINE, 20 MG: HCPCS | Performed by: OBSTETRICS & GYNECOLOGY

## 2019-04-03 PROCEDURE — 63600175 PHARM REV CODE 636 W HCPCS: Performed by: OBSTETRICS & GYNECOLOGY

## 2019-04-03 PROCEDURE — 99999 PR PBB SHADOW E&M-EST. PATIENT-LVL II: CPT | Mod: PBBFAC,,, | Performed by: OBSTETRICS & GYNECOLOGY

## 2019-04-03 PROCEDURE — 99214 OFFICE O/P EST MOD 30 MIN: CPT | Mod: S$PBB,,, | Performed by: OBSTETRICS & GYNECOLOGY

## 2019-04-03 PROCEDURE — 99214 PR OFFICE/OUTPT VISIT, EST, LEVL IV, 30-39 MIN: ICD-10-PCS | Mod: S$PBB,,, | Performed by: OBSTETRICS & GYNECOLOGY

## 2019-04-03 PROCEDURE — 36415 COLL VENOUS BLD VENIPUNCTURE: CPT

## 2019-04-03 PROCEDURE — 83690 ASSAY OF LIPASE: CPT

## 2019-04-03 PROCEDURE — 80053 COMPREHEN METABOLIC PANEL: CPT

## 2019-04-03 PROCEDURE — 11000001 HC ACUTE MED/SURG PRIVATE ROOM

## 2019-04-03 PROCEDURE — 82150 ASSAY OF AMYLASE: CPT

## 2019-04-03 PROCEDURE — 99212 OFFICE O/P EST SF 10 MIN: CPT | Mod: PBBFAC,TH | Performed by: OBSTETRICS & GYNECOLOGY

## 2019-04-03 PROCEDURE — 81003 URINALYSIS AUTO W/O SCOPE: CPT

## 2019-04-03 RX ORDER — DIPHENHYDRAMINE HCL 25 MG
25 CAPSULE ORAL EVERY 4 HOURS PRN
Status: DISCONTINUED | OUTPATIENT
Start: 2019-04-03 | End: 2019-04-05 | Stop reason: HOSPADM

## 2019-04-03 RX ORDER — DIPHENHYDRAMINE HYDROCHLORIDE 50 MG/ML
25 INJECTION INTRAMUSCULAR; INTRAVENOUS EVERY 4 HOURS PRN
Status: DISCONTINUED | OUTPATIENT
Start: 2019-04-03 | End: 2019-04-05 | Stop reason: HOSPADM

## 2019-04-03 RX ORDER — FAMOTIDINE 10 MG/ML
20 INJECTION INTRAVENOUS 2 TIMES DAILY
Status: DISCONTINUED | OUTPATIENT
Start: 2019-04-03 | End: 2019-04-05 | Stop reason: HOSPADM

## 2019-04-03 RX ORDER — SIMETHICONE 80 MG
1 TABLET,CHEWABLE ORAL EVERY 6 HOURS PRN
Status: DISCONTINUED | OUTPATIENT
Start: 2019-04-03 | End: 2019-04-05 | Stop reason: HOSPADM

## 2019-04-03 RX ORDER — DIPHENHYDRAMINE HYDROCHLORIDE 50 MG/ML
25 INJECTION INTRAMUSCULAR; INTRAVENOUS EVERY 4 HOURS PRN
Status: CANCELLED | OUTPATIENT
Start: 2019-04-03

## 2019-04-03 RX ORDER — ACETAMINOPHEN 325 MG/1
650 TABLET ORAL EVERY 6 HOURS PRN
Status: CANCELLED | OUTPATIENT
Start: 2019-04-03

## 2019-04-03 RX ORDER — PRENATAL WITH FERROUS FUM AND FOLIC ACID 3080; 920; 120; 400; 22; 1.84; 3; 20; 10; 1; 12; 200; 27; 25; 2 [IU]/1; [IU]/1; MG/1; [IU]/1; MG/1; MG/1; MG/1; MG/1; MG/1; MG/1; UG/1; MG/1; MG/1; MG/1; MG/1
1 TABLET ORAL DAILY
Status: DISCONTINUED | OUTPATIENT
Start: 2019-04-04 | End: 2019-04-05 | Stop reason: HOSPADM

## 2019-04-03 RX ORDER — FAMOTIDINE 10 MG/ML
20 INJECTION INTRAVENOUS 2 TIMES DAILY
Status: CANCELLED | OUTPATIENT
Start: 2019-04-03

## 2019-04-03 RX ORDER — DIPHENHYDRAMINE HCL 25 MG
25 CAPSULE ORAL EVERY 4 HOURS PRN
Status: CANCELLED | OUTPATIENT
Start: 2019-04-03

## 2019-04-03 RX ORDER — ACETAMINOPHEN 325 MG/1
650 TABLET ORAL EVERY 6 HOURS PRN
Status: DISCONTINUED | OUTPATIENT
Start: 2019-04-03 | End: 2019-04-05 | Stop reason: HOSPADM

## 2019-04-03 RX ORDER — DEXTROSE MONOHYDRATE, SODIUM CHLORIDE, SODIUM LACTATE, POTASSIUM CHLORIDE, CALCIUM CHLORIDE 5; 600; 310; 179; 20 G/100ML; MG/100ML; MG/100ML; MG/100ML; MG/100ML
INJECTION, SOLUTION INTRAVENOUS CONTINUOUS
Status: DISCONTINUED | OUTPATIENT
Start: 2019-04-03 | End: 2019-04-05 | Stop reason: HOSPADM

## 2019-04-03 RX ORDER — AMOXICILLIN 250 MG
1 CAPSULE ORAL NIGHTLY PRN
Status: DISCONTINUED | OUTPATIENT
Start: 2019-04-03 | End: 2019-04-05 | Stop reason: HOSPADM

## 2019-04-03 RX ORDER — AMOXICILLIN 250 MG
1 CAPSULE ORAL NIGHTLY PRN
Status: CANCELLED | OUTPATIENT
Start: 2019-04-03

## 2019-04-03 RX ORDER — METOCLOPRAMIDE HYDROCHLORIDE 5 MG/ML
10 INJECTION INTRAMUSCULAR; INTRAVENOUS EVERY 6 HOURS
Status: CANCELLED | OUTPATIENT
Start: 2019-04-03

## 2019-04-03 RX ORDER — METOCLOPRAMIDE HYDROCHLORIDE 5 MG/ML
10 INJECTION INTRAMUSCULAR; INTRAVENOUS EVERY 6 HOURS
Status: DISCONTINUED | OUTPATIENT
Start: 2019-04-03 | End: 2019-04-05 | Stop reason: HOSPADM

## 2019-04-03 RX ORDER — SIMETHICONE 80 MG
1 TABLET,CHEWABLE ORAL EVERY 6 HOURS PRN
Status: CANCELLED | OUTPATIENT
Start: 2019-04-03

## 2019-04-03 RX ORDER — DIPHENHYDRAMINE HCL 50 MG
50 CAPSULE ORAL EVERY 6 HOURS PRN
COMMUNITY
End: 2019-04-12

## 2019-04-03 RX ORDER — PYRIDOXINE HCL (VITAMIN B6) 25 MG
25 TABLET ORAL DAILY
Status: ON HOLD | COMMUNITY
End: 2019-10-24 | Stop reason: HOSPADM

## 2019-04-03 RX ORDER — PRENATAL WITH FERROUS FUM AND FOLIC ACID 3080; 920; 120; 400; 22; 1.84; 3; 20; 10; 1; 12; 200; 27; 25; 2 [IU]/1; [IU]/1; MG/1; [IU]/1; MG/1; MG/1; MG/1; MG/1; MG/1; MG/1; UG/1; MG/1; MG/1; MG/1; MG/1
1 TABLET ORAL DAILY
Status: CANCELLED | OUTPATIENT
Start: 2019-04-04

## 2019-04-03 RX ORDER — SODIUM CHLORIDE, SODIUM LACTATE, POTASSIUM CHLORIDE, CALCIUM CHLORIDE 600; 310; 30; 20 MG/100ML; MG/100ML; MG/100ML; MG/100ML
INJECTION, SOLUTION INTRAVENOUS CONTINUOUS
Status: DISCONTINUED | OUTPATIENT
Start: 2019-04-03 | End: 2019-04-03

## 2019-04-03 RX ADMIN — POTASSIUM CHLORIDE, SODIUM CHLORIDE, CALCIUM CHLORIDE, SODIUM LACTATE, AND DEXTROSE MONOHYDRATE: 1.79; 6; .2; 3.1; 5 INJECTION, SOLUTION INTRAVENOUS at 10:04

## 2019-04-03 RX ADMIN — FOLIC ACID: 5 INJECTION, SOLUTION INTRAMUSCULAR; INTRAVENOUS; SUBCUTANEOUS at 05:04

## 2019-04-03 RX ADMIN — METOCLOPRAMIDE 10 MG: 5 INJECTION, SOLUTION INTRAMUSCULAR; INTRAVENOUS at 06:04

## 2019-04-03 RX ADMIN — SODIUM CHLORIDE, SODIUM LACTATE, POTASSIUM CHLORIDE, AND CALCIUM CHLORIDE: .6; .31; .03; .02 INJECTION, SOLUTION INTRAVENOUS at 04:04

## 2019-04-03 RX ADMIN — FAMOTIDINE 20 MG: 10 INJECTION, SOLUTION INTRAVENOUS at 08:04

## 2019-04-03 NOTE — H&P (VIEW-ONLY)
Admission History and Physical to Labor and Delivery  Primary Ob-Gyn: Dr. Crum    Subjective:   Carolyn Pearson is a 28 y.o.  female @ 10w3d by LMP consistent with early ultrasound, who is admitted for hyperemesis gravidarum.    Pt with persistent n/v x 4 days. Not able to hold down anything, even water, everything would come up.  She vomits blood sometimes.  She took phenergan, zantac, vit B6/unisome with no relief.     Her current obstetrical history is significant for tobacco abuse, in remission.      Past Medical History:   Diagnosis Date    Hypertension     Miscarriage      Past Surgical History:   Procedure Laterality Date     SECTION      D & C (SUCTION) N/A 2017    [facility]    DELIVERY-CEASAREAN SECTION N/A 2014    [facility] at [facility] OR    FRACTURE SURGERY      left leg    LEG SURGERY       OB History    Para Term  AB Living   6 1 1 0 4 1   SAB TAB Ectopic Multiple Live Births   2 2 0 0 1      # Outcome Date GA Lbr Ok/2nd Weight Sex Delivery Anes PTL Lv   6 Current            5 SAB 2018           4 Term 14 39w2d  3.285 kg (7 lb 3.9 oz) F CS-LTranv EPI N DAMEON      Apgar1: 9  Apgar5: 9   3 SAB            2 TAB            1 TAB               Obstetric Comments   Gynhx: reg/5. Mod flow, mild cramp   Denies abnl pap, last pap  neg   H/o trichomonas     Prior OB complications: tobacco abuse - in remission  Family History   Problem Relation Age of Onset    Hypertension Mother     Hypertension Maternal Grandmother     Kidney disease Maternal Grandmother     Diabetes Maternal Grandmother     Diabetes Paternal Grandmother     Hypertension Paternal Grandmother     Kidney disease Paternal Grandmother     Breast cancer Neg Hx     Colon cancer Neg Hx     Ovarian cancer Neg Hx      Social History     Socioeconomic History    Marital status: Single     Spouse name: Not on file    Number of children:  Not on file    Years of education: Not on file    Highest education level: Not on file   Occupational History    Not on file   Social Needs    Financial resource strain: Not on file    Food insecurity:     Worry: Not on file     Inability: Not on file    Transportation needs:     Medical: Not on file     Non-medical: Not on file   Tobacco Use    Smoking status: Former Smoker     Packs/day: 0.50     Years: 6.00     Pack years: 3.00     Types: Cigarettes    Smokeless tobacco: Former User     Quit date: 8/31/2014    Tobacco comment: Pt smokes 4 cigarettes per day.   Substance and Sexual Activity    Alcohol use: No     Comment: sometimes    Drug use: No    Sexual activity: Yes     Partners: Male     Birth control/protection: None   Lifestyle    Physical activity:     Days per week: Not on file     Minutes per session: Not on file    Stress: Not on file   Relationships    Social connections:     Talks on phone: Not on file     Gets together: Not on file     Attends [employer] service: Not on file     Active member of club or organization: Not on file     Attends meetings of clubs or organizations: Not on file     Relationship status: Not on file   Other Topics Concern    Not on file   Social History Narrative    Not on file       Objective:     Vital signs in last 24 hours:  [unfilled]    General: alert, appears stated age and cooperative  Skin: normal  HEENT: trachea midline  Lungs: non-labored  Heart: and regular rate  Abdomen: soft, non-distended, gravid with non-tender fundus  Extremity: no edema, no evidence of DVT  FHT: 160 bpm          Lab Review     No visits with results within 1 Day(s) from this visit.   Latest known visit with results is:   Admission on 03/11/2019, Discharged on 03/11/2019   Component Date Value Ref Range Status    WBC 03/11/2019 4.49  3.90 - 12.70 K/uL Final    RBC 03/11/2019 4.43  4.00 - 5.40 M/uL Final    Hemoglobin 03/11/2019 13.0  12.0 - 16.0 g/dL Final    Hematocrit  03/11/2019 39.4  37.0 - 48.5 % Final    MCV 03/11/2019 89  82 - 98 fL Final    MCH 03/11/2019 29.3  27.0 - 31.0 pg Final    MCHC 03/11/2019 33.0  32.0 - 36.0 g/dL Final    RDW 03/11/2019 13.5  11.5 - 14.5 % Final    Platelets 03/11/2019 243  150 - 350 K/uL Final    MPV 03/11/2019 11.7  9.2 - 12.9 fL Final    Gran # (ANC) 03/11/2019 2.8  1.8 - 7.7 K/uL Final    Lymph # 03/11/2019 1.3  1.0 - 4.8 K/uL Final    Mono # 03/11/2019 0.4  0.3 - 1.0 K/uL Final    Eos # 03/11/2019 0.0  0.0 - 0.5 K/uL Final    Baso # 03/11/2019 0.01  0.00 - 0.20 K/uL Final    Gran% 03/11/2019 61.5  38.0 - 73.0 % Final    Lymph% 03/11/2019 28.5  18.0 - 48.0 % Final    Mono% 03/11/2019 8.9  4.0 - 15.0 % Final    Eosinophil% 03/11/2019 0.9  0.0 - 8.0 % Final    Basophil% 03/11/2019 0.2  0.0 - 1.9 % Final    Differential Method 03/11/2019 Automated   Final    Sodium 03/11/2019 134* 136 - 145 mmol/L Final    Potassium 03/11/2019 3.9  3.5 - 5.1 mmol/L Final    Chloride 03/11/2019 104  95 - 110 mmol/L Final    CO2 03/11/2019 25  23 - 29 mmol/L Final    Glucose 03/11/2019 92  70 - 110 mg/dL Final    BUN, Bld 03/11/2019 6  6 - 20 mg/dL Final    Creatinine 03/11/2019 0.7  0.5 - 1.4 mg/dL Final    Calcium 03/11/2019 9.6  8.7 - 10.5 mg/dL Final    Total Protein 03/11/2019 7.3  6.0 - 8.4 g/dL Final    Albumin 03/11/2019 3.9  3.5 - 5.2 g/dL Final    Total Bilirubin 03/11/2019 0.7  0.1 - 1.0 mg/dL Final    Comment: For infants and newborns, interpretation of results should be based  on gestational age, weight and in agreement with clinical  observations.  Premature Infant recommended reference ranges:  Up to 24 hours.............<8.0 mg/dL  Up to 48 hours............<12.0 mg/dL  3-5 days..................<15.0 mg/dL  6-29 days.................<15.0 mg/dL      Alkaline Phosphatase 03/11/2019 43* 55 - 135 U/L Final    AST 03/11/2019 17  10 - 40 U/L Final    ALT 03/11/2019 15  10 - 44 U/L Final    Anion Gap 03/11/2019 5* 8 - 16  mmol/L Final    eGFR if African American 03/11/2019 >60  >60 mL/min/1.73 m^2 Final    eGFR if non African American 03/11/2019 >60  >60 mL/min/1.73 m^2 Final    Comment: Calculation used to obtain the estimated glomerular filtration  rate (eGFR) is the CKD-EPI equation.       hCG Quant 03/11/2019 81281  See Text mIU/mL Final    Comment: Quantitative HCG Reference Ranges:  Males........................<5.0 mIU/mL  Non-Pregnant Females.........<5.0 mIU/mL  Pregnancy:  Weeks post-LMP...............Range (mIU/mL)  1-10  weeks.....................202-231,000  11-15 weeks..................22,536-234,990  16-22 weeks...................8,007-50,064  23-40 weeks...................1,600-49,413  NOTE:  This assay is not FDA approved for tumor screening,   diagnosis, or monitoring.      Specimen UA 03/11/2019 Urine, Clean Catch   Final    Color, UA 03/11/2019 Yellow  Yellow, Straw, Barby Final    Appearance, UA 03/11/2019 Hazy* Clear Final    pH, UA 03/11/2019 8.0  5.0 - 8.0 Final    Specific Gravity, UA 03/11/2019 1.020  1.005 - 1.030 Final    Protein, UA 03/11/2019 Negative  Negative Final    Comment: Recommend a 24 hour urine protein or a urine   protein/creatinine ratio if globulin induced proteinuria is  clinically suspected.      Glucose, UA 03/11/2019 Negative  Negative Final    Ketones, UA 03/11/2019 Trace* Negative Final    Bilirubin (UA) 03/11/2019 Negative  Negative Final    Occult Blood UA 03/11/2019 Negative  Negative Final    Nitrite, UA 03/11/2019 Negative  Negative Final    Urobilinogen, UA 03/11/2019 Negative  <2.0 EU/dL Final    Leukocytes, UA 03/11/2019 Negative  Negative Final    Trichomonas 03/11/2019 None  None Final    Clue Cells, Wet Prep 03/11/2019 None  None Final    Budding Yeast 03/11/2019 None  None Final    Fungal Hyphae 03/11/2019 None  None Final    WBC - Vaginal Screen 03/11/2019 Few* None Final    Bacteria - Vaginal Screen 03/11/2019 Few* None Final    Wet Prep Source  03/11/2019 Vagina  None Final    Chlamydia, Amplified DNA 03/11/2019 Not Detected  Not Detected Final    N gonorrhoeae, amplified DNA 03/11/2019 Not Detected  Not Detected Final       Assessment/Plan:    10w3d  1.  Hyperemesis gravidarum:  Admit to APU for fluid hydration, scheduled phenergan/pepcid.  Diet as tolerated. Will check cmp, amylase, lipase       Risks, benefits, alternatives and possible complications have been discussed in detail with the patient. Pre-admission, admission, and post admission procedures and expectations were discussed in detail. All questions answered, all appropriate consents will be signed.    Yogesh Crum

## 2019-04-03 NOTE — H&P
Admission History and Physical to Labor and Delivery  Primary Ob-Gyn: Dr. Crum    Subjective:   Carolyn Pearson is a 28 y.o.  female @ 10w3d by LMP consistent with early ultrasound, who is admitted for hyperemesis gravidarum.    Pt with persistent n/v x 4 days. Not able to hold down anything, even water, everything would come up.  She vomits blood sometimes.  She took phenergan, zantac, vit B6/unisome with no relief.     Her current obstetrical history is significant for tobacco abuse, in remission.      Past Medical History:   Diagnosis Date    Hypertension     Miscarriage      Past Surgical History:   Procedure Laterality Date     SECTION      D & C (SUCTION) N/A 2017    Performed by Levy Mcnamara MD at [facility] OR    DELIVERY-CEASAREAN SECTION N/A 2014    Performed by Chance Muniz MD at [facility] L&D OR    FRACTURE SURGERY      left leg    LEG SURGERY       OB History    Para Term  AB Living   6 1 1 0 4 1   SAB TAB Ectopic Multiple Live Births   2 2 0 0 1      # Outcome Date GA Lbr Ok/2nd Weight Sex Delivery Anes PTL Lv   6 Current            5 SAB 2018           4 Term 14 39w2d  3.285 kg (7 lb 3.9 oz) F CS-LTranv EPI N DAMEON      Apgar1: 9  Apgar5: 9   3 SAB            2 TAB            1 TAB               Obstetric Comments   Gynhx: reg/5. Mod flow, mild cramp   Denies abnl pap, last pap  neg   H/o trichomonas     Prior OB complications: tobacco abuse - in remission  Family History   Problem Relation Age of Onset    Hypertension Mother     Hypertension Maternal Grandmother     Kidney disease Maternal Grandmother     Diabetes Maternal Grandmother     Diabetes Paternal Grandmother     Hypertension Paternal Grandmother     Kidney disease Paternal Grandmother     Breast cancer Neg Hx     Colon cancer Neg Hx     Ovarian cancer Neg Hx      Social History     Socioeconomic History    Marital status: Single     Spouse name: Not on file    Number of children:  Not on file    Years of education: Not on file    Highest education level: Not on file   Occupational History    Not on file   Social Needs    Financial resource strain: Not on file    Food insecurity:     Worry: Not on file     Inability: Not on file    Transportation needs:     Medical: Not on file     Non-medical: Not on file   Tobacco Use    Smoking status: Former Smoker     Packs/day: 0.50     Years: 6.00     Pack years: 3.00     Types: Cigarettes    Smokeless tobacco: Former User     Quit date: 8/31/2014    Tobacco comment: Pt smokes 4 cigarettes per day.   Substance and Sexual Activity    Alcohol use: No     Comment: sometimes    Drug use: No    Sexual activity: Yes     Partners: Male     Birth control/protection: None   Lifestyle    Physical activity:     Days per week: Not on file     Minutes per session: Not on file    Stress: Not on file   Relationships    Social connections:     Talks on phone: Not on file     Gets together: Not on file     Attends [employer] service: Not on file     Active member of club or organization: Not on file     Attends meetings of clubs or organizations: Not on file     Relationship status: Not on file   Other Topics Concern    Not on file   Social History Narrative    Not on file       Objective:     Vital signs in last 24 hours:  [unfilled]    General: alert, appears stated age and cooperative  Skin: normal  HEENT: trachea midline  Lungs: non-labored  Heart: and regular rate  Abdomen: soft, non-distended, gravid with non-tender fundus  Extremity: no edema, no evidence of DVT  FHT: 160 bpm          Lab Review     No visits with results within 1 Day(s) from this visit.   Latest known visit with results is:   Admission on 03/11/2019, Discharged on 03/11/2019   Component Date Value Ref Range Status    WBC 03/11/2019 4.49  3.90 - 12.70 K/uL Final    RBC 03/11/2019 4.43  4.00 - 5.40 M/uL Final    Hemoglobin 03/11/2019 13.0  12.0 - 16.0 g/dL Final    Hematocrit  03/11/2019 39.4  37.0 - 48.5 % Final    MCV 03/11/2019 89  82 - 98 fL Final    MCH 03/11/2019 29.3  27.0 - 31.0 pg Final    MCHC 03/11/2019 33.0  32.0 - 36.0 g/dL Final    RDW 03/11/2019 13.5  11.5 - 14.5 % Final    Platelets 03/11/2019 243  150 - 350 K/uL Final    MPV 03/11/2019 11.7  9.2 - 12.9 fL Final    Gran # (ANC) 03/11/2019 2.8  1.8 - 7.7 K/uL Final    Lymph # 03/11/2019 1.3  1.0 - 4.8 K/uL Final    Mono # 03/11/2019 0.4  0.3 - 1.0 K/uL Final    Eos # 03/11/2019 0.0  0.0 - 0.5 K/uL Final    Baso # 03/11/2019 0.01  0.00 - 0.20 K/uL Final    Gran% 03/11/2019 61.5  38.0 - 73.0 % Final    Lymph% 03/11/2019 28.5  18.0 - 48.0 % Final    Mono% 03/11/2019 8.9  4.0 - 15.0 % Final    Eosinophil% 03/11/2019 0.9  0.0 - 8.0 % Final    Basophil% 03/11/2019 0.2  0.0 - 1.9 % Final    Differential Method 03/11/2019 Automated   Final    Sodium 03/11/2019 134* 136 - 145 mmol/L Final    Potassium 03/11/2019 3.9  3.5 - 5.1 mmol/L Final    Chloride 03/11/2019 104  95 - 110 mmol/L Final    CO2 03/11/2019 25  23 - 29 mmol/L Final    Glucose 03/11/2019 92  70 - 110 mg/dL Final    BUN, Bld 03/11/2019 6  6 - 20 mg/dL Final    Creatinine 03/11/2019 0.7  0.5 - 1.4 mg/dL Final    Calcium 03/11/2019 9.6  8.7 - 10.5 mg/dL Final    Total Protein 03/11/2019 7.3  6.0 - 8.4 g/dL Final    Albumin 03/11/2019 3.9  3.5 - 5.2 g/dL Final    Total Bilirubin 03/11/2019 0.7  0.1 - 1.0 mg/dL Final    Comment: For infants and newborns, interpretation of results should be based  on gestational age, weight and in agreement with clinical  observations.  Premature Infant recommended reference ranges:  Up to 24 hours.............<8.0 mg/dL  Up to 48 hours............<12.0 mg/dL  3-5 days..................<15.0 mg/dL  6-29 days.................<15.0 mg/dL      Alkaline Phosphatase 03/11/2019 43* 55 - 135 U/L Final    AST 03/11/2019 17  10 - 40 U/L Final    ALT 03/11/2019 15  10 - 44 U/L Final    Anion Gap 03/11/2019 5* 8 - 16  mmol/L Final    eGFR if African American 03/11/2019 >60  >60 mL/min/1.73 m^2 Final    eGFR if non African American 03/11/2019 >60  >60 mL/min/1.73 m^2 Final    Comment: Calculation used to obtain the estimated glomerular filtration  rate (eGFR) is the CKD-EPI equation.       hCG Quant 03/11/2019 55886  See Text mIU/mL Final    Comment: Quantitative HCG Reference Ranges:  Males........................<5.0 mIU/mL  Non-Pregnant Females.........<5.0 mIU/mL  Pregnancy:  Weeks post-LMP...............Range (mIU/mL)  1-10  weeks.....................202-231,000  11-15 weeks..................22,536-234,990  16-22 weeks...................8,007-50,064  23-40 weeks...................1,600-49,413  NOTE:  This assay is not FDA approved for tumor screening,   diagnosis, or monitoring.      Specimen UA 03/11/2019 Urine, Clean Catch   Final    Color, UA 03/11/2019 Yellow  Yellow, Straw, Barby Final    Appearance, UA 03/11/2019 Hazy* Clear Final    pH, UA 03/11/2019 8.0  5.0 - 8.0 Final    Specific Gravity, UA 03/11/2019 1.020  1.005 - 1.030 Final    Protein, UA 03/11/2019 Negative  Negative Final    Comment: Recommend a 24 hour urine protein or a urine   protein/creatinine ratio if globulin induced proteinuria is  clinically suspected.      Glucose, UA 03/11/2019 Negative  Negative Final    Ketones, UA 03/11/2019 Trace* Negative Final    Bilirubin (UA) 03/11/2019 Negative  Negative Final    Occult Blood UA 03/11/2019 Negative  Negative Final    Nitrite, UA 03/11/2019 Negative  Negative Final    Urobilinogen, UA 03/11/2019 Negative  <2.0 EU/dL Final    Leukocytes, UA 03/11/2019 Negative  Negative Final    Trichomonas 03/11/2019 None  None Final    Clue Cells, Wet Prep 03/11/2019 None  None Final    Budding Yeast 03/11/2019 None  None Final    Fungal Hyphae 03/11/2019 None  None Final    WBC - Vaginal Screen 03/11/2019 Few* None Final    Bacteria - Vaginal Screen 03/11/2019 Few* None Final    Wet Prep Source  03/11/2019 Vagina  None Final    Chlamydia, Amplified DNA 03/11/2019 Not Detected  Not Detected Final    N gonorrhoeae, amplified DNA 03/11/2019 Not Detected  Not Detected Final       Assessment/Plan:    10w3d  1.  Hyperemesis gravidarum:  Admit to APU for fluid hydration, scheduled phenergan/pepcid.  Diet as tolerated. Will check cmp, amylase, lipase       Risks, benefits, alternatives and possible complications have been discussed in detail with the patient. Pre-admission, admission, and post admission procedures and expectations were discussed in detail. All questions answered, all appropriate consents will be signed.    Yogesh Crum

## 2019-04-03 NOTE — PROGRESS NOTES
Here for routine OB visit  Denies bleeding/cramping  Denies bleeding.  Reported throwing up everything even water, cant hold anything down since 3 days ago, tried phenergan, zantac, vit b6 - not able to hold it down, tried milk and bland diet  Denies PreE symptoms/UTI symptoms  ROS: Negative except HPI    General:  NAD, AxO x 3  Abdomen: soft, gravid, non-tender  Ext: peripheral pulses normal, no pedal edema, no clubbing or cyanosis  Skin: normal, good color, good turgor and no lesions  Ua; with ketones  Hyperemesis:  Will send pt to hospital

## 2019-04-03 NOTE — PROGRESS NOTES
Pt presents from Dr. Crum's office with diagnosis of hyperemesis.  Pt states the last time she vomited was today at 2pm.  Pt states she does smoke marijuana and has smoked this week.  She states that her abdomen is tender to touch from her bending over to vomit.  Pt's  Young daughter at bedside.  Somebody is coming to pick her up per pt.

## 2019-04-04 PROCEDURE — 25000003 PHARM REV CODE 250: Performed by: OBSTETRICS & GYNECOLOGY

## 2019-04-04 PROCEDURE — 11000001 HC ACUTE MED/SURG PRIVATE ROOM

## 2019-04-04 PROCEDURE — S0028 INJECTION, FAMOTIDINE, 20 MG: HCPCS | Performed by: OBSTETRICS & GYNECOLOGY

## 2019-04-04 PROCEDURE — 63600175 PHARM REV CODE 636 W HCPCS: Performed by: OBSTETRICS & GYNECOLOGY

## 2019-04-04 RX ADMIN — Medication 1 TABLET: at 08:04

## 2019-04-04 RX ADMIN — POTASSIUM CHLORIDE, SODIUM CHLORIDE, CALCIUM CHLORIDE, SODIUM LACTATE, AND DEXTROSE MONOHYDRATE: 1.79; 6; .2; 3.1; 5 INJECTION, SOLUTION INTRAVENOUS at 01:04

## 2019-04-04 RX ADMIN — PROMETHAZINE HYDROCHLORIDE 25 MG: 25 INJECTION INTRAMUSCULAR; INTRAVENOUS at 11:04

## 2019-04-04 RX ADMIN — FAMOTIDINE 20 MG: 10 INJECTION, SOLUTION INTRAVENOUS at 08:04

## 2019-04-04 RX ADMIN — PROMETHAZINE HYDROCHLORIDE 25 MG: 25 INJECTION INTRAMUSCULAR; INTRAVENOUS at 05:04

## 2019-04-04 RX ADMIN — METOCLOPRAMIDE 10 MG: 5 INJECTION, SOLUTION INTRAMUSCULAR; INTRAVENOUS at 05:04

## 2019-04-04 RX ADMIN — METOCLOPRAMIDE 10 MG: 5 INJECTION, SOLUTION INTRAMUSCULAR; INTRAVENOUS at 12:04

## 2019-04-04 RX ADMIN — POTASSIUM CHLORIDE, SODIUM CHLORIDE, CALCIUM CHLORIDE, SODIUM LACTATE, AND DEXTROSE MONOHYDRATE: 1.79; 6; .2; 3.1; 5 INJECTION, SOLUTION INTRAVENOUS at 11:04

## 2019-04-04 RX ADMIN — POTASSIUM CHLORIDE, SODIUM CHLORIDE, CALCIUM CHLORIDE, SODIUM LACTATE, AND DEXTROSE MONOHYDRATE: 1.79; 6; .2; 3.1; 5 INJECTION, SOLUTION INTRAVENOUS at 05:04

## 2019-04-04 RX ADMIN — PROMETHAZINE HYDROCHLORIDE 25 MG: 25 INJECTION INTRAMUSCULAR; INTRAVENOUS at 12:04

## 2019-04-04 RX ADMIN — FOLIC ACID: 5 INJECTION, SOLUTION INTRAMUSCULAR; INTRAVENOUS; SUBCUTANEOUS at 08:04

## 2019-04-04 RX ADMIN — METOCLOPRAMIDE 10 MG: 5 INJECTION, SOLUTION INTRAMUSCULAR; INTRAVENOUS at 11:04

## 2019-04-04 NOTE — PLAN OF CARE
Problem: Adult Inpatient Plan of Care  Goal: Plan of Care Review  Pt progressing well. NAD noted .VSS. Pt had emesis x 1 last night. Unable to tolerate her liquid diet at this time. Nausea semi relieved with Pepcid, Reglan, and Phenergan. POC discussed with pt. Understanding verbalized.

## 2019-04-04 NOTE — PLAN OF CARE
Problem:  Fall Injury Risk  Goal: Absence of Fall, Infant Drop and Related Injury  Outcome: Ongoing (interventions implemented as appropriate)  Pt has remained free of fall or injury, no complaints voiced at this time.

## 2019-04-05 ENCOUNTER — TELEPHONE (OUTPATIENT)
Dept: OBSTETRICS AND GYNECOLOGY | Facility: CLINIC | Age: 29
End: 2019-04-05

## 2019-04-05 VITALS
TEMPERATURE: 98 F | BODY MASS INDEX: 26.46 KG/M2 | DIASTOLIC BLOOD PRESSURE: 66 MMHG | SYSTOLIC BLOOD PRESSURE: 118 MMHG | WEIGHT: 155 LBS | RESPIRATION RATE: 19 BRPM | OXYGEN SATURATION: 100 % | HEART RATE: 90 BPM | HEIGHT: 64 IN

## 2019-04-05 PROCEDURE — 25000003 PHARM REV CODE 250: Performed by: OBSTETRICS & GYNECOLOGY

## 2019-04-05 PROCEDURE — 99238 PR HOSPITAL DISCHARGE DAY,<30 MIN: ICD-10-PCS | Mod: ,,, | Performed by: OBSTETRICS & GYNECOLOGY

## 2019-04-05 PROCEDURE — 63600175 PHARM REV CODE 636 W HCPCS: Performed by: OBSTETRICS & GYNECOLOGY

## 2019-04-05 PROCEDURE — S0028 INJECTION, FAMOTIDINE, 20 MG: HCPCS | Performed by: OBSTETRICS & GYNECOLOGY

## 2019-04-05 PROCEDURE — 99238 HOSP IP/OBS DSCHRG MGMT 30/<: CPT | Mod: ,,, | Performed by: OBSTETRICS & GYNECOLOGY

## 2019-04-05 RX ORDER — ONDANSETRON 8 MG/1
8 TABLET, ORALLY DISINTEGRATING ORAL EVERY 8 HOURS PRN
Qty: 30 TABLET | Refills: 1 | Status: SHIPPED | OUTPATIENT
Start: 2019-04-05 | End: 2019-06-24 | Stop reason: SDUPTHER

## 2019-04-05 RX ADMIN — PROMETHAZINE HYDROCHLORIDE 25 MG: 25 INJECTION INTRAMUSCULAR; INTRAVENOUS at 05:04

## 2019-04-05 RX ADMIN — Medication 1 TABLET: at 09:04

## 2019-04-05 RX ADMIN — METOCLOPRAMIDE 10 MG: 5 INJECTION, SOLUTION INTRAMUSCULAR; INTRAVENOUS at 05:04

## 2019-04-05 RX ADMIN — FAMOTIDINE 20 MG: 10 INJECTION, SOLUTION INTRAVENOUS at 09:04

## 2019-04-05 RX ADMIN — FOLIC ACID: 5 INJECTION, SOLUTION INTRAMUSCULAR; INTRAVENOUS; SUBCUTANEOUS at 09:04

## 2019-04-05 NOTE — PLAN OF CARE
Problem: Adult Inpatient Plan of Care  Goal: Plan of Care Review  Pt voiding urine and passing flatus. No nausea/vomiting during shift. Consumed kris crackers, saltine crackers, and soup with no report of nausea. Currently receiving IV fluids as per order. Encouraged ambulation and hydration. Tolerating PO fluids. VSS. No acute distress noted. Denies pain and vaginal bleed.

## 2019-04-05 NOTE — DISCHARGE SUMMARY
[facility]  Obstetrics  Discharge Summary      Patient Name: Carolyn Pearson  MRN: [id number]  Admission Date: 4/3/2019  Hospital Length of Stay: 2 days  Discharge Date and Time:  2019 2:36 PM  Attending Physician: Yogesh Palafox Mai, MD   Discharging Provider: Darren Perez MD  Primary Care Provider: Yogesh Crum MD    HPI: 28 yro  @ 10 wk 3 d admitted for hyperemesis, no relief with phenergan.    * No surgery found *     Hospital Course:   Pt given IV hydration and antiemetics.  By HD#3, pt felt much improved and requesting to go home.  Pt has tolerated regular diet.        Final Active Diagnoses:    Diagnosis Date Noted POA    PRINCIPAL PROBLEM:  Hyperemesis affecting pregnancy, antepartum [O21.0] 2019 Yes      Problems Resolved During this Admission:        Labs:   CMP   Recent Labs   Lab 19  1825   *   K 3.5      CO2 25   GLU 68*   BUN 8   CREATININE 0.6   CALCIUM 9.1   PROT 6.7   ALBUMIN 3.3*   BILITOT 0.3   ALKPHOS 40*   AST 13   ALT 15   ANIONGAP 8   ESTGFRAFRICA >60   EGFRNONAA >60    and CBC No results for input(s): WBC, HGB, HCT, PLT in the last 48 hours.      Immunizations     Date Immunization Status Dose Route/Site Given by    19 1734 Influenza - Quadrivalent - PF Incomplete 0.5 mL Intramuscular/Left deltoid           This patient has no babies on file.  Pending Diagnostic Studies:     None          Discharged Condition: good    Disposition:     Follow Up:  Follow-up Information     Darren Perez MD In 2 weeks.    Specialties:  Obstetrics, Obstetrics and Gynecology  Why:  ob follow-up  Contact information:  [address]  [address]  [facility] [address] [phone]                 Patient Instructions:      Diet Adult Regular     Lifting restrictions     Notify your health care provider if you experience any of the following:  temperature >100.4     Notify your health care provider if you experience any of the following:  persistent nausea  and vomiting or diarrhea     Notify your health care provider if you experience any of the following:  severe uncontrolled pain     Notify your health care provider if you experience any of the following:  redness, tenderness, or signs of infection (pain, swelling, redness, odor or green/yellow discharge around incision site)     Notify your health care provider if you experience any of the following:  difficulty breathing or increased cough     Notify your health care provider if you experience any of the following:  severe persistent headache     Notify your health care provider if you experience any of the following:  persistent dizziness, light-headedness, or visual disturbances     Notify your health care provider if you experience any of the following:  increased confusion or weakness     Weight bearing restrictions (specify):     Medications:  Current Discharge Medication List      START taking these medications    Details   ondansetron (ZOFRAN-ODT) 8 MG TbDL Take 1 tablet (8 mg total) by mouth every 8 (eight) hours as needed.  Qty: 30 tablet, Refills: 1         CONTINUE these medications which have NOT CHANGED    Details   diphenhydrAMINE (UNISOM SLEEPGELS) 50 MG capsule Take 50 mg by mouth every 6 (six) hours as needed for Itching.      prenatal 38-iron-folate 6-dha (PRENATE DHA) 28 mg iron-1 mg -300 mg Cap Take 1 tablet by mouth once daily.  Qty: 30 capsule, Refills: 11      promethazine (PHENERGAN) 25 MG tablet Take 1/2-1 pill po q 8 hr prn nausea/emesis  Qty: 60 tablet, Refills: 1    Associated Diagnoses: Nausea and vomiting in pregnancy      pyridoxine, vitamin B6, (VITAMIN B-6) 25 MG Tab Take 25 mg by mouth once daily.      ranitidine (ZANTAC) 75 MG tablet Take 75 mg by mouth 2 (two) times daily.      metoclopramide HCl (REGLAN) 5 MG tablet Take 1 tablet (5 mg total) by mouth 3 (three) times daily before meals.  Qty: 90 tablet, Refills: 2      promethazine (PHENERGAN) 25 MG suppository Place 1  suppository (25 mg total) rectally every 6 (six) hours as needed for Nausea.  Qty: 30 suppository, Refills: 1             Darren Perez MD  Obstetrics  [facility]

## 2019-04-05 NOTE — HOSPITAL COURSE
Pt given IV hydration and antiemetics.  By HD#3, pt felt much improved and requesting to go home.  Pt has tolerated regular diet.

## 2019-04-05 NOTE — SUBJECTIVE & OBJECTIVE
Obstetric HPI:  Patient reports None contractions, absent vaginal bleeding , absent loss of fluid      Objective:     Vital Signs (Most Recent):  Temp: 98.3 °F (36.8 °C) (04/05/19 1154)  Pulse: 90 (04/05/19 1154)  Resp: 19 (04/05/19 1154)  BP: 118/66 (04/05/19 1154)  SpO2: 100 % (04/05/19 1154) Vital Signs (24h Range):  Temp:  [97.8 °F (36.6 °C)-98.4 °F (36.9 °C)] 98.3 °F (36.8 °C)  Pulse:  [79-96] 90  Resp:  [16-20] 19  SpO2:  [97 %-100 %] 100 %  BP: (107-124)/(56-71) 118/66     Weight: 70.3 kg (155 lb)  Body mass index is 26.61 kg/m².        Intake/Output Summary (Last 24 hours) at 4/5/2019 1431  Last data filed at 4/5/2019 1213  Gross per 24 hour   Intake 860 ml   Output 800 ml   Net 60 ml       Significant Labs:  Recent Lab Results     None          Physical Exam:   Constitutional: She is oriented to person, place, and time. She appears well-developed and well-nourished. No distress.    HENT:   Head: Normocephalic and atraumatic.     Neck: Normal range of motion.    Cardiovascular: Normal rate.     Pulmonary/Chest: Effort normal. No respiratory distress.        Abdominal: Soft. She exhibits no distension and no mass. There is no rebound and no guarding.             Musculoskeletal: Normal range of motion and moves all extremeties.       Neurological: She is alert and oriented to person, place, and time. No cranial nerve deficit. Coordination normal.    Skin: She is not diaphoretic.    Psychiatric: She has a normal mood and affect. Her behavior is normal. Judgment and thought content normal.

## 2019-04-05 NOTE — NURSING
D/C instructions with meds list and follow up appointment read to and copy given to pt, verbalized an understanding and intent to comply, refused pt escort ambulated off unit to personal vehicle independently without difficulty, no complaints voiced at this time.

## 2019-04-05 NOTE — PROGRESS NOTES
[facility]  Obstetrics  Antepartum Progress Note    Patient Name: Carolyn Pearson  MRN: [id number]  Admission Date: 4/3/2019  Hospital Length of Stay: 2 days  Attending Physician: Yogesh Palafox Mai, MD  Primary Care Provider: Yogesh Crum MD    Subjective:     Principal Problem:<principal problem not specified>    HPI:  28 yro  @ 10 wk 3 d admitted for hyperemesis, no relief with phenergan.    Hospital Course:  Pt given IV hydration and antiemetics.  By HD#3, pt felt much improved and requesting to go home.  Pt has tolerated regular diet.    Obstetric HPI:  Patient reports None contractions, absent vaginal bleeding , absent loss of fluid      Objective:     Vital Signs (Most Recent):  Temp: 98.3 °F (36.8 °C) (19 1154)  Pulse: 90 (19 1154)  Resp: 19 (19 1154)  BP: 118/66 (19 1154)  SpO2: 100 % (19 1154) Vital Signs (24h Range):  Temp:  [97.8 °F (36.6 °C)-98.4 °F (36.9 °C)] 98.3 °F (36.8 °C)  Pulse:  [79-96] 90  Resp:  [16-20] 19  SpO2:  [97 %-100 %] 100 %  BP: (107-124)/(56-71) 118/66     Weight: 70.3 kg (155 lb)  Body mass index is 26.61 kg/m².        Intake/Output Summary (Last 24 hours) at 2019 1431  Last data filed at 2019 1213  Gross per 24 hour   Intake 860 ml   Output 800 ml   Net 60 ml       Significant Labs:  Recent Lab Results     None          Physical Exam:   Constitutional: She is oriented to person, place, and time. She appears well-developed and well-nourished. No distress.    HENT:   Head: Normocephalic and atraumatic.     Neck: Normal range of motion.    Cardiovascular: Normal rate.     Pulmonary/Chest: Effort normal. No respiratory distress.        Abdominal: Soft. She exhibits no distension and no mass. There is no rebound and no guarding.             Musculoskeletal: Normal range of motion and moves all extremeties.       Neurological: She is alert and oriented to person, place, and time. No cranial nerve deficit. Coordination normal.     Skin: She is not diaphoretic.    Psychiatric: She has a normal mood and affect. Her behavior is normal. Judgment and thought content normal.       Assessment/Plan:     28 y.o. female  at 10w5d for:    Hyperemesis affecting pregnancy, antepartum  Dismiss today          Darren Perez MD  Obstetrics  [facility]

## 2019-04-05 NOTE — TELEPHONE ENCOUNTER
Spoke with pt she wants to be discharged said she is feeling much better. Told her on call Dr will be rounding since  is out of town.         ----- Message from Sonya Marie sent at 4/5/2019  9:51 AM CDT -----  Contact: Self   Type:  Needs Medical Advice    Who Called:Self     Symptoms (please be specific): She wants to tell the office herself     How long has patient had these symptoms:  She wants to speak with the office herself    Pharmacy name and phone #:  [email or web address] [id number] [address] [phone] (Phone)  [phone] (Fax)            Would the patient rather a call back or a response via [facility]? Call     Best Call Back Number: [phone]    Additional Information:

## 2019-04-08 ENCOUNTER — TELEPHONE (OUTPATIENT)
Dept: OBSTETRICS AND GYNECOLOGY | Facility: CLINIC | Age: 29
End: 2019-04-08

## 2019-04-08 NOTE — TELEPHONE ENCOUNTER
4/8/19 @ 0940  SPOKE WITH MS KIRAN, INFORMED HER THAT DR ROMO IS OUT OF TOWN, AND THAT ZANTAC IS AN OVER THE COUNTER MEDICATION THAT SHE CAN PURCHASE , ALSO TOLD HER THAT SHE CAN USE VITAMING B-6 25 MG & UNISOM 1/2 TAB , BUT HUNG UP BEFORE I WAS ABLE TO TELL HER HOW TO USE THE COMBINATION , ATTEMPTED TO CALL PT BACK ,BUT HER PHONE WAS NOT ACCEPTING CALL      ----- Message from Alesia Priest sent at 4/8/2019  9:27 AM CDT -----  Contact: Self  Patient called to request a new prescription for listed medication. Please call at [phone]        ranitidine (ZANTAC) 75 MG tablet            [facility] [address] [facility] [address] AT [address] [phone] (Phone)

## 2019-04-12 ENCOUNTER — HOSPITAL ENCOUNTER (EMERGENCY)
Facility: HOSPITAL | Age: 29
Discharge: HOME | End: 2019-04-13
Attending: EMERGENCY MEDICINE
Payer: MEDICAID

## 2019-04-12 DIAGNOSIS — K92.0 HEMATEMESIS WITH NAUSEA: Primary | ICD-10-CM

## 2019-04-12 LAB
AMORPH CRY URNS QL MICRO: NORMAL
B-HCG UR QL: POSITIVE
BILIRUB UR QL STRIP: NEGATIVE
CLARITY UR: ABNORMAL
COLOR UR: YELLOW
CTP QC/QA: YES
GLUCOSE UR QL STRIP: NEGATIVE
HGB UR QL STRIP: NEGATIVE
KETONES UR QL STRIP: NEGATIVE
LEUKOCYTE ESTERASE UR QL STRIP: NEGATIVE
MICROSCOPIC COMMENT: NORMAL
NITRITE UR QL STRIP: NEGATIVE
PH UR STRIP: 8 [PH] (ref 5–8)
PROT UR QL STRIP: NEGATIVE
SP GR UR STRIP: 1.02 (ref 1–1.03)
SQUAMOUS #/AREA URNS HPF: 20 /HPF
URN SPEC COLLECT METH UR: ABNORMAL
UROBILINOGEN UR STRIP-ACNC: NEGATIVE EU/DL

## 2019-04-12 PROCEDURE — 25000003 PHARM REV CODE 250: Performed by: PHYSICIAN ASSISTANT

## 2019-04-12 PROCEDURE — 81025 URINE PREGNANCY TEST: CPT | Performed by: PHYSICIAN ASSISTANT

## 2019-04-12 PROCEDURE — 99284 EMERGENCY DEPT VISIT MOD MDM: CPT | Mod: 25

## 2019-04-12 PROCEDURE — 81000 URINALYSIS NONAUTO W/SCOPE: CPT

## 2019-04-12 PROCEDURE — 96365 THER/PROPH/DIAG IV INF INIT: CPT

## 2019-04-12 PROCEDURE — 96375 TX/PRO/DX INJ NEW DRUG ADDON: CPT

## 2019-04-12 PROCEDURE — C9113 INJ PANTOPRAZOLE SODIUM, VIA: HCPCS | Performed by: PHYSICIAN ASSISTANT

## 2019-04-12 PROCEDURE — 63600175 PHARM REV CODE 636 W HCPCS: Performed by: PHYSICIAN ASSISTANT

## 2019-04-12 RX ORDER — ONDANSETRON 2 MG/ML
4 INJECTION INTRAMUSCULAR; INTRAVENOUS
Status: COMPLETED | OUTPATIENT
Start: 2019-04-13 | End: 2019-04-13

## 2019-04-12 RX ORDER — PANTOPRAZOLE SODIUM 40 MG/10ML
40 INJECTION, POWDER, LYOPHILIZED, FOR SOLUTION INTRAVENOUS
Status: COMPLETED | OUTPATIENT
Start: 2019-04-12 | End: 2019-04-12

## 2019-04-12 RX ADMIN — PANTOPRAZOLE SODIUM 40 MG: 40 INJECTION, POWDER, FOR SOLUTION INTRAVENOUS at 11:04

## 2019-04-12 RX ADMIN — PROMETHAZINE HYDROCHLORIDE 12.5 MG: 25 INJECTION INTRAMUSCULAR; INTRAVENOUS at 11:04

## 2019-04-12 RX ADMIN — SODIUM CHLORIDE 1000 ML: 0.9 INJECTION, SOLUTION INTRAVENOUS at 11:04

## 2019-04-13 VITALS
SYSTOLIC BLOOD PRESSURE: 104 MMHG | HEART RATE: 83 BPM | BODY MASS INDEX: 27.31 KG/M2 | DIASTOLIC BLOOD PRESSURE: 53 MMHG | HEIGHT: 64 IN | OXYGEN SATURATION: 100 % | RESPIRATION RATE: 18 BRPM | WEIGHT: 160 LBS | TEMPERATURE: 98 F

## 2019-04-13 LAB
ALBUMIN SERPL BCP-MCNC: 3.2 G/DL (ref 3.5–5.2)
ALP SERPL-CCNC: 40 U/L (ref 55–135)
ALT SERPL W/O P-5'-P-CCNC: 28 U/L (ref 10–44)
ANION GAP SERPL CALC-SCNC: 7 MMOL/L (ref 8–16)
AST SERPL-CCNC: 20 U/L (ref 10–40)
BASOPHILS # BLD AUTO: 0.01 K/UL (ref 0–0.2)
BASOPHILS NFR BLD: 0.1 % (ref 0–1.9)
BILIRUB SERPL-MCNC: 0.2 MG/DL (ref 0.1–1)
BUN SERPL-MCNC: 8 MG/DL (ref 6–20)
CALCIUM SERPL-MCNC: 8.7 MG/DL (ref 8.7–10.5)
CHLORIDE SERPL-SCNC: 106 MMOL/L (ref 95–110)
CO2 SERPL-SCNC: 24 MMOL/L (ref 23–29)
CREAT SERPL-MCNC: 0.6 MG/DL (ref 0.5–1.4)
DIFFERENTIAL METHOD: ABNORMAL
EOSINOPHIL # BLD AUTO: 0.1 K/UL (ref 0–0.5)
EOSINOPHIL NFR BLD: 1.7 % (ref 0–8)
ERYTHROCYTE [DISTWIDTH] IN BLOOD BY AUTOMATED COUNT: 13.9 % (ref 11.5–14.5)
EST. GFR  (AFRICAN AMERICAN): >60 ML/MIN/1.73 M^2
EST. GFR  (NON AFRICAN AMERICAN): >60 ML/MIN/1.73 M^2
GLUCOSE SERPL-MCNC: 103 MG/DL (ref 70–110)
HCT VFR BLD AUTO: 34.5 % (ref 37–48.5)
HGB BLD-MCNC: 11.3 G/DL (ref 12–16)
LYMPHOCYTES # BLD AUTO: 1.1 K/UL (ref 1–4.8)
LYMPHOCYTES NFR BLD: 15.1 % (ref 18–48)
MCH RBC QN AUTO: 29.3 PG (ref 27–31)
MCHC RBC AUTO-ENTMCNC: 32.8 G/DL (ref 32–36)
MCV RBC AUTO: 89 FL (ref 82–98)
MONOCYTES # BLD AUTO: 0.6 K/UL (ref 0.3–1)
MONOCYTES NFR BLD: 8.6 % (ref 4–15)
NEUTROPHILS # BLD AUTO: 5.4 K/UL (ref 1.8–7.7)
NEUTROPHILS NFR BLD: 74.5 % (ref 38–73)
PLATELET # BLD AUTO: 199 K/UL (ref 150–350)
PMV BLD AUTO: 11.8 FL (ref 9.2–12.9)
POTASSIUM SERPL-SCNC: 3.7 MMOL/L (ref 3.5–5.1)
PROT SERPL-MCNC: 6.4 G/DL (ref 6–8.4)
RBC # BLD AUTO: 3.86 M/UL (ref 4–5.4)
SODIUM SERPL-SCNC: 137 MMOL/L (ref 136–145)
WBC # BLD AUTO: 7.22 K/UL (ref 3.9–12.7)

## 2019-04-13 PROCEDURE — 80053 COMPREHEN METABOLIC PANEL: CPT

## 2019-04-13 PROCEDURE — 85025 COMPLETE CBC W/AUTO DIFF WBC: CPT

## 2019-04-13 PROCEDURE — 63600175 PHARM REV CODE 636 W HCPCS: Performed by: PHYSICIAN ASSISTANT

## 2019-04-13 RX ORDER — PROMETHAZINE HYDROCHLORIDE 25 MG/1
25 SUPPOSITORY RECTAL EVERY 6 HOURS PRN
Qty: 10 SUPPOSITORY | Refills: 0 | Status: ON HOLD | OUTPATIENT
Start: 2019-04-13 | End: 2019-10-24 | Stop reason: HOSPADM

## 2019-04-13 RX ADMIN — ONDANSETRON 4 MG: 2 INJECTION INTRAMUSCULAR; INTRAVENOUS at 12:04

## 2019-04-13 NOTE — ED TRIAGE NOTES
Pt arrived to the ED via POV from home with c/o vomiting. Pt reports having one vomiting episode of blood clots approx 30 minutes PTA. Pt reports abd pain and nausea but denies diarrhea/constipation.  Pt denies any other symptoms. Rate pain 10/10 on pain scale. Pt reports being 11 weeks and 4 days pregnant.

## 2019-04-13 NOTE — ED PROVIDER NOTES
Encounter Date: 2019       History     Chief Complaint   Patient presents with    Vomiting     pt complains of vomiting w/ dark red blood in vomit. pt states she is 11 weeks 4 days pregnant. denies any blood in stool. denies abd pain. states has sore throat after vomiting. 7/10     28-year-old female with history of hyperemesis gravidarum with vomiting blood this evening.  She is approximately 11 weeks gestation, , and is followed by OBGYN doctor Skyla.  She reports multiple episodes of vomiting despite taking Zofran.  At least 1 of the episodes included a lot of red blood and small clots.  She currently denies abdominal pain, dysuria, vaginal bleeding.  She has had no rectal bleeding or change in stool color.        Review of patient's allergies indicates:  No Known Allergies  Past Medical History:   Diagnosis Date    Hypertension     Miscarriage      Past Surgical History:   Procedure Laterality Date     SECTION      D & C (SUCTION) N/A 2017    Performed [facility] OR    DELIVERY-CEASAREAN SECTION N/A 2014    Performed by Chance Muniz MD at [facility] L[facility]D OR    FRACTURE SURGERY      left leg    LEG SURGERY       Family History   Problem Relation Age of Onset    Hypertension Mother     Hypertension Maternal Grandmother     Kidney disease Maternal Grandmother     Diabetes Maternal Grandmother     Diabetes Paternal Grandmother     Hypertension Paternal Grandmother     Kidney disease Paternal Grandmother     Breast cancer Neg Hx     Colon cancer Neg Hx     Ovarian cancer Neg Hx      Social History     Tobacco Use    Smoking status: Former Smoker     Packs/day: 0.50     Years: 6.00     Pack years: 3.00     Types: Cigarettes    Smokeless tobacco: Former User     Quit date: 2014    Tobacco comment: Pt smokes 4 cigarettes per day.   Substance Use Topics    Alcohol use: No     Comment: sometimes    Drug use: No     Review of Systems   Constitutional:  Negative for fever.   HENT: Negative for sore throat.    Respiratory: Negative for shortness of breath.    Cardiovascular: Negative for chest pain.   Gastrointestinal: Positive for nausea and vomiting. Negative for abdominal pain, blood in stool, constipation and diarrhea.   Genitourinary: Negative for dysuria.   Musculoskeletal: Negative for back pain.   Skin: Negative for rash.   Neurological: Negative for weakness.   Hematological: Does not bruise/bleed easily.       Physical Exam     Initial Vitals [04/12/19 2222]   BP Pulse Resp Temp SpO2   119/71 86 16 98.9 °F (37.2 °C) 99 %      MAP       --         Physical Exam    Vitals reviewed.  Constitutional: She appears well-developed and well-nourished. She is not diaphoretic. No distress.   HENT:   Head: Normocephalic and atraumatic.   Right Ear: External ear normal.   Left Ear: External ear normal.   Nose: Nose normal.   Eyes: Conjunctivae are normal. No scleral icterus.   Neck: Normal range of motion. Neck supple.   Cardiovascular: Normal rate, regular rhythm, normal heart sounds and intact distal pulses.   Pulmonary/Chest: Breath sounds normal. No respiratory distress. She has no wheezes. She has no rhonchi. She has no rales. She exhibits no tenderness.   Abdominal: Soft. Bowel sounds are normal. She exhibits no distension and no mass. There is no tenderness. There is no rebound and no guarding.   Musculoskeletal: Normal range of motion.   Neurological: She is alert and oriented to person, place, and time.   Skin: Skin is warm and dry.         ED Course   Procedures  Labs Reviewed   URINALYSIS - Abnormal; Notable for the following components:       Result Value    Appearance, UA Cloudy (*)     All other components within normal limits   CBC W/ AUTO DIFFERENTIAL - Abnormal; Notable for the following components:    RBC 3.86 (*)     Hemoglobin 11.3 (*)     Hematocrit 34.5 (*)     Gran% 74.5 (*)     Lymph% 15.1 (*)     All other components within normal limits    COMPREHENSIVE METABOLIC PANEL - Abnormal; Notable for the following components:    Albumin 3.2 (*)     Alkaline Phosphatase 40 (*)     Anion Gap 7 (*)     All other components within normal limits   POCT URINE PREGNANCY - Abnormal; Notable for the following components:    POC Preg Test, Ur Positive (*)     All other components within normal limits   URINALYSIS MICROSCOPIC          Imaging Results    None          Medical Decision Making:   ED Management:  28-year-old pregnant patient with hematemesis, most likely secondary to Suzy-Hdz tears.  She has no abdominal pain or chest pain. She had an episode of vomiting while in the emergency department that had a very small amount of blood, which the patient said it is much improved from the last emesis.  Her vital signs within normal limits. There is a mild drop and H&H from 1 month ago.  This could be physiologic due to pregnancy.  She has no symptoms of anemia.  Nausea vomiting resolved in the ED with Phenergan and Zofran.  Patient given IV fluids as well. She continues to feel well, only complaining of mild sore throat. Without chest or abdominal pain, I think esophageal rupture is very unlikely.  Will treat with antiemetics, and H2 blocker.  Patient will follow up as scheduled with OBGYN.  Will provide GI follow-up instructions.  Patient given return precautions.                      Clinical Impression:       ICD-10-CM ICD-9-CM   1. Hematemesis with nausea K92.0 578.0     787.02                                Epi Liang PA-C  04/13/19 0622

## 2019-04-29 ENCOUNTER — TELEPHONE (OUTPATIENT)
Dept: OBSTETRICS AND GYNECOLOGY | Facility: CLINIC | Age: 29
End: 2019-04-29

## 2019-04-29 RX ORDER — METRONIDAZOLE 500 MG/1
500 TABLET ORAL EVERY 12 HOURS
Qty: 14 TABLET | Refills: 0 | Status: SHIPPED | OUTPATIENT
Start: 2019-04-29 | End: 2019-05-06

## 2019-04-29 NOTE — TELEPHONE ENCOUNTER
Contacted patient re: returned mail.     Gave pt. Results- BV and told her we would resend medication if no better in 2 weeks RTC.

## 2019-05-01 ENCOUNTER — TELEPHONE (OUTPATIENT)
Dept: OBSTETRICS AND GYNECOLOGY | Facility: CLINIC | Age: 29
End: 2019-05-01

## 2019-05-01 NOTE — TELEPHONE ENCOUNTER
Pt. called said she had an appointment today that was   CX and didn't CX it when I got the call transferred to me no one was on the line I called pt back when I asked for ms davenport no responded and line hung up. Pt appointment was CX when we called to conform her appt. Pt said someone told her she didn't need to come in and she will R/S in 2 weeks like someone told her

## 2019-05-02 ENCOUNTER — TELEPHONE (OUTPATIENT)
Dept: OBSTETRICS AND GYNECOLOGY | Facility: CLINIC | Age: 29
End: 2019-05-02

## 2019-05-02 NOTE — TELEPHONE ENCOUNTER
5/2/19 @ 1201  .CALL ATTEMPT TO PT UNSUCCESSFUL, MESSAGE LEFT FOR PT TO RETURN CALL TO OFFICE CONCERNING HER REQUEST FOR AN APPT WITH DR ROMO. NO APPTS AVAILABLE UNTIL 5/8/19 ,, CAN PUT HER WITH DR HERNANDEZ TOMORROW 5/3/19          ----- Message from Alysia Hart sent at 5/2/2019 10:23 AM CDT -----  ..Type:  Sooner Appointment Request    Patient is requesting a sooner appointment.  Patient declined first available appointment listed as well as another facility and provider .  Patient will not accept being placed on the waitlist and is requesting a message be sent to doctor.    Name of Caller: Carolyn     When is the first available appointment? 05/06    Symptoms: Lower Abdominal pain and BV    Would the patient rather a call back or a response via [facility]? Call back     Best Call Back Number: [phone]    Additional Information: Pt asking for an appt today. She states her BV is getting worse.

## 2019-05-20 ENCOUNTER — LAB VISIT (OUTPATIENT)
Dept: LAB | Facility: HOSPITAL | Age: 29
End: 2019-05-20
Attending: OBSTETRICS & GYNECOLOGY
Payer: MEDICAID

## 2019-05-20 ENCOUNTER — ROUTINE PRENATAL (OUTPATIENT)
Dept: OBSTETRICS AND GYNECOLOGY | Facility: CLINIC | Age: 29
End: 2019-05-20
Payer: MEDICAID

## 2019-05-20 VITALS
DIASTOLIC BLOOD PRESSURE: 60 MMHG | SYSTOLIC BLOOD PRESSURE: 100 MMHG | BODY MASS INDEX: 29.46 KG/M2 | WEIGHT: 171.63 LBS

## 2019-05-20 DIAGNOSIS — Z3A.17 17 WEEKS GESTATION OF PREGNANCY: Primary | ICD-10-CM

## 2019-05-20 DIAGNOSIS — Z3A.17 17 WEEKS GESTATION OF PREGNANCY: ICD-10-CM

## 2019-05-20 PROCEDURE — 99213 PR OFFICE/OUTPT VISIT, EST, LEVL III, 20-29 MIN: ICD-10-PCS | Mod: TH,S$PBB,, | Performed by: OBSTETRICS & GYNECOLOGY

## 2019-05-20 PROCEDURE — 36415 COLL VENOUS BLD VENIPUNCTURE: CPT

## 2019-05-20 PROCEDURE — 99999 PR PBB SHADOW E&M-EST. PATIENT-LVL II: CPT | Mod: PBBFAC,,, | Performed by: OBSTETRICS & GYNECOLOGY

## 2019-05-20 PROCEDURE — 99999 PR PBB SHADOW E&M-EST. PATIENT-LVL II: ICD-10-PCS | Mod: PBBFAC,,, | Performed by: OBSTETRICS & GYNECOLOGY

## 2019-05-20 PROCEDURE — 99213 OFFICE O/P EST LOW 20 MIN: CPT | Mod: TH,S$PBB,, | Performed by: OBSTETRICS & GYNECOLOGY

## 2019-05-20 PROCEDURE — 99212 OFFICE O/P EST SF 10 MIN: CPT | Mod: PBBFAC | Performed by: OBSTETRICS & GYNECOLOGY

## 2019-05-20 PROCEDURE — 81511 FTL CGEN ABNOR FOUR ANAL: CPT

## 2019-05-20 NOTE — PROGRESS NOTES
"17w1d here for OB visit.    Pregnancy complicated by:   x 1, declined TOLAC  Smoking quit early in pregnancy    Last OB visit ~10 wks.  Pt states "been busy".  Importance of regular prenatal visit discussed.    Pt has no major complaints today.  Reports active fetus.  Denies vaginal bleeding, leakage of fluid, or contractions.    Pt treated for BV, yeast since her last visit, denies discharge today.  Declined pelvic exam/test of cure today due to young child present for visit.  Importance of exam discussed.    Order quad screen today.  Anatomy ultrasound next visit.    Encourage tobacco cessation.  Principles of prenatal care and precautions reviewed.  Return 4 wks.  Voiced understanding.    Andrew Barrera MD        "

## 2019-05-22 ENCOUNTER — TELEPHONE (OUTPATIENT)
Dept: OBSTETRICS AND GYNECOLOGY | Facility: CLINIC | Age: 29
End: 2019-05-22

## 2019-05-22 LAB
# FETUSES US: NORMAL
2ND TRIMESTER 4 SCREEN PNL SERPL: NEGATIVE
2ND TRIMESTER 4 SCREEN SERPL-IMP: NORMAL
AFP MOM SERPL: 1.49
AFP SERPL-MCNC: 61.2 NG/ML
AGE AT DELIVERY: 27
B-HCG MOM SERPL: 1.58
B-HCG SERPL-ACNC: 48.1 IU/ML
FET TS 21 RISK FROM MAT AGE: NORMAL
GA (DAYS): 6 D
GA (WEEKS): 16 WK
GA METHOD: NORMAL
IDDM PATIENT QL: NORMAL
INHIBIN A MOM SERPL: 1.49
INHIBIN A SERPL-MCNC: 222.6 PG/ML
SMOKING STATUS FTND: NO
TS 18 RISK FETUS: NORMAL
TS 21 RISK FETUS: NORMAL
U ESTRIOL MOM SERPL: 0.78
U ESTRIOL SERPL-MCNC: 0.76 NG/ML

## 2019-05-22 RX ORDER — ONDANSETRON 4 MG/1
TABLET, FILM COATED ORAL
Qty: 10 TABLET | Refills: 0 | Status: ON HOLD | OUTPATIENT
Start: 2019-05-22 | End: 2019-10-24 | Stop reason: HOSPADM

## 2019-05-23 ENCOUNTER — TELEPHONE (OUTPATIENT)
Dept: OBSTETRICS AND GYNECOLOGY | Facility: CLINIC | Age: 29
End: 2019-05-23

## 2019-05-23 RX ORDER — ONDANSETRON 8 MG/1
8 TABLET, ORALLY DISINTEGRATING ORAL EVERY 8 HOURS PRN
Qty: 30 TABLET | Refills: 1 | Status: CANCELLED | OUTPATIENT
Start: 2019-05-23

## 2019-05-23 NOTE — TELEPHONE ENCOUNTER
Per Dr. Soto, I called in verbal order for Zofran ODT 8mg I po q 8 hrs as needed for nausea. Dis #30.

## 2019-05-23 NOTE — TELEPHONE ENCOUNTER
"Patient came into phar requesting refill for Zofran ODT 8mg q 8 hrs as needed for nausea. Pt. Seemed depressed per .     Pt. Picked up Zofran 4mg tablets yesterday but states they don't work as well. Pt. Stated she has not been able to hold any food down.     I Had  tell patient I will check with Dr. Crum this morning after her surgery re: changing script. Per , pt. Stated "I can't do this, i'm going to get an " as she was leaving the office     Spoke with Dr. Soto who was on her way to the office re: ok to call in verbal orders for the 8mg Zofran. Dr. Soto suggested having the patient come in to see her today. Attempted to contact patient X2 but had to leave message.   "

## 2019-05-23 NOTE — TELEPHONE ENCOUNTER
"Attempted to call patient to discuss earlier visit to clinic when no physician was present. Patient was upset about her nausea. She left stating that she was going to an  clinic because she cannot take "this" anymore.     Medication was changed for patient. She has been called multiple times by nursing staff and provider. Messages left. No answer.   "

## 2019-05-28 ENCOUNTER — TELEPHONE (OUTPATIENT)
Dept: OBSTETRICS AND GYNECOLOGY | Facility: CLINIC | Age: 29
End: 2019-05-28

## 2019-05-28 NOTE — TELEPHONE ENCOUNTER
5/28/19  @ 0857   INCOMING CALL FROM [facility], PT IS REQUESTING A REFILL FOR HER ZOFRAN ,8MG SUBLINGUAL, INFORMED OF MEDICATION PRECAUTION OF POSSIBLE CLEFT LIP/PALATE IN THE FETUS, AND SHE MIGHT TRY VITAMIN B 6 AND UNISOM FIRST, PT STATED HER UNDERSTANDING

## 2019-06-04 ENCOUNTER — HOSPITAL ENCOUNTER (EMERGENCY)
Facility: HOSPITAL | Age: 29
Discharge: HOME | End: 2019-06-04
Attending: EMERGENCY MEDICINE
Payer: MEDICAID

## 2019-06-04 VITALS
WEIGHT: 160 LBS | DIASTOLIC BLOOD PRESSURE: 68 MMHG | SYSTOLIC BLOOD PRESSURE: 128 MMHG | OXYGEN SATURATION: 97 % | HEART RATE: 78 BPM | HEIGHT: 64 IN | BODY MASS INDEX: 27.31 KG/M2 | TEMPERATURE: 99 F | RESPIRATION RATE: 18 BRPM

## 2019-06-04 DIAGNOSIS — O21.9 NAUSEA AND VOMITING IN PREGNANCY: Primary | ICD-10-CM

## 2019-06-04 DIAGNOSIS — R10.9 ABDOMINAL PAIN DURING PREGNANCY IN SECOND TRIMESTER: ICD-10-CM

## 2019-06-04 DIAGNOSIS — O26.892 ABDOMINAL PAIN DURING PREGNANCY IN SECOND TRIMESTER: ICD-10-CM

## 2019-06-04 LAB
ALBUMIN SERPL BCP-MCNC: 3.1 G/DL (ref 3.5–5.2)
ALP SERPL-CCNC: 56 U/L (ref 55–135)
ALT SERPL W/O P-5'-P-CCNC: 16 U/L (ref 10–44)
ANION GAP SERPL CALC-SCNC: 6 MMOL/L (ref 8–16)
AST SERPL-CCNC: 15 U/L (ref 10–40)
BASOPHILS # BLD AUTO: 0.01 K/UL (ref 0–0.2)
BASOPHILS NFR BLD: 0.2 % (ref 0–1.9)
BILIRUB SERPL-MCNC: 0.4 MG/DL (ref 0.1–1)
BILIRUB UR QL STRIP: NEGATIVE
BUN SERPL-MCNC: 7 MG/DL (ref 6–20)
CALCIUM SERPL-MCNC: 9.1 MG/DL (ref 8.7–10.5)
CHLORIDE SERPL-SCNC: 103 MMOL/L (ref 95–110)
CLARITY UR: CLEAR
CO2 SERPL-SCNC: 27 MMOL/L (ref 23–29)
COLOR UR: YELLOW
CREAT SERPL-MCNC: 0.6 MG/DL (ref 0.5–1.4)
DIFFERENTIAL METHOD: ABNORMAL
EOSINOPHIL # BLD AUTO: 0 K/UL (ref 0–0.5)
EOSINOPHIL NFR BLD: 0.7 % (ref 0–8)
ERYTHROCYTE [DISTWIDTH] IN BLOOD BY AUTOMATED COUNT: 12.9 % (ref 11.5–14.5)
EST. GFR  (AFRICAN AMERICAN): >60 ML/MIN/1.73 M^2
EST. GFR  (NON AFRICAN AMERICAN): >60 ML/MIN/1.73 M^2
GLUCOSE SERPL-MCNC: 98 MG/DL (ref 70–110)
GLUCOSE UR QL STRIP: NEGATIVE
HCT VFR BLD AUTO: 37.2 % (ref 37–48.5)
HGB BLD-MCNC: 12.1 G/DL (ref 12–16)
HGB UR QL STRIP: NEGATIVE
KETONES UR QL STRIP: NEGATIVE
LEUKOCYTE ESTERASE UR QL STRIP: NEGATIVE
LIPASE SERPL-CCNC: 9 U/L (ref 4–60)
LYMPHOCYTES # BLD AUTO: 0.5 K/UL (ref 1–4.8)
LYMPHOCYTES NFR BLD: 8.3 % (ref 18–48)
MCH RBC QN AUTO: 29 PG (ref 27–31)
MCHC RBC AUTO-ENTMCNC: 32.5 G/DL (ref 32–36)
MCV RBC AUTO: 89 FL (ref 82–98)
MONOCYTES # BLD AUTO: 0.3 K/UL (ref 0.3–1)
MONOCYTES NFR BLD: 5.2 % (ref 4–15)
NEUTROPHILS # BLD AUTO: 4.8 K/UL (ref 1.8–7.7)
NEUTROPHILS NFR BLD: 85.6 % (ref 38–73)
NITRITE UR QL STRIP: NEGATIVE
PH UR STRIP: 7 [PH] (ref 5–8)
PLATELET # BLD AUTO: 194 K/UL (ref 150–350)
PMV BLD AUTO: 12.5 FL (ref 9.2–12.9)
POTASSIUM SERPL-SCNC: 4 MMOL/L (ref 3.5–5.1)
PROT SERPL-MCNC: 6.9 G/DL (ref 6–8.4)
PROT UR QL STRIP: NEGATIVE
RBC # BLD AUTO: 4.17 M/UL (ref 4–5.4)
SODIUM SERPL-SCNC: 136 MMOL/L (ref 136–145)
SP GR UR STRIP: 1.01 (ref 1–1.03)
URN SPEC COLLECT METH UR: NORMAL
UROBILINOGEN UR STRIP-ACNC: NEGATIVE EU/DL
WBC # BLD AUTO: 5.56 K/UL (ref 3.9–12.7)

## 2019-06-04 PROCEDURE — 63600175 PHARM REV CODE 636 W HCPCS: Performed by: NURSE PRACTITIONER

## 2019-06-04 PROCEDURE — 87086 URINE CULTURE/COLONY COUNT: CPT

## 2019-06-04 PROCEDURE — 83690 ASSAY OF LIPASE: CPT

## 2019-06-04 PROCEDURE — 80053 COMPREHEN METABOLIC PANEL: CPT

## 2019-06-04 PROCEDURE — 85025 COMPLETE CBC W/AUTO DIFF WBC: CPT

## 2019-06-04 PROCEDURE — 99284 EMERGENCY DEPT VISIT MOD MDM: CPT | Mod: 25

## 2019-06-04 PROCEDURE — 96365 THER/PROPH/DIAG IV INF INIT: CPT

## 2019-06-04 PROCEDURE — 81003 URINALYSIS AUTO W/O SCOPE: CPT

## 2019-06-04 PROCEDURE — 25000003 PHARM REV CODE 250: Performed by: NURSE PRACTITIONER

## 2019-06-04 RX ORDER — ACETAMINOPHEN 325 MG/1
650 TABLET ORAL
Status: COMPLETED | OUTPATIENT
Start: 2019-06-04 | End: 2019-06-04

## 2019-06-04 RX ADMIN — SODIUM CHLORIDE 1000 ML: 0.9 INJECTION, SOLUTION INTRAVENOUS at 09:06

## 2019-06-04 RX ADMIN — PROMETHAZINE HYDROCHLORIDE 25 MG: 25 INJECTION INTRAMUSCULAR; INTRAVENOUS at 10:06

## 2019-06-04 RX ADMIN — ACETAMINOPHEN 650 MG: 325 TABLET, FILM COATED ORAL at 09:06

## 2019-06-04 NOTE — ED PROVIDER NOTES
Encounter Date: 2019    SCRIBE #1 NOTE: I, Shoaib Da Silva, am scribing for, and in the presence of,  NINO Canada. I have scribed the following portions of the note - Other sections scribed: HPI and ROS.       History     Chief Complaint   Patient presents with    Emesis During Pregnancy     Nausea and vomiting - no relief with zofran.  Also reports abdominal cramping.  19 weeks pregnant.     CC: Emesis    HPI: Patient is a 16-weeks gravid 28-year-old female PMHx miscarriage, HTN who presents with nausea, vomiting (8 episodes in 24 hours), abdominal pain for the past week or so. Patient has had this before. She sees Dr. Crum for OB/GYN care; last visit was 3 weeks ago. She reports loose stool. She denies vaginal discharge, vaginal bleeding. Patient took Zofran this morning at approximately 05:00 this morning; however this did not provide relief.     The history is provided by the patient. No  was used.     Review of patient's allergies indicates:  No Known Allergies  Past Medical History:   Diagnosis Date    Hypertension     Miscarriage      Past Surgical History:   Procedure Laterality Date     SECTION      D & C (SUCTION) N/A 2017    [facility]formed [facility] at [facility] OR    DELIVERY-CEASAREAN SECTION N/A 2014    Performed by [facility] Muniz MD at [facility] L[facility]D OR    FRACTURE SURGERY      left leg    LEG SURGERY       Family History   Problem Relation Age of Onset    Hypertension Mother     Hypertension Maternal Grandmother     Kidney disease Maternal Grandmother     Diabetes Maternal Grandmother     Diabetes Paternal Grandmother     Hypertension Paternal Grandmother     Kidney disease Paternal Grandmother     Breast cancer Neg Hx     Colon cancer Neg Hx     Ovarian cancer Neg Hx      Social History     Tobacco Use    Smoking status: Former Smoker     Packs/day: 0.50     Years: 6.00     Pack years: 3.00     Types: Cigarettes    Smokeless tobacco:  Former User     Quit date: 8/31/2014    Tobacco comment: Pt smokes 4 cigarettes per day.   Substance Use Topics    Alcohol use: Not Currently    Drug use: No     Review of Systems   Respiratory: Negative for cough and shortness of breath.    Cardiovascular: Negative for chest pain.   Gastrointestinal: Positive for abdominal pain (cramping), nausea and vomiting. Negative for diarrhea.   Genitourinary: Negative for dysuria, vaginal bleeding and vaginal discharge.   Neurological: Negative for headaches.   All other systems reviewed and are negative.      Physical Exam     Initial Vitals [06/04/19 0921]   BP Pulse Resp Temp SpO2   105/66 (!) 113 18 97.5 °F (36.4 °C) 100 %      MAP       --         Physical Exam    Nursing note and vitals reviewed.  Constitutional: She appears well-developed and well-nourished. She is not diaphoretic. She is cooperative.  Non-toxic appearance. No distress.   HENT:   Head: Normocephalic and atraumatic.   Right Ear: External ear normal.   Left Ear: External ear normal.   Eyes: Conjunctivae and EOM are normal.   Neck: Normal range of motion. No tracheal deviation present.   Cardiovascular: Regular rhythm and intact distal pulses. Tachycardia present.    Pulses:       Radial pulses are 2+ on the right side, and 2+ on the left side.   Pulse 113 at triage.    Pulmonary/Chest: Effort normal and breath sounds normal. No stridor. No tachypnea and no bradypnea. No respiratory distress. She has no wheezes. She has no rhonchi. She has no rales. She exhibits no tenderness.   Abdominal: Soft. Bowel sounds are normal. She exhibits no distension and no mass. There is no tenderness. There is no guarding.   Gravid abdomen.   Neurological: She is alert and oriented to person, place, and time. She has normal strength. GCS score is 15. GCS eye subscore is 4. GCS verbal subscore is 5. GCS motor subscore is 6.   Skin: Skin is warm, dry and intact. No rash noted. No cyanosis or erythema. Nails show no  clubbing.   Psychiatric: She has a normal mood and affect. Her behavior is normal. Thought content normal.         ED Course   Procedures  Labs Reviewed   CBC W/ AUTO DIFFERENTIAL - Abnormal; Notable for the following components:       Result Value    Lymph # 0.5 (*)     Gran% 85.6 (*)     Lymph% 8.3 (*)     All other components within normal limits   COMPREHENSIVE METABOLIC PANEL - Abnormal; Notable for the following components:    Albumin 3.1 (*)     Anion Gap 6 (*)     All other components within normal limits   CULTURE, URINE   LIPASE   URINALYSIS          Imaging Results    None          Medical Decision Making:   History:   Old Medical Records: I decided to obtain old medical records.  Old Records Summarized: records from another hospital.       <> Summary of Records: Patient evaluated at [facility] following a fall on 05/26/2019.  Ultrasound done at that visit shows a single live IUP.  Fetal heart rate 137 beats per minute. No retroplacental abnormalities.  Normal amniotic fluid.  Ultrasound on 05/26 put her at 19 weeks 0 days +/-2 days.        APC / Resident Notes:   This is an evaluation of a 28 y.o. female that presents to the Emergency Department for vomiting during pregnancy.  19w2d by our IRMA listed. US from [facility] would put here between 20w0d and 20w2d. Physical Exam shows a non-toxic, afebrile, and well appearing female.  Gravid abdomen that is soft with no guarding or peritoneal signs. No focal tenderness on exam.  Mucous membranes are moist and appears well-hydrated.  No skin tenting. Vital signs are reassuring. If available, previous records reviewed. RESULTS:  CBC with no leukocytosis or anemia.  Normal platelet count.  Urinalysis without infection.  CMP with an albumin of 3.1, and gap 6, otherwise unremarkable.  Normal lipase. Bedside ultrasound performed with good fetal activity and movement with fetal heart rate detected.  Fetal heart tones on Doppler 168 bpm.     Consult:  Spoke with Dr. Crum, the  patient is OBGYN, recommendations to discharge home and follow up in clinic.  She recommends no admission for fetal monitoring at this time.  She is tolerating oral fluids prior to discharge. She reports feeling much better.    My overall impression is nausea and vomiting in pregnancy, abdominal pain/cramping and pregnancy. I considered, but at this time, do not suspect UTI, pyelonephritis, bowel obstruction, bowel perforation, appendicitis.    Patient reports quantity sufficient Zofran at home.  D/C Information:  Hydration, bland diet progress as tolerated. The diagnosis, treatment plan, instructions for follow-up and reevaluation with OBGYN as soon as possible as well as ED return precautions were discussed and understanding was verbalized. All questions or concerns have been addressed.  DIONNA Arreola, NINO-C       Scribe Attestation:   Scribe #1: I performed the above scribed service and the documentation accurately describes the services I performed. I attest to the accuracy of the note.    Attending Attestation:           Physician Attestation for Scribe:  Physician Attestation Statement for Scribe #1: I, NINO Canada, reviewed documentation, as scribed by Shoaib Da Silva in my presence, and it is both accurate and complete.                 ED Course as of Jun 04 1159   Tue Jun 04, 2019   1029 IV fluids infusing.  Patient reports abdominal cramping is improving.  Nausea is also improving.    [AF]   9584 Dr. Crum's office paged to discuss patient is different due dates.    [AF]   1151 Dr. Crum returned the call. Updated her on the patient status including CC, date discrepancy, lab findings, and that she is tolerating oral fluids.  Recommendations to discharge patient home and have her seen in clinic.     [AF]      ED Course User Index  [AF] NINO Canada     Clinical Impression:       ICD-10-CM ICD-9-CM   1. Nausea and vomiting in pregnancy O21.9 643.90   2. Abdominal pain during pregnancy in second  trimester O26.892 646.83    R10.9 789.00         Disposition:   Disposition: Discharged  Condition: Stable                        NINO Canada  06/04/19 115

## 2019-06-04 NOTE — DISCHARGE INSTRUCTIONS
Please return to the Emergency Department for any new or worsening symptoms including: worsening or changes in pain, fever, chest pain, shortness of breath, loss of consciousness, dizziness, weakness, or any other concerns.     Please follow up with OB/GYN within in the week. If you do not have one, you may contact the one listed on your discharge paperwork or you may also call the [facility] Appointment Desk at [phone] to schedule an appointment with one.     [place] diet and progress as tolerated.

## 2019-06-04 NOTE — ED TRIAGE NOTES
Pt complains of cramping, nausea and vomiting that began yesterday.  Pt reports she's 19 weeks pregnant, took zofran and promethazine without relief.  Pt denies vaginal bleeding or discharge.

## 2019-06-06 LAB — BACTERIA UR CULT: NORMAL

## 2019-06-11 ENCOUNTER — ROUTINE PRENATAL (OUTPATIENT)
Dept: OBSTETRICS AND GYNECOLOGY | Facility: CLINIC | Age: 29
End: 2019-06-11
Payer: MEDICAID

## 2019-06-11 VITALS — BODY MASS INDEX: 30.46 KG/M2 | WEIGHT: 177.5 LBS | DIASTOLIC BLOOD PRESSURE: 60 MMHG | SYSTOLIC BLOOD PRESSURE: 100 MMHG

## 2019-06-11 DIAGNOSIS — Z36.3 ANTENATAL SCREENING FOR MALFORMATION USING ULTRASONICS: ICD-10-CM

## 2019-06-11 DIAGNOSIS — Z3A.20 20 WEEKS GESTATION OF PREGNANCY: Primary | ICD-10-CM

## 2019-06-11 PROCEDURE — 99212 OFFICE O/P EST SF 10 MIN: CPT | Mod: PBBFAC,TH | Performed by: OBSTETRICS & GYNECOLOGY

## 2019-06-11 PROCEDURE — 99999 PR PBB SHADOW E&M-EST. PATIENT-LVL II: CPT | Mod: PBBFAC,,, | Performed by: OBSTETRICS & GYNECOLOGY

## 2019-06-11 PROCEDURE — 76805 PR US, OB 14+WKS, TRANSABD, SINGLE GESTATION: ICD-10-PCS | Mod: 26,S$PBB,, | Performed by: OBSTETRICS & GYNECOLOGY

## 2019-06-11 PROCEDURE — 76805 OB US >/= 14 WKS SNGL FETUS: CPT | Mod: PBBFAC | Performed by: OBSTETRICS & GYNECOLOGY

## 2019-06-11 PROCEDURE — 76805 OB US >/= 14 WKS SNGL FETUS: CPT | Mod: 26,S$PBB,, | Performed by: OBSTETRICS & GYNECOLOGY

## 2019-06-11 PROCEDURE — 99213 OFFICE O/P EST LOW 20 MIN: CPT | Mod: TH,25,S$PBB, | Performed by: OBSTETRICS & GYNECOLOGY

## 2019-06-11 PROCEDURE — 99999 PR PBB SHADOW E&M-EST. PATIENT-LVL II: ICD-10-PCS | Mod: PBBFAC,,, | Performed by: OBSTETRICS & GYNECOLOGY

## 2019-06-11 PROCEDURE — 99213 PR OFFICE/OUTPT VISIT, EST, LEVL III, 20-29 MIN: ICD-10-PCS | Mod: TH,25,S$PBB, | Performed by: OBSTETRICS & GYNECOLOGY

## 2019-06-11 NOTE — PROGRESS NOTES
20w2d here for OB visit.    Pregnancy complicated by:   x 1, declined TOLAC  Smoking quit early in pregnancy    Pt has no major complaints today.  Reports active fetus.  Denies vaginal bleeding, leakage of fluid, or contractions.    Anatomy u/s shows normal appearing fetus with dating c/w established EDC.  Limitation of today's u/s exam discussed.  Quad screen negative.     Test of cure for bacterial vaginosis negative, negative wet prep.  Encourage tobacco cessation.    Encourage daily low dose ASA ~14 wks for prevention of preE, IUGR, and stillbirths due to maternal risk factor.    Labor/preE precautions reviewed.  Kick counts.  Return 4 wks.  Voiced understanding.    Andrew Barrera MD

## 2019-06-24 ENCOUNTER — TELEPHONE (OUTPATIENT)
Dept: OTOLARYNGOLOGY | Facility: CLINIC | Age: 29
End: 2019-06-24

## 2019-06-24 DIAGNOSIS — O21.9 NAUSEA AND VOMITING IN PREGNANCY: Primary | ICD-10-CM

## 2019-06-24 RX ORDER — ONDANSETRON 4 MG/1
4 TABLET, ORALLY DISINTEGRATING ORAL EVERY 6 HOURS PRN
Qty: 30 TABLET | Refills: 1 | Status: SHIPPED | OUTPATIENT
Start: 2019-06-24 | End: 2019-07-19 | Stop reason: SDUPTHER

## 2019-06-24 RX ORDER — ONDANSETRON 4 MG/1
TABLET, FILM COATED ORAL
Qty: 10 TABLET | Refills: 0 | Status: CANCELLED | OUTPATIENT
Start: 2019-06-24

## 2019-07-19 DIAGNOSIS — O21.9 NAUSEA AND VOMITING IN PREGNANCY: ICD-10-CM

## 2019-07-19 RX ORDER — ONDANSETRON 4 MG/1
4 TABLET, ORALLY DISINTEGRATING ORAL EVERY 6 HOURS PRN
Qty: 30 TABLET | Refills: 1 | Status: SHIPPED | OUTPATIENT
Start: 2019-07-19 | End: 2019-07-26 | Stop reason: SDUPTHER

## 2019-07-26 ENCOUNTER — ROUTINE PRENATAL (OUTPATIENT)
Dept: OBSTETRICS AND GYNECOLOGY | Facility: CLINIC | Age: 29
End: 2019-07-26
Payer: MEDICAID

## 2019-07-26 VITALS — SYSTOLIC BLOOD PRESSURE: 94 MMHG | BODY MASS INDEX: 32.03 KG/M2 | WEIGHT: 186.63 LBS | DIASTOLIC BLOOD PRESSURE: 50 MMHG

## 2019-07-26 DIAGNOSIS — O21.9 NAUSEA AND VOMITING IN PREGNANCY: ICD-10-CM

## 2019-07-26 DIAGNOSIS — Z3A.26 26 WEEKS GESTATION OF PREGNANCY: Primary | ICD-10-CM

## 2019-07-26 DIAGNOSIS — Z36.3 ANTENATAL SCREENING FOR MALFORMATION USING ULTRASONICS: ICD-10-CM

## 2019-07-26 PROCEDURE — 99213 PR OFFICE/OUTPT VISIT, EST, LEVL III, 20-29 MIN: ICD-10-PCS | Mod: TH,S$PBB,, | Performed by: OBSTETRICS & GYNECOLOGY

## 2019-07-26 PROCEDURE — 99999 PR PBB SHADOW E&M-EST. PATIENT-LVL II: ICD-10-PCS | Mod: PBBFAC,,, | Performed by: OBSTETRICS & GYNECOLOGY

## 2019-07-26 PROCEDURE — 99999 PR PBB SHADOW E&M-EST. PATIENT-LVL II: CPT | Mod: PBBFAC,,, | Performed by: OBSTETRICS & GYNECOLOGY

## 2019-07-26 PROCEDURE — 99212 OFFICE O/P EST SF 10 MIN: CPT | Mod: PBBFAC,TH | Performed by: OBSTETRICS & GYNECOLOGY

## 2019-07-26 PROCEDURE — 99213 OFFICE O/P EST LOW 20 MIN: CPT | Mod: TH,S$PBB,, | Performed by: OBSTETRICS & GYNECOLOGY

## 2019-07-26 RX ORDER — ONDANSETRON 4 MG/1
4 TABLET, ORALLY DISINTEGRATING ORAL EVERY 6 HOURS PRN
Qty: 30 TABLET | Refills: 1 | Status: SHIPPED | OUTPATIENT
Start: 2019-07-26 | End: 2019-08-20 | Stop reason: SDUPTHER

## 2019-07-27 PROBLEM — O21.9 NAUSEA AND VOMITING DURING PREGNANCY PRIOR TO 22 WEEKS GESTATION: Status: RESOLVED | Noted: 2019-04-03 | Resolved: 2019-07-27

## 2019-07-27 PROBLEM — O21.0 HYPEREMESIS AFFECTING PREGNANCY, ANTEPARTUM: Status: RESOLVED | Noted: 2019-04-03 | Resolved: 2019-07-27

## 2019-07-27 NOTE — PROGRESS NOTES
"26w5d here for OB visit.    Pregnancy complicated by:   x 1, declined TOLAC  Smoking quit early in pregnancy    Last visit was 20 wks, "been busy".    No major complaints today.  Reports active fetus.  Denies vaginal bleeding, leakage of fluid, or contractions.    Refer to M for anatomy US/fetal growth.  Order 1 hr glucola, CBC.  Encourage tobacco cessation.  Encourage daily low dose ASA for prevention of preE, IUGR, and stillbirths due to maternal risk factor.    Pt requesting refill of zofran to use prn for nausea/vomiting.   Risks, benefits, and alternatives to zofran reviewed.   Dietary advice.   Cautioned on drossiness, no driving or operating machinery.    Labor/preE precautions.  Kick counts.  Return 2 wks.  Voiced understanding.      Andrew Barrera MD        "

## 2019-08-19 ENCOUNTER — ROUTINE PRENATAL (OUTPATIENT)
Dept: OBSTETRICS AND GYNECOLOGY | Facility: CLINIC | Age: 29
End: 2019-08-19
Payer: MEDICAID

## 2019-08-19 VITALS
SYSTOLIC BLOOD PRESSURE: 100 MMHG | BODY MASS INDEX: 33.28 KG/M2 | WEIGHT: 193.88 LBS | DIASTOLIC BLOOD PRESSURE: 62 MMHG

## 2019-08-19 DIAGNOSIS — Z3A.30 30 WEEKS GESTATION OF PREGNANCY: Primary | ICD-10-CM

## 2019-08-19 PROCEDURE — 99213 PR OFFICE/OUTPT VISIT, EST, LEVL III, 20-29 MIN: ICD-10-PCS | Mod: TH,S$PBB,, | Performed by: OBSTETRICS & GYNECOLOGY

## 2019-08-19 PROCEDURE — 99999 PR PBB SHADOW E&M-EST. PATIENT-LVL II: CPT | Mod: PBBFAC,,, | Performed by: OBSTETRICS & GYNECOLOGY

## 2019-08-19 PROCEDURE — 99213 OFFICE O/P EST LOW 20 MIN: CPT | Mod: TH,S$PBB,, | Performed by: OBSTETRICS & GYNECOLOGY

## 2019-08-19 PROCEDURE — 99212 OFFICE O/P EST SF 10 MIN: CPT | Mod: PBBFAC | Performed by: OBSTETRICS & GYNECOLOGY

## 2019-08-19 PROCEDURE — 99999 PR PBB SHADOW E&M-EST. PATIENT-LVL II: ICD-10-PCS | Mod: PBBFAC,,, | Performed by: OBSTETRICS & GYNECOLOGY

## 2019-08-19 NOTE — PROGRESS NOTES
30w1d here for OB visit.    Pregnancy complicated by:   x 1, declined TOLAC  Smoking quit early in pregnancy  Non-compliance with prenatal care    Pt has no major complaints today.  Reports active fetus.  Denies vaginal bleeding, leakage of fluid, or contractions.    Refer to [facility] for anatomy US/fetal growth.    Pt has not completed 1 hr glucola, timely testing advised, implications of uncontrolled DM discussed including but not limited to risk of fetal demise.    Encourage tobacco cessation.    Encourage daily low dose ASA for prevention.    Labor/preE precautions.  Kick counts.  Return 2 wks, or sooner.  Voiced understanding.      Andrew Barrera MD

## 2019-08-20 ENCOUNTER — TELEPHONE (OUTPATIENT)
Dept: OBSTETRICS AND GYNECOLOGY | Facility: CLINIC | Age: 29
End: 2019-08-20

## 2019-08-20 ENCOUNTER — LAB VISIT (OUTPATIENT)
Dept: LAB | Facility: HOSPITAL | Age: 29
End: 2019-08-20
Attending: OBSTETRICS & GYNECOLOGY
Payer: MEDICAID

## 2019-08-20 DIAGNOSIS — Z3A.30 30 WEEKS GESTATION OF PREGNANCY: ICD-10-CM

## 2019-08-20 DIAGNOSIS — O21.9 NAUSEA AND VOMITING IN PREGNANCY: ICD-10-CM

## 2019-08-20 DIAGNOSIS — Z3A.26 26 WEEKS GESTATION OF PREGNANCY: ICD-10-CM

## 2019-08-20 LAB
BASOPHILS # BLD AUTO: 0.01 K/UL (ref 0–0.2)
BASOPHILS NFR BLD: 0.2 % (ref 0–1.9)
DIFFERENTIAL METHOD: ABNORMAL
EOSINOPHIL # BLD AUTO: 0.1 K/UL (ref 0–0.5)
EOSINOPHIL NFR BLD: 1 % (ref 0–8)
ERYTHROCYTE [DISTWIDTH] IN BLOOD BY AUTOMATED COUNT: 13.4 % (ref 11.5–14.5)
GLUCOSE SERPL-MCNC: 78 MG/DL (ref 70–140)
HCT VFR BLD AUTO: 32.6 % (ref 37–48.5)
HGB BLD-MCNC: 10.3 G/DL (ref 12–16)
LYMPHOCYTES # BLD AUTO: 1 K/UL (ref 1–4.8)
LYMPHOCYTES NFR BLD: 17 % (ref 18–48)
MCH RBC QN AUTO: 28.1 PG (ref 27–31)
MCHC RBC AUTO-ENTMCNC: 31.6 G/DL (ref 32–36)
MCV RBC AUTO: 89 FL (ref 82–98)
MONOCYTES # BLD AUTO: 0.7 K/UL (ref 0.3–1)
MONOCYTES NFR BLD: 11.9 % (ref 4–15)
NEUTROPHILS # BLD AUTO: 4.2 K/UL (ref 1.8–7.7)
NEUTROPHILS NFR BLD: 69.9 % (ref 38–73)
PLATELET # BLD AUTO: 180 K/UL (ref 150–350)
PMV BLD AUTO: 12.3 FL (ref 9.2–12.9)
RBC # BLD AUTO: 3.67 M/UL (ref 4–5.4)
WBC # BLD AUTO: 6.06 K/UL (ref 3.9–12.7)

## 2019-08-20 PROCEDURE — 85025 COMPLETE CBC W/AUTO DIFF WBC: CPT

## 2019-08-20 PROCEDURE — 36415 COLL VENOUS BLD VENIPUNCTURE: CPT

## 2019-08-20 PROCEDURE — 86703 HIV-1/HIV-2 1 RESULT ANTBDY: CPT

## 2019-08-20 PROCEDURE — 82950 GLUCOSE TEST: CPT

## 2019-08-20 RX ORDER — ONDANSETRON 4 MG/1
4 TABLET, ORALLY DISINTEGRATING ORAL EVERY 6 HOURS PRN
Qty: 30 TABLET | Refills: 1 | Status: SHIPPED | OUTPATIENT
Start: 2019-08-20 | End: 2019-09-23 | Stop reason: SDUPTHER

## 2019-08-20 NOTE — TELEPHONE ENCOUNTER
----- Message from Kimmy Vieyra sent at 8/20/2019  9:02 AM CDT -----  Contact: Self   Refill: ondansetron (ZOFRAN) 4 MG       [id number] - [place], [place] [facility] [address] [place]  [place] [address]  Phone: [phone] Fax: [phone]

## 2019-08-21 LAB — HIV 1+2 AB+HIV1 P24 AG SERPL QL IA: NEGATIVE

## 2019-09-03 ENCOUNTER — ROUTINE PRENATAL (OUTPATIENT)
Dept: OBSTETRICS AND GYNECOLOGY | Facility: CLINIC | Age: 29
End: 2019-09-03
Payer: MEDICAID

## 2019-09-03 VITALS
DIASTOLIC BLOOD PRESSURE: 68 MMHG | BODY MASS INDEX: 34.23 KG/M2 | WEIGHT: 199.44 LBS | SYSTOLIC BLOOD PRESSURE: 104 MMHG

## 2019-09-03 DIAGNOSIS — Z3A.32 32 WEEKS GESTATION OF PREGNANCY: Primary | ICD-10-CM

## 2019-09-03 DIAGNOSIS — O99.013 ANEMIA AFFECTING PREGNANCY IN THIRD TRIMESTER: ICD-10-CM

## 2019-09-03 PROBLEM — Z34.93 PREGNANCY WITH ONE FETUS IN THIRD TRIMESTER: Status: ACTIVE | Noted: 2019-09-03

## 2019-09-03 PROCEDURE — 99213 PR OFFICE/OUTPT VISIT, EST, LEVL III, 20-29 MIN: ICD-10-PCS | Mod: TH,S$PBB,, | Performed by: OBSTETRICS & GYNECOLOGY

## 2019-09-03 PROCEDURE — 99212 OFFICE O/P EST SF 10 MIN: CPT | Mod: PBBFAC,TH | Performed by: OBSTETRICS & GYNECOLOGY

## 2019-09-03 PROCEDURE — 99999 PR PBB SHADOW E&M-EST. PATIENT-LVL II: CPT | Mod: PBBFAC,,, | Performed by: OBSTETRICS & GYNECOLOGY

## 2019-09-03 PROCEDURE — 99999 PR PBB SHADOW E&M-EST. PATIENT-LVL II: ICD-10-PCS | Mod: PBBFAC,,, | Performed by: OBSTETRICS & GYNECOLOGY

## 2019-09-03 PROCEDURE — 99213 OFFICE O/P EST LOW 20 MIN: CPT | Mod: TH,S$PBB,, | Performed by: OBSTETRICS & GYNECOLOGY

## 2019-09-03 RX ORDER — DOCUSATE SODIUM 100 MG/1
100 CAPSULE, LIQUID FILLED ORAL 2 TIMES DAILY PRN
Qty: 60 CAPSULE | Refills: 1 | Status: SHIPPED | OUTPATIENT
Start: 2019-09-03 | End: 2021-12-21

## 2019-09-03 RX ORDER — FERROUS SULFATE 325(65) MG
325 TABLET ORAL 2 TIMES DAILY
Qty: 60 TABLET | Refills: 1 | Status: ON HOLD | OUTPATIENT
Start: 2019-09-03 | End: 2019-10-24 | Stop reason: SDUPTHER

## 2019-09-03 NOTE — PROGRESS NOTES
32w2d here for OB visit.    Pregnancy complicated by:   x 1, declined TOLAC  Smoking quit early in pregnancy    No major complaints today.  Reports active fetus.  Denies vaginal bleeding, leakage of fluid, or contractions.    Refer to [facility] for fetal growth on 9/10.  1 hr glucola 78.  Encourage tobacco cessation.  Start fergon/colace for anemia.  Encourage daily low dose ASA for prevention.    Labor/preE precautions.  Kick counts.  Return 2 wks, or sooner.  Voiced understanding.      Andrew Barrera MD

## 2019-09-10 ENCOUNTER — PROCEDURE VISIT (OUTPATIENT)
Dept: MATERNAL FETAL MEDICINE | Facility: CLINIC | Age: 29
End: 2019-09-10
Payer: MEDICAID

## 2019-09-10 DIAGNOSIS — Z36.3 ANTENATAL SCREENING FOR MALFORMATION USING ULTRASONICS: ICD-10-CM

## 2019-09-10 PROCEDURE — 76805 PR US, OB 14+WKS, TRANSABD, SINGLE GESTATION: ICD-10-PCS | Mod: 26,S$PBB,, | Performed by: OBSTETRICS & GYNECOLOGY

## 2019-09-10 PROCEDURE — 76805 OB US >/= 14 WKS SNGL FETUS: CPT | Mod: PBBFAC,PO | Performed by: OBSTETRICS & GYNECOLOGY

## 2019-09-10 PROCEDURE — 76805 OB US >/= 14 WKS SNGL FETUS: CPT | Mod: 26,S$PBB,, | Performed by: OBSTETRICS & GYNECOLOGY

## 2019-09-10 NOTE — PROGRESS NOTES
Obstetrical ultrasound completed today.  See report in imaging section of [facility].

## 2019-09-12 ENCOUNTER — HOSPITAL ENCOUNTER (OUTPATIENT)
Facility: HOSPITAL | Age: 29
Discharge: HOME | End: 2019-09-12
Attending: OBSTETRICS & GYNECOLOGY | Admitting: OBSTETRICS & GYNECOLOGY
Payer: MEDICAID

## 2019-09-12 VITALS
DIASTOLIC BLOOD PRESSURE: 63 MMHG | HEART RATE: 86 BPM | TEMPERATURE: 98 F | SYSTOLIC BLOOD PRESSURE: 115 MMHG | RESPIRATION RATE: 20 BRPM

## 2019-09-12 LAB
AMORPH CRY URNS QL MICRO: ABNORMAL
BACTERIA #/AREA URNS HPF: ABNORMAL /HPF
BILIRUB UR QL STRIP: NEGATIVE
CLARITY UR: ABNORMAL
COLOR UR: YELLOW
GLUCOSE UR QL STRIP: NEGATIVE
HGB UR QL STRIP: NEGATIVE
KETONES UR QL STRIP: ABNORMAL
LEUKOCYTE ESTERASE UR QL STRIP: NEGATIVE
MICROSCOPIC COMMENT: ABNORMAL
NITRITE UR QL STRIP: NEGATIVE
PH UR STRIP: 8 [PH] (ref 5–8)
PROT UR QL STRIP: NEGATIVE
RBC #/AREA URNS HPF: 0 /HPF (ref 0–4)
SP GR UR STRIP: 1.01 (ref 1–1.03)
SQUAMOUS #/AREA URNS HPF: 10 /HPF
URN SPEC COLLECT METH UR: ABNORMAL
UROBILINOGEN UR STRIP-ACNC: NEGATIVE EU/DL
WBC #/AREA URNS HPF: 1 /HPF (ref 0–5)

## 2019-09-12 PROCEDURE — 81000 URINALYSIS NONAUTO W/SCOPE: CPT

## 2019-09-12 PROCEDURE — 99211 OFF/OP EST MAY X REQ PHY/QHP: CPT

## 2019-09-12 NOTE — NURSING
"Pt arrived to unit with c/o "slimy vaginal discharge" x 2 days and abdominal cramping. Denies sex within the last 24 h. Denies vaginal bleeding. Pt rates pain 7/10 using pain scale. Hx obtained and verified. POC reviewed with pt, pt verb understanding. Call bell within reach, will monitor. NAD.   "

## 2019-09-12 NOTE — NURSING
Pt d/c'd home. D/c instructions given to pt, pt verb understanding. Pt ambulated off unit with family. NAD.

## 2019-09-12 NOTE — DISCHARGE INSTRUCTIONS
Home Undelivered Discharge Instructions    After Discharge Orders:    Future Appointments   Date Time Provider Department Center   9/17/2019 11:30 AM Andrew Barrera MD [facility]   10/1/2019 11:30 AM Andrew Barrera MD [facility]   10/8/2019 11:30 AM Andrew Barrera MD [facility]   10/15/2019 11:30 AM Andrew Barrera MD [facility]   10/22/2019 11:30 AM Andrew Barrera MD [facility]       Follow up with Dr. Barrera on 9/17/19 @ 11:30 AM for prenatal visit.  Call MD for any questions or concerns    Current Discharge Medication List      CONTINUE these medications which have NOT CHANGED    Details   docusate sodium (COLACE) 100 MG capsule Take 1 capsule (100 mg total) by mouth 2 (two) times daily as needed for Constipation.  Qty: 60 capsule, Refills: 1    Associated Diagnoses: Anemia affecting pregnancy in third trimester      ferrous sulfate (FEOSOL) 325 mg (65 mg iron) Tab tablet Take 1 tablet (325 mg total) by mouth 2 (two) times daily.  Qty: 60 tablet, Refills: 1    Associated Diagnoses: Anemia affecting pregnancy in third trimester      ondansetron (ZOFRAN) 4 MG tablet TAKE 1 TABLET BY MOUTH DAILY AS NEEDED FOR NAUSEA  Qty: 10 tablet, Refills: 0      ondansetron (ZOFRAN-ODT) 4 MG TbDL Take 1 tablet (4 mg total) by mouth every 6 (six) hours as needed.  Qty: 30 tablet, Refills: 1    Associated Diagnoses: Nausea and vomiting in pregnancy      prenatal 25/iron fum/folic/dha (PRENATAL-1 ORAL) Take by mouth.      promethazine (PHENERGAN) 25 MG suppository Place 1 suppository (25 mg total) rectally every 6 (six) hours as needed for Nausea.  Qty: 10 suppository, Refills: 0      pyridoxine, vitamin B6, (VITAMIN B-6) 25 MG Tab Take 25 mg by mouth once daily.      ranitidine (ZANTAC) 150 MG capsule Take 1 capsule (150 mg total) by mouth nightly.  Qty: 20 capsule, Refills: 0                     · Diet:  normal diet as tolerated    · Rest: normal activity as  tolerated    Other instructions: Do kick counts once a day on your baby. Choose the time of day your baby is most active. Get in a comfortable lying or sitting position and time how long it takes to feel 10 kicks, twists, turns, swishes, or rolls. Call your physician or midwife if there have not been 10 kicks in 2 hours    Call physician or midwife, return to Labor and Delivery, call 911, or go to the nearest Emergency Room if: increased leakage or fluid, contractions more than  3 per  1 hour, decreased fetal movement, persistent low back pain or cramping, bleeding from vaginal area, difficulty urinating, pain with urination, difficulty breathing, new calf pain, persistent headache or vision change

## 2019-09-12 NOTE — NURSING
Notified Dr. Barrera of pts arrival and complaints. MD gave order to send u/a only at this time and cont to monitor pt to r/o labor.

## 2019-09-12 NOTE — NURSING
Notified Dr. Barrera of lab results. MD gave order to d/c pt home and follow up in office for next prenatal visit.

## 2019-09-17 ENCOUNTER — ROUTINE PRENATAL (OUTPATIENT)
Dept: OBSTETRICS AND GYNECOLOGY | Facility: CLINIC | Age: 29
End: 2019-09-17
Payer: MEDICAID

## 2019-09-17 VITALS
WEIGHT: 202.25 LBS | DIASTOLIC BLOOD PRESSURE: 74 MMHG | SYSTOLIC BLOOD PRESSURE: 110 MMHG | BODY MASS INDEX: 34.72 KG/M2

## 2019-09-17 DIAGNOSIS — Z3A.34 34 WEEKS GESTATION OF PREGNANCY: Primary | ICD-10-CM

## 2019-09-17 PROCEDURE — 99213 PR OFFICE/OUTPT VISIT, EST, LEVL III, 20-29 MIN: ICD-10-PCS | Mod: TH,S$PBB,, | Performed by: OBSTETRICS & GYNECOLOGY

## 2019-09-17 PROCEDURE — 99999 PR PBB SHADOW E&M-EST. PATIENT-LVL II: ICD-10-PCS | Mod: PBBFAC,,, | Performed by: OBSTETRICS & GYNECOLOGY

## 2019-09-17 PROCEDURE — 99999 PR PBB SHADOW E&M-EST. PATIENT-LVL II: CPT | Mod: PBBFAC,,, | Performed by: OBSTETRICS & GYNECOLOGY

## 2019-09-17 PROCEDURE — 99212 OFFICE O/P EST SF 10 MIN: CPT | Mod: PBBFAC | Performed by: OBSTETRICS & GYNECOLOGY

## 2019-09-17 PROCEDURE — 99213 OFFICE O/P EST LOW 20 MIN: CPT | Mod: TH,S$PBB,, | Performed by: OBSTETRICS & GYNECOLOGY

## 2019-09-17 NOTE — PROGRESS NOTES
34w2d here for OB visit.    Pregnancy complicated by:   x 1, declined TOLAC  Smoking quit early in pregnancy    Pt has no major complaints today.  Reports active fetus.  Denies vaginal bleeding, leakage of fluid, or contractions.    Pt seen MFM for fetal growth on 9/10.  Fetal size is appropriate for established gestational age.   No fetal structural malformations are identified; however, the anatomic survey is incomplete and limited due to late gestational age and fetal position.  AFV is normal.    Encourage tobacco cessation.  Start fergon/colace for anemia.  Encourage daily low dose ASA for prevention.    Labor/preE precautions.  Kick counts.  Return 2 wks, or sooner.  Voiced understanding.      Andrew Barrera MD

## 2019-09-23 ENCOUNTER — TELEPHONE (OUTPATIENT)
Dept: OBSTETRICS AND GYNECOLOGY | Facility: CLINIC | Age: 29
End: 2019-09-23

## 2019-09-23 DIAGNOSIS — O21.9 NAUSEA AND VOMITING IN PREGNANCY: ICD-10-CM

## 2019-09-23 RX ORDER — ONDANSETRON 4 MG/1
4 TABLET, ORALLY DISINTEGRATING ORAL EVERY 6 HOURS PRN
Qty: 30 TABLET | Refills: 1 | Status: ON HOLD | OUTPATIENT
Start: 2019-09-23 | End: 2019-10-24 | Stop reason: HOSPADM

## 2019-09-23 NOTE — TELEPHONE ENCOUNTER
----- Message from Sonya Marie sent at 9/23/2019  3:21 PM CDT -----  Contact: Self   Type:  Needs Medical Advice    Who Called:  Self     Symptoms (please be specific): Needs help sleeping       Pharmacy name and phone #:    [id number] - [place] - [address]  [address]  [place] [address]  Phone: [phone] Fax: [phone]        Would the patient rather a call back or a response via [facility]? call    Best Call Back Number:  [phone] (home)       Additional Information: asking for ZOFRAN to be called in again for her she needs assitance sleeping

## 2019-10-01 ENCOUNTER — ROUTINE PRENATAL (OUTPATIENT)
Dept: OBSTETRICS AND GYNECOLOGY | Facility: CLINIC | Age: 29
End: 2019-10-01
Payer: MEDICAID

## 2019-10-01 VITALS
DIASTOLIC BLOOD PRESSURE: 80 MMHG | WEIGHT: 205.25 LBS | BODY MASS INDEX: 35.23 KG/M2 | SYSTOLIC BLOOD PRESSURE: 118 MMHG

## 2019-10-01 DIAGNOSIS — Z3A.36 36 WEEKS GESTATION OF PREGNANCY: Primary | ICD-10-CM

## 2019-10-01 PROCEDURE — 99213 PR OFFICE/OUTPT VISIT, EST, LEVL III, 20-29 MIN: ICD-10-PCS | Mod: TH,S$PBB,, | Performed by: OBSTETRICS & GYNECOLOGY

## 2019-10-01 PROCEDURE — 99999 PR PBB SHADOW E&M-EST. PATIENT-LVL II: ICD-10-PCS | Mod: PBBFAC,,, | Performed by: OBSTETRICS & GYNECOLOGY

## 2019-10-01 PROCEDURE — 99213 OFFICE O/P EST LOW 20 MIN: CPT | Mod: TH,S$PBB,, | Performed by: OBSTETRICS & GYNECOLOGY

## 2019-10-01 PROCEDURE — 99212 OFFICE O/P EST SF 10 MIN: CPT | Mod: PBBFAC | Performed by: OBSTETRICS & GYNECOLOGY

## 2019-10-01 PROCEDURE — 87081 CULTURE SCREEN ONLY: CPT

## 2019-10-01 PROCEDURE — 99999 PR PBB SHADOW E&M-EST. PATIENT-LVL II: CPT | Mod: PBBFAC,,, | Performed by: OBSTETRICS & GYNECOLOGY

## 2019-10-01 NOTE — PROGRESS NOTES
36w2d here for OB visit.    Pregnancy complicated by:   x 1, declined TOLAC  Smoking quit early in pregnancy    No major complaints today.  Reports active fetus.  Denies vaginal bleeding, leakage of fluid, or contractions.    Order GBS.  Plan for repeat .  Risks, benefits, and alternatives to repeat  discussed.  Delivery consents signed.    Encourage tobacco cessation.  Start fergon/colace for anemia.  Encourage daily low dose ASA for prevention.    Labor/preE precautions.  Kick counts.  Return 1 wk, or sooner.  Voiced understanding.      Andrew Barrera MD

## 2019-10-03 LAB — BACTERIA SPEC AEROBE CULT: NORMAL

## 2019-10-08 ENCOUNTER — ROUTINE PRENATAL (OUTPATIENT)
Dept: OBSTETRICS AND GYNECOLOGY | Facility: CLINIC | Age: 29
End: 2019-10-08
Payer: MEDICAID

## 2019-10-08 VITALS
DIASTOLIC BLOOD PRESSURE: 82 MMHG | BODY MASS INDEX: 35.42 KG/M2 | WEIGHT: 206.38 LBS | SYSTOLIC BLOOD PRESSURE: 116 MMHG

## 2019-10-08 DIAGNOSIS — Z3A.37 37 WEEKS GESTATION OF PREGNANCY: Primary | ICD-10-CM

## 2019-10-08 PROCEDURE — 99213 PR OFFICE/OUTPT VISIT, EST, LEVL III, 20-29 MIN: ICD-10-PCS | Mod: TH,S$PBB,, | Performed by: OBSTETRICS & GYNECOLOGY

## 2019-10-08 PROCEDURE — 99213 OFFICE O/P EST LOW 20 MIN: CPT | Mod: TH,S$PBB,, | Performed by: OBSTETRICS & GYNECOLOGY

## 2019-10-08 PROCEDURE — 99999 PR PBB SHADOW E&M-EST. PATIENT-LVL II: ICD-10-PCS | Mod: PBBFAC,,, | Performed by: OBSTETRICS & GYNECOLOGY

## 2019-10-08 PROCEDURE — 99212 OFFICE O/P EST SF 10 MIN: CPT | Mod: PBBFAC | Performed by: OBSTETRICS & GYNECOLOGY

## 2019-10-08 PROCEDURE — 99999 PR PBB SHADOW E&M-EST. PATIENT-LVL II: CPT | Mod: PBBFAC,,, | Performed by: OBSTETRICS & GYNECOLOGY

## 2019-10-08 NOTE — PROGRESS NOTES
37w2d here for OB visit.    Pregnancy complicated by:   x 1, declined TOLAC  Smoking quit early in pregnancy    Pt has no major complaints today.  Reports active fetus.  Denies vaginal bleeding, leakage of fluid, or contractions.  GBS negative.  Encourage tobacco cessation.  Continue fergon/colace for anemia.  Encourage daily low dose ASA for prevention.  Labor/preE precautions.  Kick counts.  Return 1 wk, or sooner.  Voiced understanding.      Andrew Barrera MD

## 2019-10-15 ENCOUNTER — ROUTINE PRENATAL (OUTPATIENT)
Dept: OBSTETRICS AND GYNECOLOGY | Facility: CLINIC | Age: 29
End: 2019-10-15
Payer: MEDICAID

## 2019-10-15 VITALS
SYSTOLIC BLOOD PRESSURE: 118 MMHG | DIASTOLIC BLOOD PRESSURE: 72 MMHG | WEIGHT: 210.75 LBS | BODY MASS INDEX: 36.18 KG/M2

## 2019-10-15 DIAGNOSIS — Z3A.38 38 WEEKS GESTATION OF PREGNANCY: Primary | ICD-10-CM

## 2019-10-15 PROCEDURE — 99213 OFFICE O/P EST LOW 20 MIN: CPT | Mod: TH,S$PBB,, | Performed by: OBSTETRICS & GYNECOLOGY

## 2019-10-15 PROCEDURE — 99212 OFFICE O/P EST SF 10 MIN: CPT | Mod: PBBFAC | Performed by: OBSTETRICS & GYNECOLOGY

## 2019-10-15 PROCEDURE — 99999 PR PBB SHADOW E&M-EST. PATIENT-LVL II: CPT | Mod: PBBFAC,,, | Performed by: OBSTETRICS & GYNECOLOGY

## 2019-10-15 PROCEDURE — 99213 PR OFFICE/OUTPT VISIT, EST, LEVL III, 20-29 MIN: ICD-10-PCS | Mod: TH,S$PBB,, | Performed by: OBSTETRICS & GYNECOLOGY

## 2019-10-15 PROCEDURE — 99999 PR PBB SHADOW E&M-EST. PATIENT-LVL II: ICD-10-PCS | Mod: PBBFAC,,, | Performed by: OBSTETRICS & GYNECOLOGY

## 2019-10-15 RX ORDER — SODIUM CHLORIDE, SODIUM LACTATE, POTASSIUM CHLORIDE, CALCIUM CHLORIDE 600; 310; 30; 20 MG/100ML; MG/100ML; MG/100ML; MG/100ML
INJECTION, SOLUTION INTRAVENOUS CONTINUOUS
Status: CANCELLED | OUTPATIENT
Start: 2019-10-15

## 2019-10-15 RX ORDER — OXYTOCIN/RINGER'S LACTATE 30/500 ML
95 PLASTIC BAG, INJECTION (ML) INTRAVENOUS ONCE
Status: CANCELLED | OUTPATIENT
Start: 2019-10-15 | End: 2019-10-15

## 2019-10-15 RX ORDER — OXYTOCIN/RINGER'S LACTATE 30/500 ML
334 PLASTIC BAG, INJECTION (ML) INTRAVENOUS ONCE
Status: CANCELLED | OUTPATIENT
Start: 2019-10-15 | End: 2019-10-15

## 2019-10-15 RX ORDER — MUPIROCIN 20 MG/G
OINTMENT TOPICAL
Status: CANCELLED | OUTPATIENT
Start: 2019-10-15

## 2019-10-15 RX ORDER — MISOPROSTOL 100 UG/1
800 TABLET ORAL
Status: CANCELLED | OUTPATIENT
Start: 2019-10-15

## 2019-10-15 RX ORDER — SODIUM CITRATE AND CITRIC ACID MONOHYDRATE 334; 500 MG/5ML; MG/5ML
30 SOLUTION ORAL
Status: CANCELLED | OUTPATIENT
Start: 2019-10-15

## 2019-10-15 NOTE — PROGRESS NOTES
38w2d here for OB visit.    Pregnancy complicated by:   x 1, declined TOLAC  Smoking quit early in pregnancy    No major complaints today.  Reports active fetus.  Denies vaginal bleeding, leakage of fluid, or contractions.    Plan for repeat  10/22.  Risks, benefits, and alternatives to repeat  discussed.  Pre-op instructions reviewed.    Encourage tobacco cessation.  Continue fergon/colace for anemia.  Encourage daily low dose ASA for prevention.    Labor/preE precautions.  Kick counts.  Go to L&D for any concerns.  Return prn.  Voiced understanding.      Andrew Barrera MD

## 2019-10-17 ENCOUNTER — TELEPHONE (OUTPATIENT)
Dept: OBSTETRICS AND GYNECOLOGY | Facility: CLINIC | Age: 29
End: 2019-10-17

## 2019-10-17 NOTE — TELEPHONE ENCOUNTER
Pt. Was advised to go get her refill on Zofran if this does not help or if she can't keep water or anything down to go to the ED     ----- Message from Kristy Briseno sent at 10/17/2019  9:20 AM CDT -----  Contact: Carolyn [phone]  Type:  Needs Medical Advice/Symptom-based Call    Who Called: Rod    Symptoms (please be specific): extreme vommitting     How long has patient had these symptoms:  Over night    Would the patient rather a call back or a response via [facility]? Call back    Best Call Back Number: [phone]    Additional Information: (#the patient reports that she experienced extreme vommitting last night. She wants to know if something can be called in to the pharmacy:   [id number] - [place], [place] - [address] AT [address])

## 2019-10-21 ENCOUNTER — ANESTHESIA EVENT (OUTPATIENT)
Dept: OBSTETRICS AND GYNECOLOGY | Facility: HOSPITAL | Age: 29
End: 2019-10-21
Payer: MEDICAID

## 2019-10-22 ENCOUNTER — HOSPITAL ENCOUNTER (INPATIENT)
Facility: HOSPITAL | Age: 29
LOS: 2 days | Discharge: HOME | End: 2019-10-24
Attending: OBSTETRICS & GYNECOLOGY | Admitting: OBSTETRICS & GYNECOLOGY
Payer: MEDICAID

## 2019-10-22 ENCOUNTER — ANESTHESIA (OUTPATIENT)
Dept: OBSTETRICS AND GYNECOLOGY | Facility: HOSPITAL | Age: 29
End: 2019-10-22
Payer: MEDICAID

## 2019-10-22 DIAGNOSIS — O99.013 ANEMIA AFFECTING PREGNANCY IN THIRD TRIMESTER: ICD-10-CM

## 2019-10-22 DIAGNOSIS — Z98.891 S/P CESAREAN SECTION: Primary | ICD-10-CM

## 2019-10-22 DIAGNOSIS — Z3A.38 38 WEEKS GESTATION OF PREGNANCY: ICD-10-CM

## 2019-10-22 PROBLEM — Z34.93 PREGNANCY WITH ONE FETUS IN THIRD TRIMESTER: Status: RESOLVED | Noted: 2019-09-03 | Resolved: 2019-10-22

## 2019-10-22 PROBLEM — Z3A.39 39 WEEKS GESTATION OF PREGNANCY: Status: ACTIVE | Noted: 2019-10-22

## 2019-10-22 LAB
ABO + RH BLD: NORMAL
BASOPHILS # BLD AUTO: 0.01 K/UL (ref 0–0.2)
BASOPHILS NFR BLD: 0.2 % (ref 0–1.9)
BLD GP AB SCN CELLS X3 SERPL QL: NORMAL
DIFFERENTIAL METHOD: ABNORMAL
EOSINOPHIL # BLD AUTO: 0.1 K/UL (ref 0–0.5)
EOSINOPHIL NFR BLD: 0.8 % (ref 0–8)
ERYTHROCYTE [DISTWIDTH] IN BLOOD BY AUTOMATED COUNT: 13.7 % (ref 11.5–14.5)
HCT VFR BLD AUTO: 32.6 % (ref 37–48.5)
HGB BLD-MCNC: 10.4 G/DL (ref 12–16)
IMM GRANULOCYTES # BLD AUTO: 0.03 K/UL (ref 0–0.04)
IMM GRANULOCYTES NFR BLD AUTO: 0.5 % (ref 0–0.5)
LYMPHOCYTES # BLD AUTO: 1.1 K/UL (ref 1–4.8)
LYMPHOCYTES NFR BLD: 18 % (ref 18–48)
MCH RBC QN AUTO: 26.6 PG (ref 27–31)
MCHC RBC AUTO-ENTMCNC: 31.9 G/DL (ref 32–36)
MCV RBC AUTO: 83 FL (ref 82–98)
MONOCYTES # BLD AUTO: 0.7 K/UL (ref 0.3–1)
MONOCYTES NFR BLD: 10.8 % (ref 4–15)
NEUTROPHILS # BLD AUTO: 4.3 K/UL (ref 1.8–7.7)
NEUTROPHILS NFR BLD: 69.7 % (ref 38–73)
NRBC BLD-RTO: 0 /100 WBC
PLATELET # BLD AUTO: 185 K/UL (ref 150–350)
PMV BLD AUTO: 12.7 FL (ref 9.2–12.9)
RBC # BLD AUTO: 3.91 M/UL (ref 4–5.4)
WBC # BLD AUTO: 6.22 K/UL (ref 3.9–12.7)

## 2019-10-22 PROCEDURE — 59514 CESAREAN DELIVERY ONLY: CPT | Mod: 80,GB,, | Performed by: OBSTETRICS & GYNECOLOGY

## 2019-10-22 PROCEDURE — 59514 CESAREAN DELIVERY ONLY: CPT | Mod: CRNA,,, | Performed by: NURSE ANESTHETIST, CERTIFIED REGISTERED

## 2019-10-22 PROCEDURE — 59514 PRA REAN DELIVERY ONLY: ICD-10-PCS | Mod: ANES,,, | Performed by: ANESTHESIOLOGY

## 2019-10-22 PROCEDURE — 11000001 HC ACUTE MED/SURG PRIVATE ROOM

## 2019-10-22 PROCEDURE — 36000685 HC CESAREAN SECTION LEVEL I

## 2019-10-22 PROCEDURE — 25000003 PHARM REV CODE 250: Performed by: ANESTHESIOLOGY

## 2019-10-22 PROCEDURE — 25000003 PHARM REV CODE 250: Performed by: OBSTETRICS & GYNECOLOGY

## 2019-10-22 PROCEDURE — 85025 COMPLETE CBC W/AUTO DIFF WBC: CPT

## 2019-10-22 PROCEDURE — 59514 PRA REAN DELIVERY ONLY: ICD-10-PCS | Mod: CRNA,,, | Performed by: NURSE ANESTHETIST, CERTIFIED REGISTERED

## 2019-10-22 PROCEDURE — 37000008 HC ANESTHESIA 1ST 15 MINUTES: Performed by: OBSTETRICS & GYNECOLOGY

## 2019-10-22 PROCEDURE — 59514 CESAREAN DELIVERY ONLY: CPT | Mod: GB,,, | Performed by: OBSTETRICS & GYNECOLOGY

## 2019-10-22 PROCEDURE — 37000009 HC ANESTHESIA EA ADD 15 MINS: Performed by: OBSTETRICS & GYNECOLOGY

## 2019-10-22 PROCEDURE — 59514 PR CESAREAN DELIVERY ONLY: ICD-10-PCS | Mod: 80,GB,, | Performed by: OBSTETRICS & GYNECOLOGY

## 2019-10-22 PROCEDURE — 59514 PR CESAREAN DELIVERY ONLY: ICD-10-PCS | Mod: GB,,, | Performed by: OBSTETRICS & GYNECOLOGY

## 2019-10-22 PROCEDURE — 86901 BLOOD TYPING SEROLOGIC RH(D): CPT

## 2019-10-22 PROCEDURE — 63600175 PHARM REV CODE 636 W HCPCS: Performed by: NURSE ANESTHETIST, CERTIFIED REGISTERED

## 2019-10-22 PROCEDURE — 63600175 PHARM REV CODE 636 W HCPCS: Performed by: ANESTHESIOLOGY

## 2019-10-22 PROCEDURE — 63600175 PHARM REV CODE 636 W HCPCS: Performed by: OBSTETRICS & GYNECOLOGY

## 2019-10-22 PROCEDURE — 63600175 PHARM REV CODE 636 W HCPCS

## 2019-10-22 PROCEDURE — 36415 COLL VENOUS BLD VENIPUNCTURE: CPT

## 2019-10-22 PROCEDURE — 86592 SYPHILIS TEST NON-TREP QUAL: CPT

## 2019-10-22 PROCEDURE — 59514 CESAREAN DELIVERY ONLY: CPT | Mod: ANES,,, | Performed by: ANESTHESIOLOGY

## 2019-10-22 PROCEDURE — 27200688 HC TRAY, SPINAL-HYPER/ ISOBARIC: Performed by: NURSE ANESTHETIST, CERTIFIED REGISTERED

## 2019-10-22 PROCEDURE — S0028 INJECTION, FAMOTIDINE, 20 MG: HCPCS | Performed by: ANESTHESIOLOGY

## 2019-10-22 RX ORDER — CEFAZOLIN SODIUM 1 G/3ML
INJECTION, POWDER, FOR SOLUTION INTRAMUSCULAR; INTRAVENOUS
Status: DISCONTINUED | OUTPATIENT
Start: 2019-10-22 | End: 2019-10-22

## 2019-10-22 RX ORDER — SODIUM CHLORIDE, SODIUM LACTATE, POTASSIUM CHLORIDE, CALCIUM CHLORIDE 600; 310; 30; 20 MG/100ML; MG/100ML; MG/100ML; MG/100ML
INJECTION, SOLUTION INTRAVENOUS CONTINUOUS
Status: DISCONTINUED | OUTPATIENT
Start: 2019-10-22 | End: 2019-10-22

## 2019-10-22 RX ORDER — ADHESIVE BANDAGE
30 BANDAGE TOPICAL 2 TIMES DAILY PRN
Status: DISCONTINUED | OUTPATIENT
Start: 2019-10-23 | End: 2019-10-24 | Stop reason: HOSPADM

## 2019-10-22 RX ORDER — OXYTOCIN/RINGER'S LACTATE 30/500 ML
334 PLASTIC BAG, INJECTION (ML) INTRAVENOUS ONCE
Status: DISCONTINUED | OUTPATIENT
Start: 2019-10-22 | End: 2019-10-22

## 2019-10-22 RX ORDER — MUPIROCIN 20 MG/G
OINTMENT TOPICAL
Status: DISCONTINUED | OUTPATIENT
Start: 2019-10-22 | End: 2019-10-22

## 2019-10-22 RX ORDER — HYDROCORTISONE 25 MG/G
CREAM TOPICAL 3 TIMES DAILY PRN
Status: DISCONTINUED | OUTPATIENT
Start: 2019-10-22 | End: 2019-10-24 | Stop reason: HOSPADM

## 2019-10-22 RX ORDER — KETOROLAC TROMETHAMINE 30 MG/ML
30 INJECTION, SOLUTION INTRAMUSCULAR; INTRAVENOUS EVERY 6 HOURS
Status: COMPLETED | OUTPATIENT
Start: 2019-10-22 | End: 2019-10-23

## 2019-10-22 RX ORDER — PHENYLEPHRINE HYDROCHLORIDE 10 MG/ML
INJECTION INTRAVENOUS
Status: DISCONTINUED | OUTPATIENT
Start: 2019-10-22 | End: 2019-10-22

## 2019-10-22 RX ORDER — MUPIROCIN 20 MG/G
1 OINTMENT TOPICAL 2 TIMES DAILY
Status: DISCONTINUED | OUTPATIENT
Start: 2019-10-22 | End: 2019-10-24 | Stop reason: HOSPADM

## 2019-10-22 RX ORDER — DIPHENHYDRAMINE HCL 25 MG
25 CAPSULE ORAL EVERY 4 HOURS PRN
Status: DISCONTINUED | OUTPATIENT
Start: 2019-10-22 | End: 2019-10-24 | Stop reason: HOSPADM

## 2019-10-22 RX ORDER — OXYCODONE AND ACETAMINOPHEN 5; 325 MG/1; MG/1
1 TABLET ORAL EVERY 4 HOURS PRN
Status: DISCONTINUED | OUTPATIENT
Start: 2019-10-22 | End: 2019-10-24 | Stop reason: HOSPADM

## 2019-10-22 RX ORDER — CEFAZOLIN SODIUM 2 G/50ML
2 SOLUTION INTRAVENOUS
Status: COMPLETED | OUTPATIENT
Start: 2019-10-22 | End: 2019-10-22

## 2019-10-22 RX ORDER — OXYCODONE AND ACETAMINOPHEN 10; 325 MG/1; MG/1
1 TABLET ORAL EVERY 4 HOURS PRN
Status: DISCONTINUED | OUTPATIENT
Start: 2019-10-22 | End: 2019-10-24 | Stop reason: HOSPADM

## 2019-10-22 RX ORDER — ONDANSETRON 8 MG/1
8 TABLET, ORALLY DISINTEGRATING ORAL EVERY 8 HOURS PRN
Status: DISCONTINUED | OUTPATIENT
Start: 2019-10-22 | End: 2019-10-24 | Stop reason: HOSPADM

## 2019-10-22 RX ORDER — METOCLOPRAMIDE HYDROCHLORIDE 5 MG/ML
INJECTION INTRAMUSCULAR; INTRAVENOUS
Status: DISCONTINUED | OUTPATIENT
Start: 2019-10-22 | End: 2019-10-22

## 2019-10-22 RX ORDER — AMOXICILLIN 250 MG
1 CAPSULE ORAL NIGHTLY PRN
Status: DISCONTINUED | OUTPATIENT
Start: 2019-10-22 | End: 2019-10-24 | Stop reason: HOSPADM

## 2019-10-22 RX ORDER — HYDROMORPHONE HCL IN 0.9% NACL 6 MG/30 ML
PATIENT CONTROLLED ANALGESIA SYRINGE INTRAVENOUS CONTINUOUS
Status: DISCONTINUED | OUTPATIENT
Start: 2019-10-22 | End: 2019-10-24 | Stop reason: HOSPADM

## 2019-10-22 RX ORDER — BISACODYL 10 MG
10 SUPPOSITORY, RECTAL RECTAL ONCE AS NEEDED
Status: DISCONTINUED | OUTPATIENT
Start: 2019-10-22 | End: 2019-10-24 | Stop reason: HOSPADM

## 2019-10-22 RX ORDER — NALOXONE HCL 0.4 MG/ML
0.02 VIAL (ML) INJECTION
Status: DISCONTINUED | OUTPATIENT
Start: 2019-10-22 | End: 2019-10-24 | Stop reason: HOSPADM

## 2019-10-22 RX ORDER — SODIUM CITRATE AND CITRIC ACID MONOHYDRATE 334; 500 MG/5ML; MG/5ML
30 SOLUTION ORAL ONCE
Status: COMPLETED | OUTPATIENT
Start: 2019-10-22 | End: 2019-10-22

## 2019-10-22 RX ORDER — OXYTOCIN/RINGER'S LACTATE 30/500 ML
95 PLASTIC BAG, INJECTION (ML) INTRAVENOUS ONCE
Status: COMPLETED | OUTPATIENT
Start: 2019-10-22 | End: 2019-10-22

## 2019-10-22 RX ORDER — DOCUSATE SODIUM 100 MG/1
200 CAPSULE, LIQUID FILLED ORAL 2 TIMES DAILY
Status: DISCONTINUED | OUTPATIENT
Start: 2019-10-22 | End: 2019-10-24 | Stop reason: HOSPADM

## 2019-10-22 RX ORDER — SODIUM CHLORIDE, SODIUM LACTATE, POTASSIUM CHLORIDE, CALCIUM CHLORIDE 600; 310; 30; 20 MG/100ML; MG/100ML; MG/100ML; MG/100ML
INJECTION, SOLUTION INTRAVENOUS CONTINUOUS
Status: ACTIVE | OUTPATIENT
Start: 2019-10-22 | End: 2019-10-23

## 2019-10-22 RX ORDER — OXYTOCIN/RINGER'S LACTATE 30/500 ML
95 PLASTIC BAG, INJECTION (ML) INTRAVENOUS ONCE
Status: DISCONTINUED | OUTPATIENT
Start: 2019-10-22 | End: 2019-10-22

## 2019-10-22 RX ORDER — SIMETHICONE 80 MG
1 TABLET,CHEWABLE ORAL EVERY 6 HOURS PRN
Status: DISCONTINUED | OUTPATIENT
Start: 2019-10-22 | End: 2019-10-24 | Stop reason: HOSPADM

## 2019-10-22 RX ORDER — OXYTOCIN 10 [USP'U]/ML
INJECTION, SOLUTION INTRAMUSCULAR; INTRAVENOUS
Status: DISCONTINUED | OUTPATIENT
Start: 2019-10-22 | End: 2019-10-22

## 2019-10-22 RX ORDER — IBUPROFEN 400 MG/1
800 TABLET ORAL EVERY 8 HOURS
Status: DISCONTINUED | OUTPATIENT
Start: 2019-10-23 | End: 2019-10-24 | Stop reason: HOSPADM

## 2019-10-22 RX ORDER — ONDANSETRON HYDROCHLORIDE 2 MG/ML
INJECTION, SOLUTION INTRAMUSCULAR; INTRAVENOUS
Status: DISCONTINUED | OUTPATIENT
Start: 2019-10-22 | End: 2019-10-22

## 2019-10-22 RX ORDER — SODIUM CITRATE AND CITRIC ACID MONOHYDRATE 334; 500 MG/5ML; MG/5ML
30 SOLUTION ORAL
Status: DISCONTINUED | OUTPATIENT
Start: 2019-10-22 | End: 2019-10-22

## 2019-10-22 RX ORDER — FENTANYL CITRATE 50 UG/ML
INJECTION, SOLUTION INTRAMUSCULAR; INTRAVENOUS
Status: DISCONTINUED | OUTPATIENT
Start: 2019-10-22 | End: 2019-10-22

## 2019-10-22 RX ORDER — FAMOTIDINE 10 MG/ML
20 INJECTION INTRAVENOUS ONCE
Status: COMPLETED | OUTPATIENT
Start: 2019-10-22 | End: 2019-10-22

## 2019-10-22 RX ORDER — ACETAMINOPHEN 10 MG/ML
INJECTION, SOLUTION INTRAVENOUS
Status: DISCONTINUED | OUTPATIENT
Start: 2019-10-22 | End: 2019-10-22

## 2019-10-22 RX ORDER — MISOPROSTOL 200 UG/1
800 TABLET ORAL
Status: DISCONTINUED | OUTPATIENT
Start: 2019-10-22 | End: 2019-10-22

## 2019-10-22 RX ADMIN — PHENYLEPHRINE HYDROCHLORIDE 100 MCG: 10 INJECTION INTRAVENOUS at 12:10

## 2019-10-22 RX ADMIN — CEFAZOLIN SODIUM 2 G: 1 POWDER, FOR SOLUTION INTRAMUSCULAR; INTRAVENOUS at 12:10

## 2019-10-22 RX ADMIN — PHENYLEPHRINE HYDROCHLORIDE 200 MCG: 10 INJECTION INTRAVENOUS at 12:10

## 2019-10-22 RX ADMIN — METOCLOPRAMIDE HYDROCHLORIDE 10 MG: 5 INJECTION INTRAMUSCULAR; INTRAVENOUS at 12:10

## 2019-10-22 RX ADMIN — ONDANSETRON 4 MG: 2 INJECTION, SOLUTION INTRAMUSCULAR; INTRAVENOUS at 12:10

## 2019-10-22 RX ADMIN — SODIUM CHLORIDE, SODIUM LACTATE, POTASSIUM CHLORIDE, AND CALCIUM CHLORIDE 1000 ML: .6; .31; .03; .02 INJECTION, SOLUTION INTRAVENOUS at 09:10

## 2019-10-22 RX ADMIN — SODIUM CHLORIDE, SODIUM LACTATE, POTASSIUM CHLORIDE, AND CALCIUM CHLORIDE 1000 ML: .6; .31; .03; .02 INJECTION, SOLUTION INTRAVENOUS at 10:10

## 2019-10-22 RX ADMIN — ONDANSETRON 8 MG: 8 TABLET, ORALLY DISINTEGRATING ORAL at 03:10

## 2019-10-22 RX ADMIN — PROMETHAZINE HYDROCHLORIDE 12.5 MG: 25 INJECTION INTRAMUSCULAR; INTRAVENOUS at 04:10

## 2019-10-22 RX ADMIN — FAMOTIDINE 20 MG: 10 INJECTION INTRAVENOUS at 11:10

## 2019-10-22 RX ADMIN — Medication 95 MILLI-UNITS/MIN: at 02:10

## 2019-10-22 RX ADMIN — OXYTOCIN 20 UNITS: 10 INJECTION, SOLUTION INTRAMUSCULAR; INTRAVENOUS at 12:10

## 2019-10-22 RX ADMIN — SODIUM CHLORIDE, SODIUM LACTATE, POTASSIUM CHLORIDE, AND CALCIUM CHLORIDE: .6; .31; .03; .02 INJECTION, SOLUTION INTRAVENOUS at 11:10

## 2019-10-22 RX ADMIN — MUPIROCIN 1 G: 20 OINTMENT TOPICAL at 09:10

## 2019-10-22 RX ADMIN — Medication: at 02:10

## 2019-10-22 RX ADMIN — FENTANYL CITRATE 50 MCG: 50 INJECTION, SOLUTION INTRAMUSCULAR; INTRAVENOUS at 12:10

## 2019-10-22 RX ADMIN — SODIUM CHLORIDE, SODIUM LACTATE, POTASSIUM CHLORIDE, AND CALCIUM CHLORIDE: .6; .31; .03; .02 INJECTION, SOLUTION INTRAVENOUS at 10:10

## 2019-10-22 RX ADMIN — FENTANYL CITRATE 20 MCG: 50 INJECTION, SOLUTION INTRAMUSCULAR; INTRAVENOUS at 12:10

## 2019-10-22 RX ADMIN — KETOROLAC TROMETHAMINE 30 MG: 30 INJECTION, SOLUTION INTRAMUSCULAR at 06:10

## 2019-10-22 RX ADMIN — CEFAZOLIN SODIUM 2 G: 2 SOLUTION INTRAVENOUS at 11:10

## 2019-10-22 RX ADMIN — ACETAMINOPHEN 1000 MG: 10 INJECTION, SOLUTION INTRAVENOUS at 12:10

## 2019-10-22 RX ADMIN — SODIUM CHLORIDE, SODIUM LACTATE, POTASSIUM CHLORIDE, AND CALCIUM CHLORIDE: .6; .31; .03; .02 INJECTION, SOLUTION INTRAVENOUS at 07:10

## 2019-10-22 RX ADMIN — SODIUM CITRATE AND CITRIC ACID MONOHYDRATE 30 ML: 500; 334 SOLUTION ORAL at 11:10

## 2019-10-22 RX ADMIN — FENTANYL CITRATE 30 MCG: 50 INJECTION, SOLUTION INTRAMUSCULAR; INTRAVENOUS at 12:10

## 2019-10-22 RX ADMIN — SODIUM CHLORIDE, SODIUM LACTATE, POTASSIUM CHLORIDE, AND CALCIUM CHLORIDE: .6; .31; .03; .02 INJECTION, SOLUTION INTRAVENOUS at 12:10

## 2019-10-22 RX ADMIN — PHENYLEPHRINE HYDROCHLORIDE 300 MCG: 10 INJECTION INTRAVENOUS at 12:10

## 2019-10-22 NOTE — PLAN OF CARE
Mother  from infant admission into NICU. Plan to stimulate breast 8 times in 24 hours using hand expression and double electric breast pump.

## 2019-10-22 NOTE — PLAN OF CARE
Tolerating liquid diet. Arambula cath draining clear yellow urine to gravity drainage.  Nausea subsided post phenergan.  Verbalizes pain control with PCA pain medications.  Verbalizes knowledge of plan of care.

## 2019-10-22 NOTE — NURSING
0845 -Pt is  at 39 weeks 2 days arrived to L&D unit for scheduled repeat c/s. Care assumed. Full assessment done, history and medications reviewed with pt. Pt gowned and given hibiclens shower. IV bolus initiated per order. SCDs applied. EFM, TOCO, automatic b/p and pulse ox applied. Pt and family updated on plan of care with questions answered. Bed in low locked position, call bell in reach.

## 2019-10-22 NOTE — TRANSFER OF CARE
"Anesthesia Transfer of Care Note    Patient: Carolyn Pearson    Procedure(s) Performed: Procedure(s) (LRB):   SECTION (N/A)    Patient location: Labor and Delivery    Anesthesia Type: spinal    Transport from OR: Transported from OR on room air with adequate spontaneous ventilation    Post pain: adequate analgesia    Post assessment: no apparent anesthetic complications and tolerated procedure well    Post vital signs: stable    Level of consciousness: awake and alert    Nausea/Vomiting: no nausea/vomiting    Complications: none    Transfer of care protocol was followed      Last vitals:   Visit Vitals  /70 (BP Location: Right arm, Patient Position: Sitting)   Pulse 72   Temp 36.4 °C (97.5 °F) (Oral)   Resp 14   Ht 5' 4" (1.626 m)   Wt 95.6 kg (210 lb 12.2 oz)   LMP 2019 (Approximate)   SpO2 100%   Breastfeeding? Unknown   BMI 36.18 kg/m²     "

## 2019-10-22 NOTE — L&D DELIVERY NOTE
[facility]   Operative Report  Obstetrics & Gynecology      Date of Surgery:  10/22/2019     Surgeon:  Andrew Barrera MD     Assistant:  Darren Perez MD      Preoperative diagnosis:     Gutierrez IUP at 39w2d for repeat  secondary to declined trail of labor after   Anemia, iron deficiency       Postoperative diagnosis:     Gutierrez IUP at 39w2d s/p repeat  section   Live infant  Anemia, iron deficiency      Procedure performed:  Repeat low transverse  section via pfannenstiel skin incision     Anesthesia:  Spinal      IV Fluids:  700 mL of LR     Urine Output:  160 mL, clear at end of procedure       Estimated Blood Loss:  385 mL with no replacements     Specimens:  Cord blood    Findings:      Live female infant, APGAR 1 min: 9, 5 min: 9, transfer to well baby  Weight pending at time of note   AROM at time of uterine incision with moderate fluid, light meconium, no odor  Grossly normal uterus, tubes and ovaries    Complications:  No immediate complications    Indications for the Procedure:      See H&P for complete details.  In brief, Carolyn Pearson is a 28 y.o.  at 39w2d gestation who is here for a scheduled repeat  delivery secondary to declined trail of labor after  to the avoid risk of uterine rupture.  Maternal and fetal status was overall reassuring.      Pt was informed of the risks, benefits, alternatives and possible complications to  and blood transfusion including pre-admission, admission, and post-admission procedures and expectations.  Risks of  section included, but were not limited to, death, urinary and/or fecal incontinence, injury to bladder and/or urinary tract, injury to bowel and/or intestinal obstruction, risk of infection, damage to major blood vessels, hemorrhage requiring transfusion of blood products and/or hysterectomy, painful intercourse, ovarian failure requiring hormone administration,  pulmonary embolism, fistula formation, sexual dysfunction, hernia, failure of wound to heal, permanent and disfiguring scarring.  In the event of a hysterectomy +/- bilateral salpingo-oophorectomy (BS&O) if uterine/ovarian injury or uncontrollable hemorrhage were to occur, the patient was counseled extensively on the inability to become pregnant following a hysterectomy and in the event of a BS&O will result in surgical menopause.  All of the patients questions were answered to her satisfaction.  She voiced understanding and acceptance of these risks.  Informed consent was obtained for  delivery, blood transfusions and all indicated procedures.     Description of the Procedure:      The patient was taken to the operating room where a time out was performed to confirm the correct patient and correct procedure.  Spinal anesthesia was obtained without difficulty.  Pt was then prepped and draped in a normal sterile fashion in the dorsal supine position with a leftward tilt.  A Pfannenstiel skin incision was then made with the scalpel and carried through to the underlying layer of fascia with the Bovie.  The fascia was then incised in the midline and the incision extended laterally with Guillaume scissors.  The superior aspect of the fascia was then grasped with Kocher clamps, elevated and the underlying rectus muscles were dissected off bluntly.  Attention was then turned to the inferior aspect of the fascial incision which, in a similar fashion, was grasped, tented up with Kocher clamps, and the rectus muscles were dissected off bluntly.  The rectus muscles were then  in the midline, and the peritoneum identified, tented up, and entered sharply with Metzenbaum scissors.  The peritoneal incision was then extended superiorly and inferiorly with good visualization of the bladder.  A low transverse uterine incision was made well above the bladder reflection with the scalpel.  The uterine incision was extended  cephalo-caudad fashion and the infant's head delivered atraumatically followed by the remainder of the body without difficulty.  The mouth and nose were suctioned and the cord clamped and cut.  The infant was vigorous with a strong cry and handed to the awaiting NICU NNP.  Cord blood was obtained.  The placenta was removed spontaneously; the uterus was exteriorized and cleared of all clots and debris.  The uterine incision was repaired with 1-0 Monocryl in a running, locked fashion.  A second layer of the same suture was used to obtain excellent hemostasis.  The uterus was returned to the abdomen. The gutters were cleaned of all clots and debris.  Hemostasis was noted.  Seprafilm adhesion barrier was applied over uterine incision.  The rectus muscles were reapproximated with 2-0 chromic.  The fascia was reapproximated with 0 vicryl in a running fashion.  The skin was closed with absorbable Insorb staples.  Steri-strips and abdominal bandage dressing was applied to incision.  The patient tolerated the procedure well.  Sponge, lap and needle counts were correct time two.  Two grams of Ancef was given prior to the procedure.  The patient was transferred to the L&D recovery room in stable condition.  Findings and expectations were discussed with the patient.  All of her questions were answered to her satisfaction and she voiced understanding.       Andrew Barrera MD

## 2019-10-22 NOTE — LACTATION NOTE
Patient does not want to pump at this time d/t abdominal pain. Encourage to call when ready. Discussed milk production supply and demand. Patient verbalized understanding.

## 2019-10-22 NOTE — ANESTHESIA PREPROCEDURE EVALUATION
10/22/2019    Pre-operative evaluation for Procedure(s) (LRB):   SECTION (N/A)    Carolyn Pearson is a 28 y.o. female     Patient Active Problem List   Diagnosis    Tobacco smoking affecting pregnancy in first trimester    History of  delivery affecting pregnancy    Pregnancy with one fetus in third trimester    38 weeks gestation of pregnancy       Review of patient's allergies indicates:  No Known Allergies    No current facility-administered medications on file prior to encounter.      Current Outpatient Medications on File Prior to Encounter   Medication Sig Dispense Refill    docusate sodium (COLACE) 100 MG capsule Take 1 capsule (100 mg total) by mouth 2 (two) times daily as needed for Constipation. 60 capsule 1    ferrous sulfate (FEOSOL) 325 mg (65 mg iron) Tab tablet Take 1 tablet (325 mg total) by mouth 2 (two) times daily. 60 tablet 1    ondansetron (ZOFRAN) 4 MG tablet TAKE 1 TABLET BY MOUTH DAILY AS NEEDED FOR NAUSEA 10 tablet 0    ondansetron (ZOFRAN-ODT) 4 MG TbDL Take 1 tablet (4 mg total) by mouth every 6 (six) hours as needed. 30 tablet 1    prenatal 25/iron fum/folic/dha (PRENATAL-1 ORAL) Take by mouth.      promethazine (PHENERGAN) 25 MG suppository Place 1 suppository (25 mg total) rectally every 6 (six) hours as needed for Nausea. 10 suppository 0    pyridoxine, vitamin B6, (VITAMIN B-6) 25 MG Tab Take 25 mg by mouth once daily.      ranitidine (ZANTAC) 150 MG capsule Take 1 capsule (150 mg total) by mouth nightly. 20 capsule 0       Past Surgical History:   Procedure Laterality Date     SECTION      FRACTURE SURGERY      left leg    LEG SURGERY         VITALS  CBC:   Recent Labs     10/22/19  0944   WBC 6.22   RBC 3.91*   HGB 10.4*   HCT 32.6*      MCV 83   MCH 26.6*   MCHC 31.9*         Anesthesia Evaluation    I have reviewed the Patient Summary Reports.     I have reviewed the Medications.     Review of Systems  Anesthesia Hx:  No previous  Anesthesia  History of prior surgery of interest to airway management or planning: Denies Family Hx of Anesthesia complications.   Denies Personal Hx of Anesthesia complications.   Social:  Former Smoker    Cardiovascular:   Hypertension    Pulmonary:  Pulmonary Normal    Renal/:  Renal/ Normal     Hepatic/GI:  Hepatic/GI Normal    OB/GYN/PEDS:  Prior c/s   Neurological:  Neurology Normal    Endocrine:  Endocrine Normal    Dermatological:  Skin Normal    Psych:  Psychiatric Normal           Physical Exam  General:  Well nourished    Airway/Jaw/Neck:  Airway Findings: Mouth Opening: Normal Tongue: Large  General Airway Assessment: Adult  Mallampati: II  Improves to I with phonation.  TM Distance: Normal, at least 6 cm  Jaw/Neck Findings:  Neck ROM: Normal ROM      Dental:  Dental Findings: In tact   Chest/Lungs:  Chest/Lungs Findings: Clear to auscultation     Heart/Vascular:  Heart Findings: Rate: Tachycardia  Rhythm: Regular Rhythm        Mental Status:  Mental Status Findings:  Cooperative         Anesthesia Plan  Type of Anesthesia, risks & benefits discussed:  Anesthesia Type:  CSE, spinal, epidural  Patient's Preference:   Intra-op Monitoring Plan: standard ASA monitors  Intra-op Monitoring Plan Comments:   Post Op Pain Control Plan:   Post Op Pain Control Plan Comments:   Induction:   IV  Beta Blocker:  Patient is not currently on a Beta-Blocker (No further documentation required).       Informed Consent: Patient understands risks and agrees with Anesthesia plan.  Questions answered. Anesthesia consent signed with patient.  ASA Score: 2     Day of Surgery Review of History & Physical: I have interviewed and examined the patient. I have reviewed the patient's H&P dated:  There are no significant changes.          Ready For Surgery From Anesthesia Perspective.

## 2019-10-22 NOTE — LACTATION NOTE
10/22/19 1535   Maternal Assessment   Breast Density Bilateral:;soft   Areola Bilateral:;elastic   Nipples Bilateral:;everted   Maternal Infant Feeding   Maternal Emotional State relaxed   Equipment Type   Breast Pump Type double electric, hospital grade   Breast Pump Flange Type hard   Breast Pump Flange Size 24 mm   Breast Pumping   Breast Pumping Interventions early pumping promoted   Breast Pumping double electric breast pump utilized   Mother was taught hand expression of breastmilk/colostrum. She was instructed to:   Sit upright and lean forward, if possible.   When feasible, apply warm, wet compress over breasts for a few minutes.    Perform gentle breast massage.   Form a C with her hand and place it about 1 inch back from the areola with the nipple centered between her index finger and her thumb.   Press, compress, relax:  Using her finger and thumb, apply pressure in an inward direction toward the breast without stretching the tissue, compress the breast tissue between her finger and thumb, then relax her finger and thumb. Repeat process for a few minutes.   Rotate placement of finger and thumb on the breasts to facilitate emptying.   Collect expressed breastmilk/colostrum with a spoon or cup and feed immediately to the baby, if able.   If unable to feed immediately, place breastmilk/colostrum directly into a sterile storage container for later use. Place the babys breast milk label (with the date and time of collection and the names of mother's medications) on the container. Reviewed proper handling and storage of expressed breastmilk.   Patient effectively return demonstrated and verbalized understanding.    [employer] pump, tubing, collections containers and labels brought to bedside.  Discussed proper pump setting of initiation phase.  Instructed on proper usage of pump and to adjust suction according to maximum comfort level.  Verified appropriate flange fit.  Educated on the  frequency and duration of pumping in order to promote and maintain a full milk supply.  Hands on pumping technique reviewed.  Encouraged hand expression after pumping.  Instructed on cleaning of breast pump parts.  Written instructions also given.  Pt verbalized understanding and appropriate recall for proper milk handling, collection, labeling, storage and transportation.    Patient denies any pain or discomfort.

## 2019-10-23 LAB
BASOPHILS # BLD AUTO: 0.01 K/UL (ref 0–0.2)
BASOPHILS NFR BLD: 0.2 % (ref 0–1.9)
DIFFERENTIAL METHOD: ABNORMAL
EOSINOPHIL # BLD AUTO: 0.1 K/UL (ref 0–0.5)
EOSINOPHIL NFR BLD: 0.9 % (ref 0–8)
ERYTHROCYTE [DISTWIDTH] IN BLOOD BY AUTOMATED COUNT: 13.6 % (ref 11.5–14.5)
HCT VFR BLD AUTO: 26.2 % (ref 37–48.5)
HGB BLD-MCNC: 8.3 G/DL (ref 12–16)
IMM GRANULOCYTES # BLD AUTO: 0.03 K/UL (ref 0–0.04)
IMM GRANULOCYTES NFR BLD AUTO: 0.5 % (ref 0–0.5)
LYMPHOCYTES # BLD AUTO: 0.9 K/UL (ref 1–4.8)
LYMPHOCYTES NFR BLD: 13.9 % (ref 18–48)
MCH RBC QN AUTO: 26.1 PG (ref 27–31)
MCHC RBC AUTO-ENTMCNC: 31.7 G/DL (ref 32–36)
MCV RBC AUTO: 82 FL (ref 82–98)
MONOCYTES # BLD AUTO: 0.8 K/UL (ref 0.3–1)
MONOCYTES NFR BLD: 11.4 % (ref 4–15)
NEUTROPHILS # BLD AUTO: 4.8 K/UL (ref 1.8–7.7)
NEUTROPHILS NFR BLD: 73.1 % (ref 38–73)
NRBC BLD-RTO: 0 /100 WBC
PLATELET # BLD AUTO: 148 K/UL (ref 150–350)
PMV BLD AUTO: 12.8 FL (ref 9.2–12.9)
RBC # BLD AUTO: 3.18 M/UL (ref 4–5.4)
RPR SER QL: NORMAL
WBC # BLD AUTO: 6.56 K/UL (ref 3.9–12.7)

## 2019-10-23 PROCEDURE — 63600175 PHARM REV CODE 636 W HCPCS: Performed by: OBSTETRICS & GYNECOLOGY

## 2019-10-23 PROCEDURE — 11000001 HC ACUTE MED/SURG PRIVATE ROOM

## 2019-10-23 PROCEDURE — 25000003 PHARM REV CODE 250: Performed by: OBSTETRICS & GYNECOLOGY

## 2019-10-23 PROCEDURE — 99231 SBSQ HOSP IP/OBS SF/LOW 25: CPT | Mod: ,,, | Performed by: OBSTETRICS & GYNECOLOGY

## 2019-10-23 PROCEDURE — 36415 COLL VENOUS BLD VENIPUNCTURE: CPT

## 2019-10-23 PROCEDURE — 85025 COMPLETE CBC W/AUTO DIFF WBC: CPT

## 2019-10-23 PROCEDURE — 99231 PR SUBSEQUENT HOSPITAL CARE,LEVL I: ICD-10-PCS | Mod: ,,, | Performed by: OBSTETRICS & GYNECOLOGY

## 2019-10-23 RX ADMIN — OXYCODONE HYDROCHLORIDE AND ACETAMINOPHEN 1 TABLET: 5; 325 TABLET ORAL at 09:10

## 2019-10-23 RX ADMIN — MUPIROCIN 1 G: 20 OINTMENT TOPICAL at 08:10

## 2019-10-23 RX ADMIN — DOCUSATE SODIUM 200 MG: 100 CAPSULE, LIQUID FILLED ORAL at 09:10

## 2019-10-23 RX ADMIN — OXYCODONE HYDROCHLORIDE AND ACETAMINOPHEN 1 TABLET: 5; 325 TABLET ORAL at 04:10

## 2019-10-23 RX ADMIN — KETOROLAC TROMETHAMINE 30 MG: 30 INJECTION, SOLUTION INTRAMUSCULAR at 12:10

## 2019-10-23 RX ADMIN — KETOROLAC TROMETHAMINE 30 MG: 30 INJECTION, SOLUTION INTRAMUSCULAR at 06:10

## 2019-10-23 RX ADMIN — MUPIROCIN 1 G: 20 OINTMENT TOPICAL at 09:10

## 2019-10-23 RX ADMIN — KETOROLAC TROMETHAMINE 30 MG: 30 INJECTION, SOLUTION INTRAMUSCULAR at 01:10

## 2019-10-23 RX ADMIN — DOCUSATE SODIUM 200 MG: 100 CAPSULE, LIQUID FILLED ORAL at 08:10

## 2019-10-23 RX ADMIN — IBUPROFEN 800 MG: 400 TABLET, FILM COATED ORAL at 06:10

## 2019-10-23 NOTE — NURSING
Pt not currently in room at this time per family she is visiting baby in NICU. Informed family to page nurse upon return to room.

## 2019-10-23 NOTE — NURSING
Pt informed that dressing is to be removed today and informed staff that she was going to NICU to visit baby

## 2019-10-23 NOTE — NURSING
Pt was escorted back to unit by security coming from smoking. Per staff pt was upset when she was encouraged to return to unit

## 2019-10-23 NOTE — PROGRESS NOTES
"[facility]    Obstetrics & Gynecology  Progress Note      Patient Name:  Carolyn Pearson  MRN:  [id number]  Admission Date:  10/22/2019  Hospital Length of Stay:  1  Attending Physician:  Andrew Barrera MD    Subjective:     Date:  10/23/2019    Principal Problem:  39 weeks gestation of pregnancy    Hospital Course:  Post-op Day # 1 - s/p repeat  at 39w2d    Pt c/o mild crampy incisional pain  No acute events overnight  Denies dizziness, SOB, KEENAN, or CP  Pain well controlled  Lochia less than menses  Breast non-tender, non-engorged  Ambulating, tolerating diet, and voiding without diffculty   Bonding well with baby    Objective:     Temp:  [96.6 °F (35.9 °C)-97.3 °F (36.3 °C)] 96.8 °F (36 °C)  Pulse:  [] 84  Resp:  [18-20] 20  SpO2:  [91 %-100 %] 100 %  BP: (106-129)/(57-77) 106/58    BP (!) 106/58 (BP Location: Right arm, Patient Position: Lying)   Pulse 84   Temp 96.8 °F (36 °C) (Oral)   Resp 20   Ht 5' 4" (1.626 m)   Wt 95.6 kg (210 lb 12.2 oz)   LMP 2019 (Approximate)   SpO2 100%   Breastfeeding? Yes Comment: pumping  BMI 36.18 kg/m²     Intake/Output Summary (Last 24 hours) at 10/23/2019 1317  Last data filed at 10/23/2019 0930  Gross per 24 hour   Intake 480 ml   Output 600 ml   Net -120 ml   Clear, fidelia urine    Physical Exam:   Constitutional: She appears well-developed. No distress.                             Alert and oriented x 3.   HEENT:  No scleral icterus. Neck supple. Moist mucus membranes.   LUNGS:  Clear to auscultation bilaterally.   HEART:  Regular rate and rhythm with physiologic heart sounds.   ABDOMEN:  Soft, appropriately tender, non-distended, no guarding, rigidity, or rebound with normoactive bowel sounds.  FUNDUS:  Firm, nontender below the umbilicus.   INCISION: Clean, dry, & intact.  EXTREMITIES:  No cramping, claudication. Trace edema LE. +2 distal pulses. Symmetric, full range of motion, negative [place].  NEUROLOGIC:  Grossly intact " bilaterally.  +2 DTR's.   PSYCH:  Mood and affect appropriate.   PERINEUM:  Intact with light lochia.     Labs:      Recent Results (from the past 336 hour(s))   CBC auto differential    Collection Time: 10/23/19  5:18 AM   Result Value Ref Range    WBC 6.56 3.90 - 12.70 K/uL    Hemoglobin 8.3 (L) 12.0 - 16.0 g/dL    Hematocrit 26.2 (L) 37.0 - 48.5 %    Platelets 148 (L) 150 - 350 K/uL   CBC auto differential    Collection Time: 10/22/19  9:44 AM   Result Value Ref Range    WBC 6.22 3.90 - 12.70 K/uL    Hemoglobin 10.4 (L) 12.0 - 16.0 g/dL    Hematocrit 32.6 (L) 37.0 - 48.5 %    Platelets 185 150 - 350 K/uL     ABO:  O POS    Assessment:     1. Post-op day # 1 - IUP at 39w2d s/p repeat low transverse  - progressing well  2. Anemia, acute on chronic, blood loss expected postop      Plan:     Continue post-op care  Review post-partum care instructions and precautions  Oral pain control  Fe supplementation  Advance diet as tolerated  Encourage ambulation, SCD's  Encourage breast-feeding  Surgical/delivery findings, labs/studies, and expectations were discussed with pt.  All questions answered and pt voiced understanding.     Disposition:  As patient meets milestones, will plan to discharge.      Andrew Barrera MD

## 2019-10-23 NOTE — ANESTHESIA POSTPROCEDURE EVALUATION
Anesthesia Post Evaluation    Patient: Carolyn Pearson    Procedure(s) Performed: Procedure(s) (LRB):   SECTION (N/A)    Final Anesthesia Type: spinal  Patient location during evaluation: labor & delivery  Patient participation: Yes- Able to Participate  Level of consciousness: awake and alert and oriented  Post-procedure vital signs: reviewed and stable  Pain management: adequate  Airway patency: patent  PONV status at discharge: No PONV  Anesthetic complications: no      Cardiovascular status: blood pressure returned to baseline, stable and hemodynamically stable  Respiratory status: unassisted, room air and spontaneous ventilation  Hydration status: euvolemic  Follow-up not needed.          Vitals Value Taken Time   /58 10/23/2019  7:25 AM   Temp 36 °C (96.8 °F) 10/23/2019  7:25 AM   Pulse 84 10/23/2019  7:25 AM   Resp 20 10/23/2019  7:25 AM   SpO2 100 % 10/23/2019  7:25 AM         No case tracking events are documented in the log.      Pain/Edith Score: Pain Rating Prior to Med Admin: 8 (10/23/2019  9:26 AM)

## 2019-10-23 NOTE — NURSING
Received report. Pt AAOx4 with no c/o pain. Saline lock in place to site is clear. Pt  informed of plan of care and safety maintained with bed low side rails up x2 with nurse call bell within reach

## 2019-10-23 NOTE — LACTATION NOTE
10/23/19 0835   Maternal Assessment   Breast Density Bilateral:;soft   Areola Bilateral:;elastic   Nipples Bilateral:;everted   Maternal Infant Feeding   Maternal Emotional State assist needed   Equipment Type   Breast Pump Type double electric, hospital grade   Breast Pump Flange Type hard   Breast Pump Flange Size 24 mm   Breast Pumping   Breast Pumping Interventions frequent pumping encouraged   Breast Pumping double electric breast pump utilized   pt states has not pumped overnight because she does not want to breastfeed baby -discussed benefits of colostrum and breast milk for baby and willing to try pumping again-reinforced massage and hand expression and frequent pumping -re-instructed in correct use of pump and drops of colostrum collected and brought to NICU and  mouth care done

## 2019-10-24 VITALS
TEMPERATURE: 98 F | SYSTOLIC BLOOD PRESSURE: 117 MMHG | RESPIRATION RATE: 18 BRPM | BODY MASS INDEX: 35.98 KG/M2 | WEIGHT: 210.75 LBS | HEIGHT: 64 IN | OXYGEN SATURATION: 100 % | DIASTOLIC BLOOD PRESSURE: 78 MMHG | HEART RATE: 79 BPM

## 2019-10-24 PROBLEM — O99.019 ANEMIA DURING PREGNANCY: Status: ACTIVE | Noted: 2019-10-24

## 2019-10-24 PROCEDURE — 25000003 PHARM REV CODE 250: Performed by: OBSTETRICS & GYNECOLOGY

## 2019-10-24 PROCEDURE — 99238 PR HOSPITAL DISCHARGE DAY,<30 MIN: ICD-10-PCS | Mod: ,,, | Performed by: OBSTETRICS & GYNECOLOGY

## 2019-10-24 PROCEDURE — 99238 HOSP IP/OBS DSCHRG MGMT 30/<: CPT | Mod: ,,, | Performed by: OBSTETRICS & GYNECOLOGY

## 2019-10-24 RX ORDER — IBUPROFEN 600 MG/1
600 TABLET ORAL EVERY 6 HOURS PRN
Qty: 40 TABLET | Refills: 0 | Status: SHIPPED | OUTPATIENT
Start: 2019-10-24 | End: 2020-10-23

## 2019-10-24 RX ORDER — OXYCODONE AND ACETAMINOPHEN 5; 325 MG/1; MG/1
1 TABLET ORAL EVERY 4 HOURS PRN
Qty: 20 TABLET | Refills: 0 | Status: SHIPPED | OUTPATIENT
Start: 2019-10-24 | End: 2021-12-21

## 2019-10-24 RX ORDER — FERROUS SULFATE 325(65) MG
325 TABLET ORAL 2 TIMES DAILY
Qty: 60 TABLET | Refills: 1 | Status: SHIPPED | OUTPATIENT
Start: 2019-10-24 | End: 2021-12-21

## 2019-10-24 RX ADMIN — IBUPROFEN 800 MG: 400 TABLET, FILM COATED ORAL at 05:10

## 2019-10-24 RX ADMIN — OXYCODONE HYDROCHLORIDE AND ACETAMINOPHEN 1 TABLET: 5; 325 TABLET ORAL at 12:10

## 2019-10-24 RX ADMIN — OXYCODONE HYDROCHLORIDE AND ACETAMINOPHEN 1 TABLET: 10; 325 TABLET ORAL at 07:10

## 2019-10-24 NOTE — PLAN OF CARE
Mother has decided to stop pumping and wants her infant to be formula fed-has received education on risks of formula Instructed on the risks of formula feeding including:  Lacks the nutrients found in colostrums to help prevent infection, mature the gut, aid in digestion and resist allergies   Contains artificial additives and preservatives which increases incidence of contamination   Increase spitting up due to slower digestion   Increased cost and requires preparation, including bottle sanitation and formula refrigeration   Increased incidence of NEC for the  baby   Increased risk of diabetes with family history, SIDS and ear infections   Skipped feedings for the breastfeeding mother increases chance of engorgement, mastitis and plugged ducts   Decreases breastfeeding babys appetite resulting in poor feeding session, decreased breast stimulation and poor milk supply   Exposes the breastfeeding baby to the possibility of allergic reactions and colic  Pt states understanding and verbalized appropriate recall.

## 2019-10-24 NOTE — PROGRESS NOTES
Called Dr. Barrera to update on pt and pt's request to go home. States that he will put discharge orders in.
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
## 2019-10-24 NOTE — PROGRESS NOTES
Discharge instructions given to pt. Pt verbalized understanding with no questions at this time. States that she is going to visit the infant in NICU then she will be ready to be discharged.

## 2019-10-24 NOTE — PLAN OF CARE
Problem: Adult Inpatient Plan of Care  Goal: Patient-Specific Goal (Individualization)  Outcome: Ongoing, Progressing     Problem: Infection  Goal: Infection Symptom Resolution  Outcome: Ongoing, Progressing     Problem:  Fall Injury Risk  Goal: Absence of Fall, Infant Drop and Related Injury  Outcome: Ongoing, Progressing     Problem: Breastfeeding  Goal: Effective Breastfeeding  Outcome: Ongoing, Progressing     Problem: Postoperative Urinary Retention (Postpartum  Delivery)  Goal: Effective Urinary Elimination  Outcome: Ongoing, Progressing     Pt VS remained stable during that shift and had c/o abdominal pain that was almost relieved w/ pain med. Pt stated she had no vaginal bleeding this shift and have been ambulatory throughout shift.

## 2019-10-24 NOTE — DISCHARGE INSTRUCTIONS
After a Cesearean Birth    General Discharge Instructions  · May follow a regular diet, unless otherwise discussed with physician.    · Take showers, not baths unless otherwise discussed with physician.    · Activity as tolerated.    · No lifting or heavy exercise for 6 weeks, no driving for 2 weeks, no sexual   intercourse, douching or tampons for 6 weeks    · May return to work/school as discussed with physician    · Discuss birth control with physician    · Take medications as directed    · Breast care support bra worn at all times    · Lactation consultant referral number ( [phone] or [phone])    Call Your Healthcare Provider Right Away If You Have:  · A temperature of 100.4°F or higher.  · If your blood pressure is over 155/105.  · You have difficulty catching your breath or trouble breathing.  · Heavy vaginal bleeding, clots, or vaginal discharge with foul odor. (heavier than menses)  · Persistent nausea or vomiting.  · You gain more than 3 pounds in 3 days.  · Severe headaches not relieved by Tylenol (acetaminophen) or Motrin (ibuprofen)  · Blurry or double vision, see spots or flashing lights.  · Dizziness or fainting.  · New onset swelling or worsening of existing swelling.  · Burning or pain when you urinate.  · No bowel movement for 5 days.  · Redness, warmth, swelling, or pain in the lower leg.  · Redness, discharge, or pain worse than you had in the hospital.  · Burning, pain, red streaks, or lumpy areas in your breasts.  · Cracks, blisters, or blood on your nipples.  · Feelings of extreme sadness or anxiety, or a feeling that you dont want to be with your baby.  · If you have any new or unusual symptoms or have questions or concerns    Some of these symptoms can occur up to 4 to 6 weeks after delivery. This can be a sign of pre-eclampsia, which is a serious disease that can cause stroke, seizures, organ damage, or death. Do not wait to call your doctor or seek medical  attention.          Incision Care  · If you have an Aquacel dressing, then it stays in place for 1 week. It is waterproof and will be removed at your post-op visit. If it comes off before then, call your physician and keep the incision clean with warm soapy water and pat dry.  · Watch your incision for signs of infection, such as increasing redness or drainage, or a foul smelling odor.  · For ease of movement, hold a pillow against the incision when you get up from a lying or sitting position, and when you laugh or cough.  · Avoid heavy lifting--nothing heavier than your baby until your doctor instructs you otherwise.     Follow-Up  Schedule a  follow-up exam with your healthcare provider for about 1 week after delivery. During this exam, your uterus and vaginal area will be checked. Contact your healthcare provider if you think you or your baby are having any problems.
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
## 2019-10-24 NOTE — CONSULTS
JOSSY spoke with pt briefly, will call her at [phone] to complete baby assessment, pt room filled with visitors.

## 2019-10-25 NOTE — ANESTHESIA POSTPROCEDURE EVALUATION
Anesthesia Post Evaluation    Patient: Carolyn Pearson    Procedure(s) Performed: Procedure(s) (LRB):   SECTION (N/A)    Final Anesthesia Type: spinal  Patient location during evaluation: labor & delivery  Patient participation: Yes- Able to Participate  Level of consciousness: awake and alert and oriented  Post-procedure vital signs: reviewed and stable  Pain management: adequate  Airway patency: patent  PONV status at discharge: No PONV  Anesthetic complications: no      Cardiovascular status: blood pressure returned to baseline, stable and hemodynamically stable  Respiratory status: unassisted, room air and spontaneous ventilation  Hydration status: euvolemic  Follow-up not needed.          Vitals Value Taken Time   /78 10/24/2019 11:04 AM   Temp 36.7 °C (98 °F) 10/24/2019 11:04 AM   Pulse 79 10/24/2019 11:04 AM   Resp 18 10/24/2019 11:04 AM   SpO2 100 % 10/24/2019  7:34 AM         No case tracking events are documented in the log.      Pain/Edith Score: Pain Rating Prior to Med Admin: 10 (10/24/2019  7:53 AM)  Pain Rating Post Med Admin: 0 (10/24/2019  8:53 AM)

## 2019-10-31 ENCOUNTER — POSTPARTUM VISIT (OUTPATIENT)
Dept: OBSTETRICS AND GYNECOLOGY | Facility: CLINIC | Age: 29
End: 2019-10-31
Payer: MEDICAID

## 2019-10-31 VITALS
BODY MASS INDEX: 32.38 KG/M2 | DIASTOLIC BLOOD PRESSURE: 80 MMHG | SYSTOLIC BLOOD PRESSURE: 120 MMHG | WEIGHT: 189.69 LBS | HEIGHT: 64 IN

## 2019-10-31 PROBLEM — O34.219 HISTORY OF CESAREAN DELIVERY AFFECTING PREGNANCY: Status: RESOLVED | Noted: 2019-03-06 | Resolved: 2019-10-31

## 2019-10-31 PROBLEM — O99.331 TOBACCO SMOKING AFFECTING PREGNANCY IN FIRST TRIMESTER: Status: RESOLVED | Noted: 2019-03-06 | Resolved: 2019-10-31

## 2019-10-31 PROBLEM — Z3A.39 39 WEEKS GESTATION OF PREGNANCY: Status: RESOLVED | Noted: 2019-10-22 | Resolved: 2019-10-31

## 2019-10-31 PROBLEM — O99.019 ANEMIA DURING PREGNANCY: Status: RESOLVED | Noted: 2019-10-24 | Resolved: 2019-10-31

## 2019-10-31 PROCEDURE — 59430 PR CARE AFTER DELIVERY ONLY: ICD-10-PCS | Mod: ,,, | Performed by: OBSTETRICS & GYNECOLOGY

## 2019-10-31 PROCEDURE — 99999 PR PBB SHADOW E&M-EST. PATIENT-LVL III: ICD-10-PCS | Mod: PBBFAC,,, | Performed by: OBSTETRICS & GYNECOLOGY

## 2019-10-31 PROCEDURE — 99213 OFFICE O/P EST LOW 20 MIN: CPT | Mod: PBBFAC | Performed by: OBSTETRICS & GYNECOLOGY

## 2019-10-31 PROCEDURE — 99999 PR PBB SHADOW E&M-EST. PATIENT-LVL III: CPT | Mod: PBBFAC,,, | Performed by: OBSTETRICS & GYNECOLOGY
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
## 2019-12-05 ENCOUNTER — POSTPARTUM VISIT (OUTPATIENT)
Dept: OBSTETRICS AND GYNECOLOGY | Facility: CLINIC | Age: 29
End: 2019-12-05
Payer: MEDICAID

## 2019-12-05 VITALS
DIASTOLIC BLOOD PRESSURE: 74 MMHG | BODY MASS INDEX: 31.45 KG/M2 | SYSTOLIC BLOOD PRESSURE: 114 MMHG | WEIGHT: 184.19 LBS | HEIGHT: 64 IN

## 2019-12-05 PROCEDURE — 99213 OFFICE O/P EST LOW 20 MIN: CPT | Mod: PBBFAC | Performed by: OBSTETRICS & GYNECOLOGY

## 2019-12-05 PROCEDURE — 99999 PR PBB SHADOW E&M-EST. PATIENT-LVL III: ICD-10-PCS | Mod: PBBFAC,,, | Performed by: OBSTETRICS & GYNECOLOGY

## 2019-12-05 PROCEDURE — 99499 NO LOS: ICD-10-PCS | Mod: S$PBB,,, | Performed by: OBSTETRICS & GYNECOLOGY

## 2019-12-05 PROCEDURE — 99999 PR PBB SHADOW E&M-EST. PATIENT-LVL III: CPT | Mod: PBBFAC,,, | Performed by: OBSTETRICS & GYNECOLOGY

## 2019-12-05 PROCEDURE — 99499 UNLISTED E&M SERVICE: CPT | Mod: S$PBB,,, | Performed by: OBSTETRICS & GYNECOLOGY
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
## 2019-12-13 ENCOUNTER — TELEPHONE (OUTPATIENT)
Dept: OBSTETRICS AND GYNECOLOGY | Facility: CLINIC | Age: 29
End: 2019-12-13

## 2019-12-13 NOTE — TELEPHONE ENCOUNTER
----- Message from Sonya Marie sent at 12/13/2019  9:37 AM CST -----  Contact: Self  Type: Patient Call Back    Who called: [facility]    What is the request in detail: trying to schedule a delivery    Can the clinic reply by [facility]?Call     Would the patient rather a call back or a response via [facility]?  Call   Best call back number: [phone] ext [phone]    Patient is aware that her device is being shipped and we will contact her to schedule appt once the device arrives

## 2019-12-17 ENCOUNTER — TELEPHONE (OUTPATIENT)
Dept: OBSTETRICS AND GYNECOLOGY | Facility: CLINIC | Age: 29
End: 2019-12-17

## 2019-12-23 ENCOUNTER — PATIENT MESSAGE (OUTPATIENT)
Dept: OBSTETRICS AND GYNECOLOGY | Facility: CLINIC | Age: 29
End: 2019-12-23

## 2020-07-27 ENCOUNTER — OFFICE VISIT (OUTPATIENT)
Dept: OBSTETRICS AND GYNECOLOGY | Facility: CLINIC | Age: 30
End: 2020-07-27
Payer: MEDICAID

## 2020-07-27 VITALS
BODY MASS INDEX: 27.85 KG/M2 | WEIGHT: 163.13 LBS | DIASTOLIC BLOOD PRESSURE: 82 MMHG | HEIGHT: 64 IN | SYSTOLIC BLOOD PRESSURE: 128 MMHG

## 2020-07-27 DIAGNOSIS — Z12.4 CERVICAL CANCER SCREENING: ICD-10-CM

## 2020-07-27 DIAGNOSIS — Z01.419 WELL WOMAN EXAM WITH ROUTINE GYNECOLOGICAL EXAM: Primary | ICD-10-CM

## 2020-07-27 PROCEDURE — 99999 PR PBB SHADOW E&M-EST. PATIENT-LVL III: ICD-10-PCS | Mod: PBBFAC,,, | Performed by: OBSTETRICS & GYNECOLOGY

## 2020-07-27 PROCEDURE — 99395 PREV VISIT EST AGE 18-39: CPT | Mod: S$PBB,,, | Performed by: OBSTETRICS & GYNECOLOGY

## 2020-07-27 PROCEDURE — 99395 PR PREVENTIVE VISIT,EST,18-39: ICD-10-PCS | Mod: S$PBB,,, | Performed by: OBSTETRICS & GYNECOLOGY

## 2020-07-27 PROCEDURE — 99213 OFFICE O/P EST LOW 20 MIN: CPT | Mod: PBBFAC | Performed by: OBSTETRICS & GYNECOLOGY

## 2020-07-27 PROCEDURE — 99999 PR PBB SHADOW E&M-EST. PATIENT-LVL III: CPT | Mod: PBBFAC,,, | Performed by: OBSTETRICS & GYNECOLOGY

## 2020-07-27 PROCEDURE — 88175 CYTOPATH C/V AUTO FLUID REDO: CPT

## 2020-07-27 NOTE — PROGRESS NOTES
"[facility]  Ambulatory Clinic  Obstetrics & Gynecology    Visit Date:  2020    Chief Complaint:  Annual GYN exam    History of Present Illness:      Carolyn Pearson is a 29 y.o.  here for a gynecologic exam.    Pt has no major complaints today.      Menses are regular, not heavy or painful.    Pt current method of family planning is natural family planning.  Pt is requesting copper IUD.      Pt denies adverse reaction to hormonal contraception.    Pt denies h/o abnormal pap, last pap ~2017 years ago per pt.    Pt denies active sexually transmitted infections.    Pt performs monthly self breast examination, former smoker, uses seat belts, and denies abuse.     Pt denies abnormal vaginal bleeding, vaginal discharge, dysmenorrhea, dyspareunia, pelvic pain, bloating, early satiety, unintentional weight loss, breast mass/skin changes, incontinence, GI or urinary complaints.      Otherwise, the pt is in her usual state of health.    Past History:  Gynecologic history as noted above.    Review of Systems:      GENERAL:  No fever, fatigue, excessive weight gain or loss  HEENT:  No headaches, hearing changes, visual disturbance  RESPIRATORY:  No cough, shortness of breath  CARDIOVASCULAR:  No chest pain, heart palpitations, leg swelling  BREAST:  No lump, pain, nipple discharge, skin changes  GASTROINTESTINAL:  No nausea, vomiting, constipation, diarrhea, abd pain, rectal bleeding   GENITOURINARY:  See HPI  ENDOCRINE:  No heat or cold intolerance  HEMATOLOGIC:  No easy bruisability or bleeding   LYMPHATICS:  No enlarged nodes  MUSCULOSKELETAL:  No acute joint pain or swelling  SKIN:  No rash, lesions, jaundice  NEUROLOGIC:  No dizziness, weakness, syncope  PSYCHIATRIC:  No significant mood changes, homicidal/suicidal ideations    Physical Exam:     /82 (BP Location: Right arm, Patient Position: Sitting)   Ht 5' 4" (1.626 m)   Wt 74 kg (163 lb 2.3 oz)   LMP 2020 (Approximate)  "  BMI 28.00 kg/m²   Pulse 70, Resp rate 16     GENERAL:  No acute distress, well-nourished  HEENT:  Atraumatic, anicteric, moist mucus membranes. Neck supple w/o masses.  BREAST:  Symmetric, nontender, no obvious masses, adenopathy, skin changes or nipple discharge.  LUNGS:  Clear to auscultation  HEART:  Regular rate and rhythm, no murmurs, gallops, or rubs  ABDOMEN:  Soft, non-tender, non-distended, normoactive bowel sounds, no obvious organomegaly  EXT:  Symmetric w/o cramping, claudication, or edema. +2 distal pulses.  SKIN:  No rashes or bruising  PSYCH:  Mood and affect appropriate  NEURO:  Grossly intact bilaterally    GENITOURINARY:    VULVAR:  Female external genitalia w/o obvious lesions. Female hair distribution. Normal urethral meatus. No gross lymphadenopathy.    VAGINA:  Pink, moist, well-rugated. Good support. No obvious lesion. No discharge.  CERVIX:  No cervical motion tenderness, discharge, or obvious lesions.   UTERUS:  Small, non-tender, normal contour  ADNEXA:  No masses, non-tender    RECTAL:  Declined. No obvious external lesions    Chaperone present for exam.    Assessment:     29 y.o. :    1. Well woman gynecologic exam  2. Family planning - order copper IUD    Plan:    A gynecologic health assessment was performed with age appropriate counseling.    Cervical cancer screening - pap obtained.    STI screening - pt declined.  Safe sex discussed.      We discuss her contraceptive options.  Pt is requesting Copper IUD.  Risks, benefits, and alternatives to IUD reviewed.  Will schedule pt for IUD insertion on received pending insurance verification.  In meantime time, pt will use condoms.      Encourage healthy lifestyle modifications, monthly self breast exams, encourage HPV vaccination, Ca/Vit D.    F/u with PCP for health maintenance.    Pt will call clinic to schedule her IUD insertion within 7 days at start of her next cycle once IUD received.    Return sooner as needed.  All  questions answered, pt voiced understanding.        Andrew Barrera MD

## 2020-08-11 LAB
FINAL PATHOLOGIC DIAGNOSIS: NORMAL
Lab: NORMAL

## 2021-03-26 ENCOUNTER — TELEPHONE (OUTPATIENT)
Dept: OBSTETRICS AND GYNECOLOGY | Facility: CLINIC | Age: 31
End: 2021-03-26

## 2021-04-09 ENCOUNTER — TELEPHONE (OUTPATIENT)
Dept: OBSTETRICS AND GYNECOLOGY | Facility: CLINIC | Age: 31
End: 2021-04-09

## 2021-04-30 ENCOUNTER — OFFICE VISIT (OUTPATIENT)
Dept: OBSTETRICS AND GYNECOLOGY | Facility: CLINIC | Age: 31
End: 2021-04-30
Payer: MEDICAID

## 2021-04-30 VITALS
BODY MASS INDEX: 27.23 KG/M2 | WEIGHT: 159.5 LBS | SYSTOLIC BLOOD PRESSURE: 118 MMHG | HEIGHT: 64 IN | DIASTOLIC BLOOD PRESSURE: 72 MMHG

## 2021-04-30 DIAGNOSIS — R10.2 PELVIC PAIN IN FEMALE: Primary | ICD-10-CM

## 2021-04-30 DIAGNOSIS — Z11.3 SCREEN FOR STD (SEXUALLY TRANSMITTED DISEASE): ICD-10-CM

## 2021-04-30 DIAGNOSIS — N94.6 DYSMENORRHEA: ICD-10-CM

## 2021-04-30 PROCEDURE — 87491 CHLMYD TRACH DNA AMP PROBE: CPT | Performed by: OBSTETRICS & GYNECOLOGY

## 2021-04-30 PROCEDURE — 99999 PR PBB SHADOW E&M-EST. PATIENT-LVL III: CPT | Mod: PBBFAC,,, | Performed by: OBSTETRICS & GYNECOLOGY

## 2021-04-30 PROCEDURE — 99213 PR OFFICE/OUTPT VISIT, EST, LEVL III, 20-29 MIN: ICD-10-PCS | Mod: S$PBB,,, | Performed by: OBSTETRICS & GYNECOLOGY

## 2021-04-30 PROCEDURE — 99213 OFFICE O/P EST LOW 20 MIN: CPT | Mod: S$PBB,,, | Performed by: OBSTETRICS & GYNECOLOGY

## 2021-04-30 PROCEDURE — 87591 N.GONORRHOEAE DNA AMP PROB: CPT | Performed by: OBSTETRICS & GYNECOLOGY

## 2021-04-30 PROCEDURE — 99999 PR PBB SHADOW E&M-EST. PATIENT-LVL III: ICD-10-PCS | Mod: PBBFAC,,, | Performed by: OBSTETRICS & GYNECOLOGY

## 2021-04-30 PROCEDURE — 99213 OFFICE O/P EST LOW 20 MIN: CPT | Mod: PBBFAC | Performed by: OBSTETRICS & GYNECOLOGY

## 2021-05-03 LAB
C TRACH DNA SPEC QL NAA+PROBE: NOT DETECTED
N GONORRHOEA DNA SPEC QL NAA+PROBE: NOT DETECTED

## 2021-05-04 ENCOUNTER — TELEPHONE (OUTPATIENT)
Dept: OBSTETRICS AND GYNECOLOGY | Facility: CLINIC | Age: 31
End: 2021-05-04

## 2021-07-20 NOTE — TELEPHONE ENCOUNTER
Spoke to patient regarding medication request. Medication sent to pharmacy    
Spoke to pt regarding medication request. Sent to provider  
non-verbal indicators of pain/discomfort absent

## 2021-08-07 ENCOUNTER — HOSPITAL ENCOUNTER (EMERGENCY)
Facility: HOSPITAL | Age: 31
Discharge: HOME | End: 2021-08-07
Attending: EMERGENCY MEDICINE
Payer: MEDICAID

## 2021-08-07 VITALS
TEMPERATURE: 99 F | SYSTOLIC BLOOD PRESSURE: 120 MMHG | RESPIRATION RATE: 20 BRPM | BODY MASS INDEX: 27.6 KG/M2 | HEART RATE: 75 BPM | WEIGHT: 150 LBS | HEIGHT: 62 IN | OXYGEN SATURATION: 100 % | DIASTOLIC BLOOD PRESSURE: 64 MMHG

## 2021-08-07 DIAGNOSIS — R05.9 COUGH: ICD-10-CM

## 2021-08-07 DIAGNOSIS — B34.9 VIRAL SYNDROME: Primary | ICD-10-CM

## 2021-08-07 DIAGNOSIS — Z20.822 LAB TEST NEGATIVE FOR COVID-19 VIRUS: ICD-10-CM

## 2021-08-07 LAB
CTP QC/QA: YES
SARS-COV-2 RDRP RESP QL NAA+PROBE: NEGATIVE

## 2021-08-07 PROCEDURE — U0002 COVID-19 LAB TEST NON-CDC: HCPCS | Performed by: NURSE PRACTITIONER

## 2021-08-07 PROCEDURE — 99282 EMERGENCY DEPT VISIT SF MDM: CPT | Mod: 25

## 2021-08-07 RX ORDER — TRAMADOL HYDROCHLORIDE 50 MG/1
50 TABLET ORAL DAILY PRN
COMMUNITY
Start: 2021-07-29

## 2021-08-07 RX ORDER — IBUPROFEN 800 MG/1
800 TABLET ORAL DAILY PRN
COMMUNITY
Start: 2021-07-29

## 2021-12-21 ENCOUNTER — HOSPITAL ENCOUNTER (EMERGENCY)
Facility: HOSPITAL | Age: 31
Discharge: HOME | End: 2021-12-21
Attending: EMERGENCY MEDICINE
Payer: MEDICAID

## 2021-12-21 VITALS
HEART RATE: 64 BPM | HEIGHT: 63 IN | BODY MASS INDEX: 28.7 KG/M2 | OXYGEN SATURATION: 100 % | SYSTOLIC BLOOD PRESSURE: 112 MMHG | RESPIRATION RATE: 18 BRPM | WEIGHT: 162 LBS | TEMPERATURE: 98 F | DIASTOLIC BLOOD PRESSURE: 65 MMHG

## 2021-12-21 DIAGNOSIS — S63.502A SPRAIN OF LEFT WRIST, INITIAL ENCOUNTER: ICD-10-CM
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
## 2021-12-21 DIAGNOSIS — S00.03XA CONTUSION OF SCALP, INITIAL ENCOUNTER: ICD-10-CM

## 2021-12-21 DIAGNOSIS — S00.83XA CONTUSION OF FACE, INITIAL ENCOUNTER: ICD-10-CM

## 2021-12-21 DIAGNOSIS — M25.512 ACUTE PAIN OF LEFT SHOULDER: ICD-10-CM

## 2021-12-21 DIAGNOSIS — M54.50 ACUTE RIGHT-SIDED LOW BACK PAIN WITHOUT SCIATICA: ICD-10-CM

## 2021-12-21 LAB
B-HCG UR QL: NEGATIVE
CTP QC/QA: YES

## 2021-12-21 PROCEDURE — 99284 EMERGENCY DEPT VISIT MOD MDM: CPT | Mod: 25

## 2021-12-21 PROCEDURE — 25000003 PHARM REV CODE 250: Performed by: PHYSICIAN ASSISTANT

## 2021-12-21 PROCEDURE — 81025 URINE PREGNANCY TEST: CPT | Performed by: EMERGENCY MEDICINE

## 2021-12-21 RX ORDER — TETRACAINE HYDROCHLORIDE 5 MG/ML
2 SOLUTION OPHTHALMIC
Status: COMPLETED | OUTPATIENT
Start: 2021-12-21 | End: 2021-12-21

## 2021-12-21 RX ORDER — ACETAMINOPHEN 325 MG/1
650 TABLET ORAL
Status: COMPLETED | OUTPATIENT
Start: 2021-12-21 | End: 2021-12-21

## 2021-12-21 RX ORDER — METHOCARBAMOL 500 MG/1
1000 TABLET, FILM COATED ORAL 3 TIMES DAILY PRN
Qty: 20 TABLET | Refills: 0 | Status: SHIPPED | OUTPATIENT
Start: 2021-12-21 | End: 2021-12-26

## 2021-12-21 RX ADMIN — FLUORESCEIN SODIUM 1 EACH: 1 STRIP OPHTHALMIC at 01:12

## 2021-12-21 RX ADMIN — ACETAMINOPHEN 650 MG: 325 TABLET ORAL at 01:12

## 2021-12-21 RX ADMIN — TETRACAINE HYDROCHLORIDE 2 DROP: 5 SOLUTION OPHTHALMIC at 01:12

## 2022-08-10 ENCOUNTER — HOSPITAL ENCOUNTER (OUTPATIENT)
Dept: RADIOLOGY | Facility: HOSPITAL | Age: 32
Discharge: HOME | End: 2022-08-10
Attending: FAMILY MEDICINE
Payer: MEDICAID

## 2022-08-10 DIAGNOSIS — Z01.818 OTHER SPECIFIED PRE-OPERATIVE EXAMINATION: ICD-10-CM

## 2022-08-10 PROCEDURE — 71046 X-RAY EXAM CHEST 2 VIEWS: CPT | Mod: TC,FY

## 2022-08-10 PROCEDURE — 71046 XR CHEST PA AND LATERAL: ICD-10-PCS | Mod: 26,,, | Performed by: RADIOLOGY

## 2022-08-10 PROCEDURE — 71046 X-RAY EXAM CHEST 2 VIEWS: CPT | Mod: 26,,, | Performed by: RADIOLOGY

## 2022-09-07 ENCOUNTER — HOSPITAL ENCOUNTER (EMERGENCY)
Facility: HOSPITAL | Age: 32
Discharge: HOME | End: 2022-09-07
Attending: EMERGENCY MEDICINE
Payer: MEDICAID

## 2022-09-07 VITALS
HEART RATE: 72 BPM | DIASTOLIC BLOOD PRESSURE: 73 MMHG | SYSTOLIC BLOOD PRESSURE: 116 MMHG | BODY MASS INDEX: 30.47 KG/M2 | TEMPERATURE: 98 F | OXYGEN SATURATION: 98 % | WEIGHT: 172 LBS | RESPIRATION RATE: 18 BRPM

## 2022-09-07 DIAGNOSIS — R53.1 GENERALIZED WEAKNESS: Primary | ICD-10-CM

## 2022-09-07 DIAGNOSIS — Z98.890 STATUS POST ABDOMINOPLASTY: ICD-10-CM

## 2022-09-07 DIAGNOSIS — D64.9 ANEMIA, UNSPECIFIED TYPE: ICD-10-CM

## 2022-09-07 LAB
ALBUMIN SERPL BCP-MCNC: 3.3 G/DL (ref 3.5–5.2)
ALP SERPL-CCNC: 44 U/L (ref 55–135)
ALT SERPL W/O P-5'-P-CCNC: 20 U/L (ref 10–44)
ANION GAP SERPL CALC-SCNC: 6 MMOL/L (ref 8–16)
AST SERPL-CCNC: 19 U/L (ref 10–40)
BASOPHILS # BLD AUTO: 0.02 K/UL (ref 0–0.2)
BASOPHILS NFR BLD: 0.5 % (ref 0–1.9)
BILIRUB SERPL-MCNC: 0.6 MG/DL (ref 0.1–1)
BUN SERPL-MCNC: 7 MG/DL (ref 6–20)
CALCIUM SERPL-MCNC: 8.9 MG/DL (ref 8.7–10.5)
CHLORIDE SERPL-SCNC: 105 MMOL/L (ref 95–110)
CO2 SERPL-SCNC: 27 MMOL/L (ref 23–29)
CREAT SERPL-MCNC: 0.7 MG/DL (ref 0.5–1.4)
DIFFERENTIAL METHOD: ABNORMAL
EOSINOPHIL # BLD AUTO: 0.1 K/UL (ref 0–0.5)
EOSINOPHIL NFR BLD: 1.8 % (ref 0–8)
ERYTHROCYTE [DISTWIDTH] IN BLOOD BY AUTOMATED COUNT: 14 % (ref 11.5–14.5)
EST. GFR  (NO RACE VARIABLE): >60 ML/MIN/1.73 M^2
GLUCOSE SERPL-MCNC: 104 MG/DL (ref 70–110)
HCT VFR BLD AUTO: 31.4 % (ref 37–48.5)
HGB BLD-MCNC: 10.2 G/DL (ref 12–16)
IMM GRANULOCYTES # BLD AUTO: 0.02 K/UL (ref 0–0.04)
IMM GRANULOCYTES NFR BLD AUTO: 0.5 % (ref 0–0.5)
LYMPHOCYTES # BLD AUTO: 1.1 K/UL (ref 1–4.8)
LYMPHOCYTES NFR BLD: 24.1 % (ref 18–48)
MCH RBC QN AUTO: 28.9 PG (ref 27–31)
MCHC RBC AUTO-ENTMCNC: 32.5 G/DL (ref 32–36)
MCV RBC AUTO: 89 FL (ref 82–98)
MONOCYTES # BLD AUTO: 0.4 K/UL (ref 0.3–1)
MONOCYTES NFR BLD: 9.2 % (ref 4–15)
NEUTROPHILS # BLD AUTO: 2.8 K/UL (ref 1.8–7.7)
NEUTROPHILS NFR BLD: 63.9 % (ref 38–73)
NRBC BLD-RTO: 0 /100 WBC
PLATELET # BLD AUTO: 285 K/UL (ref 150–450)
PMV BLD AUTO: 11 FL (ref 9.2–12.9)
POTASSIUM SERPL-SCNC: 4.6 MMOL/L (ref 3.5–5.1)
PROT SERPL-MCNC: 6.4 G/DL (ref 6–8.4)
RBC # BLD AUTO: 3.53 M/UL (ref 4–5.4)
SODIUM SERPL-SCNC: 138 MMOL/L (ref 136–145)
WBC # BLD AUTO: 4.36 K/UL (ref 3.9–12.7)

## 2022-09-07 PROCEDURE — 93010 EKG 12-LEAD: ICD-10-PCS | Mod: ,,, | Performed by: INTERNAL MEDICINE

## 2022-09-07 PROCEDURE — 93010 ELECTROCARDIOGRAM REPORT: CPT | Mod: ,,, | Performed by: INTERNAL MEDICINE

## 2022-09-07 PROCEDURE — 99285 EMERGENCY DEPT VISIT HI MDM: CPT | Mod: 25

## 2022-09-07 PROCEDURE — 93005 ELECTROCARDIOGRAM TRACING: CPT

## 2022-09-07 PROCEDURE — 80053 COMPREHEN METABOLIC PANEL: CPT | Performed by: EMERGENCY MEDICINE

## 2022-09-07 PROCEDURE — 85025 COMPLETE CBC W/AUTO DIFF WBC: CPT | Performed by: EMERGENCY MEDICINE

## 2022-09-07 RX ORDER — METHOCARBAMOL 500 MG/1
500 TABLET, FILM COATED ORAL 4 TIMES DAILY
COMMUNITY

## 2022-09-07 RX ORDER — BUTALBITAL, ACETAMINOPHEN AND CAFFEINE 50; 325; 40 MG/1; MG/1; MG/1
1 TABLET ORAL EVERY 4 HOURS PRN
Qty: 20 TABLET | Refills: 0 | Status: SHIPPED | OUTPATIENT
Start: 2022-09-07 | End: 2022-10-07

## 2022-09-07 RX ORDER — CEPHALEXIN 250 MG/1
250 CAPSULE ORAL EVERY 6 HOURS
COMMUNITY

## 2022-09-07 RX ORDER — BUTALBITAL, ACETAMINOPHEN AND CAFFEINE 50; 325; 40 MG/1; MG/1; MG/1
2 TABLET ORAL
Status: DISCONTINUED | OUTPATIENT
Start: 2022-09-07 | End: 2022-09-07 | Stop reason: HOSPADM

## 2022-09-07 RX ORDER — HYDROCODONE BITARTRATE AND ACETAMINOPHEN 7.5; 325 MG/1; MG/1
1 TABLET ORAL 4 TIMES DAILY PRN
COMMUNITY
Start: 2022-09-03

## 2022-09-07 NOTE — DISCHARGE INSTRUCTIONS
Please return immediately if you get worse or if new problems develop.  Continue medicines as directed.  Please follow-up with your primary care doctor, the primary care doctor above, or the clinic above this week.

## 2022-09-07 NOTE — ED TRIAGE NOTES
"Pt to the ED with complaints of right frontal headache, mild shortness of breath, amd generalized weakness since yesterday. Pt states she had 360 lipo surgery 8 days ago and is now wearing a full abdominal compression, which she thinks may be causing her shortness of breath. Pt states, "I think I am just dehydrated".  "

## 2022-09-07 NOTE — ED PROVIDER NOTES
"Encounter Date: 2022    SCRIBE #1 NOTE: I, Dex Gonzalez, am scribing for, and in the presence of,  Indra Linda MD.     History     Chief Complaint   Patient presents with    Headache     Headache, sob, generalized weakness, had liposuction 360 one week ago     Carolyn Pearson is a 31 y.o. female with a PMHx of HTN who presents to the Emergency Department for evaluation of an acute constant right frontal headache with associated generalized weakness and mild shortness of breath that began yesterday. Patient reports she underwent a liposuction 360 procedure 8 days ago. She explains her headache starts in her right medial orbital region and radiates to the right forehead. Patient describes her shortness of breath as "not feeling like she is breathing how she normally breaths" and "not breathing as frequently as usual". She states it is "hard to take a deep breath because her belly is swollen from her procedure". Patient reports she is wearing a body compression garment and compression socks. She endorses mild abdominal pain related to her liposuction procedure. She clarifies this pain is not worsening. Patient states her surgical wounds are healing well and denies any redness or discharge. She denies any head injury, chest tightness, cough, chest pain, fever, chills, nausea, vomiting, diarrhea, dysuria, congestion, sore throat, arm or leg trouble, eye pain, ear pain, rash, or any other associated symptoms. Patient denies any history of DVT or PE. She expresses no concerns for being pregnant.    The history is provided by the patient.   Review of patient's allergies indicates:  No Known Allergies  Past Medical History:   Diagnosis Date    Hypertension     Miscarriage      Past Surgical History:   Procedure Laterality Date     SECTION       SECTION N/A 10/22/2019    Procedure:  SECTION;  Surgeon: Andrew Barrera MD;  Location: [facility] L&D OR;  Service: OB/GYN;  Laterality: N/A;    " "FRACTURE SURGERY      left leg    LEG SURGERY      LIPOSUCTION  08/30/2022    360 liposuction     Family History   Problem Relation Age of Onset    Hypertension Mother     Hypertension Maternal Grandmother     Kidney disease Maternal Grandmother     Diabetes Maternal Grandmother     Diabetes Paternal Grandmother     Hypertension Paternal Grandmother     Kidney disease Paternal Grandmother     Breast cancer Neg Hx     Colon cancer Neg Hx     Ovarian cancer Neg Hx      Social History     Tobacco Use    Smoking status: Former     Packs/day: 0.50     Years: 6.00     Pack years: 3.00     Types: Cigarettes    Smokeless tobacco: Former     Quit date: 8/31/2014   Substance Use Topics    Alcohol use: Yes     Comment: "sometimes"    Drug use: No     Review of Systems   Constitutional:  Negative for chills and fever.   HENT:  Negative for congestion, ear pain and sore throat.    Eyes:  Negative for pain.   Respiratory:  Positive for shortness of breath. Negative for cough and chest tightness.    Gastrointestinal:  Positive for abdominal distention and abdominal pain. Negative for diarrhea, nausea and vomiting.   Genitourinary:  Negative for dysuria.   Skin:  Negative for color change and rash.   Neurological:  Positive for weakness and headaches.     Physical Exam     Initial Vitals [09/07/22 0848]   BP Pulse Resp Temp SpO2   113/70 75 20 98.1 °F (36.7 °C) 100 %      MAP       --         Physical Exam  The patient was examined specifically for the following:   General:No significant distress, Good color, Warm and dry. Head and neck:Scalp atraumatic, Neck supple. Neurological:Appropriate conversation, Gross motor deficits. Eyes:Conjugate gaze, Clear corneas. ENT: No epistaxis. Cardiac: Regular rate and rhythm, Grossly normal heart tones. Pulmonary: Wheezing, Rales. Gastrointestinal: Abdominal tenderness, Abdominal distention. Musculoskeletal: Extremity deformity, Apparent pain with range of motion of the joints. Skin: Rash. "   The findings on examination were normal except for the following:  The patient is wearing a compression garment on her abdomen and torso.  The lungs are clear and free of wheezing rales rubs or rhonchi.  Oxygen saturations are 100%.  There is no clinical evidence of respiratory distress.  The heart rate is 75 the respiratory rate is 20.  The abdomen is nontender.  Mental status examination, cranial nerves, motor and sensory examination are normal.  Patient has some mild tenderness on the right side of her nose and the right orbital rim.  There is no swelling erythema warmth ecchymosis impaired extraocular movements.  The neck is supple.  ED Course   Procedures  Labs Reviewed   COMPREHENSIVE METABOLIC PANEL - Abnormal; Notable for the following components:       Result Value    Albumin 3.3 (*)     Alkaline Phosphatase 44 (*)     Anion Gap 6 (*)     All other components within normal limits   CBC W/ AUTO DIFFERENTIAL - Abnormal; Notable for the following components:    RBC 3.53 (*)     Hemoglobin 10.2 (*)     Hematocrit 31.4 (*)     All other components within normal limits     EKG Readings: (Independently Interpreted)   This patient is in a sinus bradycardia with a heart rate of 57.  There are no significant ST segment or T-wave changes.  There is no definite evidence of acute myocardial infarction or malignant arrhythmia.   ECG Results              EKG 12-lead (Final result)  Result time 09/07/22 16:35:00      Final result by Interface, Lab In [facility] (09/07/22 16:35:00)                   Narrative:    Test Reason : R06.02,    Vent. Rate : 057 BPM     Atrial Rate : 057 BPM     P-R Int : 128 ms          QRS Dur : 076 ms      QT Int : 438 ms       P-R-T Axes : 069 076 026 degrees     QTc Int : 426 ms    Sinus bradycardia  Otherwise normal ECG  When compared with ECG of 11-MAR-2019 08:28,  Significant changes have occurred  Confirmed by Garcia Garcia MD ([id number]) on 9/7/2022 4:34:48 PM    Referred By: [email or web address]    SELF           Confirmed By:Garcia Garcia MD                                  Imaging Results              X-Ray Chest AP Portable (Final result)  Result time 09/07/22 10:39:48      Final result by Gerson Bruno MD (09/07/22 10:39:48)                   Impression:      1. No acute cardiopulmonary process appreciated.      Electronically signed by: Gerson Bruno  Date:    09/07/2022  Time:    10:39               Narrative:    EXAMINATION:  XR CHEST AP PORTABLE    CLINICAL HISTORY:  chest pain;    TECHNIQUE:  Single frontal portable view of the chest was performed.    COMPARISON:  Chest radiograph 08/10/2022    FINDINGS:  Cardiomediastinal silhouette is within normal limits.    No focal consolidation, overt interstitial edema, sizable pleural effusion or pneumothorax.    Visualized osseous structures are grossly unremarkable.                                       Medications - No data to display    Medical Decision Making:   History:   Old Medical Records: I decided to obtain old medical records.  Clinical Tests:   Lab Tests: Reviewed and Ordered  Radiological Study: Ordered and Reviewed  Medical Tests: Reviewed and Ordered     Given the above, this patient presents emergency room complaining of generalized weakness.  She has vague respiratory symptoms.  There is no history of air hunger.  Patient is not hypoxic tachycardic or tachypneic.  I have carefully considered pulmonary embolus.  I feel this is very unlikely.  Chest x-ray fails to reveal pneumothorax pneumonia pleural effusion.  EKG fails to reveal evidence of any acute ischemia.  Renal function is unremarkable.  The patient has had a mild anemia.  Her hemoglobin has been lower in the past.  I examined the patient's abdomen wounds in back.  There is some ecchymosis of the skin but no erythema warmth or evidence of significant infection.       Scribe Attestation:   Scribe #1: I performed the above scribed service and the documentation accurately describes the  services I performed. I attest to the accuracy of the note.               Clinical Impression:   Final diagnoses:  [R53.1] Generalized weakness (Primary)  [D64.9] Anemia, unspecified type  [Z98.890] Status post abdominoplasty      ED Disposition Condition    Discharge Stable          ED Prescriptions       Medication Sig Dispense Start Date End Date Auth. Provider    butalbital-acetaminophen-caffeine -40 mg (FIORICET, ESGIC) -40 mg per tablet Take 1 tablet by mouth every 4 (four) hours as needed for Pain. 20 tablet 9/7/2022 10/7/2022 Indra Linda MD          Follow-up Information       Follow up With Specialties Details Why Contact Info    [facility] [place]  In 3 days  [address]  [place] [address]  [phone]      Art Perez MD Family Medicine In 3 days  [address] [facility]  [place] [id number] [phone]            I personally performed the services described in this documentation.  All medical record  entries made by the scribe are at my direction and in my presence.  Signed, Dr. Alexei Linda MD  09/07/22 [id number]

## 2022-10-10 ENCOUNTER — HOSPITAL ENCOUNTER (EMERGENCY)
Facility: HOSPITAL | Age: 32
Discharge: LEFT BEFORE DISCHARGE | End: 2022-10-10
Attending: EMERGENCY MEDICINE
Payer: MEDICAID

## 2022-10-10 VITALS
OXYGEN SATURATION: 99 % | SYSTOLIC BLOOD PRESSURE: 124 MMHG | HEART RATE: 89 BPM | DIASTOLIC BLOOD PRESSURE: 60 MMHG | TEMPERATURE: 98 F | BODY MASS INDEX: 29.19 KG/M2 | RESPIRATION RATE: 16 BRPM | WEIGHT: 171 LBS | HEIGHT: 64 IN

## 2022-10-10 DIAGNOSIS — B96.89 BACTERIAL VAGINOSIS: ICD-10-CM

## 2022-10-10 DIAGNOSIS — N39.0 URINARY TRACT INFECTION WITHOUT HEMATURIA, SITE UNSPECIFIED: ICD-10-CM

## 2022-10-10 DIAGNOSIS — N76.0 BACTERIAL VAGINOSIS: ICD-10-CM

## 2022-10-10 DIAGNOSIS — Z53.29 LEFT AGAINST MEDICAL ADVICE: Primary | ICD-10-CM

## 2022-10-10 LAB
ABO + RH BLD: NORMAL
ALBUMIN SERPL BCP-MCNC: 3.4 G/DL (ref 3.5–5.2)
ALP SERPL-CCNC: 45 U/L (ref 55–135)
ALT SERPL W/O P-5'-P-CCNC: 19 U/L (ref 10–44)
ANION GAP SERPL CALC-SCNC: 4 MMOL/L (ref 8–16)
AST SERPL-CCNC: 15 U/L (ref 10–40)
B-HCG UR QL: POSITIVE
BACTERIA #/AREA URNS HPF: ABNORMAL /HPF
BACTERIA GENITAL QL WET PREP: ABNORMAL
BASOPHILS # BLD AUTO: 0.01 K/UL (ref 0–0.2)
BASOPHILS NFR BLD: 0.2 % (ref 0–1.9)
BILIRUB SERPL-MCNC: 0.4 MG/DL (ref 0.1–1)
BILIRUB UR QL STRIP: NEGATIVE
BUN SERPL-MCNC: 7 MG/DL (ref 6–20)
CALCIUM SERPL-MCNC: 8.2 MG/DL (ref 8.7–10.5)
CHLORIDE SERPL-SCNC: 109 MMOL/L (ref 95–110)
CLARITY UR: ABNORMAL
CLUE CELLS VAG QL WET PREP: ABNORMAL
CO2 SERPL-SCNC: 26 MMOL/L (ref 23–29)
COLOR UR: YELLOW
CREAT SERPL-MCNC: 0.7 MG/DL (ref 0.5–1.4)
CTP QC/QA: YES
DIFFERENTIAL METHOD: NORMAL
EOSINOPHIL # BLD AUTO: 0 K/UL (ref 0–0.5)
EOSINOPHIL NFR BLD: 1 % (ref 0–8)
ERYTHROCYTE [DISTWIDTH] IN BLOOD BY AUTOMATED COUNT: 13.4 % (ref 11.5–14.5)
EST. GFR  (NO RACE VARIABLE): >60 ML/MIN/1.73 M^2
FILAMENT FUNGI VAG WET PREP-#/AREA: ABNORMAL
GLUCOSE SERPL-MCNC: 98 MG/DL (ref 70–110)
GLUCOSE UR QL STRIP: NEGATIVE
HCG INTACT+B SERPL-ACNC: 325 MIU/ML
HCT VFR BLD AUTO: 38.7 % (ref 37–48.5)
HGB BLD-MCNC: 12.5 G/DL (ref 12–16)
HGB UR QL STRIP: NEGATIVE
HYALINE CASTS #/AREA URNS LPF: 0 /LPF
IMM GRANULOCYTES # BLD AUTO: 0.01 K/UL (ref 0–0.04)
IMM GRANULOCYTES NFR BLD AUTO: 0.2 % (ref 0–0.5)
KETONES UR QL STRIP: ABNORMAL
LEUKOCYTE ESTERASE UR QL STRIP: ABNORMAL
LYMPHOCYTES # BLD AUTO: 1.1 K/UL (ref 1–4.8)
LYMPHOCYTES NFR BLD: 26.7 % (ref 18–48)
MCH RBC QN AUTO: 28.2 PG (ref 27–31)
MCHC RBC AUTO-ENTMCNC: 32.3 G/DL (ref 32–36)
MCV RBC AUTO: 87 FL (ref 82–98)
MICROSCOPIC COMMENT: ABNORMAL
MONOCYTES # BLD AUTO: 0.4 K/UL (ref 0.3–1)
MONOCYTES NFR BLD: 9.2 % (ref 4–15)
NEUTROPHILS # BLD AUTO: 2.5 K/UL (ref 1.8–7.7)
NEUTROPHILS NFR BLD: 62.7 % (ref 38–73)
NITRITE UR QL STRIP: NEGATIVE
NRBC BLD-RTO: 0 /100 WBC
PH UR STRIP: 6 [PH] (ref 5–8)
PLATELET # BLD AUTO: 223 K/UL (ref 150–450)
PMV BLD AUTO: 12.2 FL (ref 9.2–12.9)
POTASSIUM SERPL-SCNC: 3.5 MMOL/L (ref 3.5–5.1)
PROT SERPL-MCNC: 6.2 G/DL (ref 6–8.4)
PROT UR QL STRIP: ABNORMAL
RBC # BLD AUTO: 4.44 M/UL (ref 4–5.4)
RBC #/AREA URNS HPF: 3 /HPF (ref 0–4)
SODIUM SERPL-SCNC: 139 MMOL/L (ref 136–145)
SP GR UR STRIP: 1.03 (ref 1–1.03)
SPECIMEN SOURCE: ABNORMAL
SQUAMOUS #/AREA URNS HPF: 13 /HPF
T VAGINALIS GENITAL QL WET PREP: ABNORMAL
URN SPEC COLLECT METH UR: ABNORMAL
UROBILINOGEN UR STRIP-ACNC: NEGATIVE EU/DL
WBC # BLD AUTO: 4.01 K/UL (ref 3.9–12.7)
WBC #/AREA URNS HPF: >100 /HPF (ref 0–5)
WBC #/AREA VAG WET PREP: ABNORMAL
YEAST GENITAL QL WET PREP: ABNORMAL

## 2022-10-10 PROCEDURE — 81025 URINE PREGNANCY TEST: CPT | Performed by: EMERGENCY MEDICINE

## 2022-10-10 PROCEDURE — 84702 CHORIONIC GONADOTROPIN TEST: CPT | Performed by: EMERGENCY MEDICINE

## 2022-10-10 PROCEDURE — 85025 COMPLETE CBC W/AUTO DIFF WBC: CPT | Performed by: EMERGENCY MEDICINE

## 2022-10-10 PROCEDURE — 25000003 PHARM REV CODE 250: Performed by: EMERGENCY MEDICINE

## 2022-10-10 PROCEDURE — 87086 URINE CULTURE/COLONY COUNT: CPT | Performed by: EMERGENCY MEDICINE

## 2022-10-10 PROCEDURE — 99285 EMERGENCY DEPT VISIT HI MDM: CPT | Mod: 25

## 2022-10-10 PROCEDURE — 81000 URINALYSIS NONAUTO W/SCOPE: CPT | Performed by: EMERGENCY MEDICINE

## 2022-10-10 PROCEDURE — 87088 URINE BACTERIA CULTURE: CPT | Performed by: EMERGENCY MEDICINE

## 2022-10-10 PROCEDURE — 80053 COMPREHEN METABOLIC PANEL: CPT

## 2022-10-10 PROCEDURE — 25000003 PHARM REV CODE 250

## 2022-10-10 PROCEDURE — 87186 SC STD MICRODIL/AGAR DIL: CPT | Performed by: EMERGENCY MEDICINE

## 2022-10-10 PROCEDURE — 87210 SMEAR WET MOUNT SALINE/INK: CPT

## 2022-10-10 PROCEDURE — 87077 CULTURE AEROBIC IDENTIFY: CPT | Performed by: EMERGENCY MEDICINE

## 2022-10-10 PROCEDURE — 86901 BLOOD TYPING SEROLOGIC RH(D): CPT | Performed by: EMERGENCY MEDICINE

## 2022-10-10 RX ORDER — ACETAMINOPHEN 500 MG
500 TABLET ORAL
Status: COMPLETED | OUTPATIENT
Start: 2022-10-10 | End: 2022-10-10

## 2022-10-10 RX ADMIN — SODIUM CHLORIDE 1000 ML: 0.9 INJECTION, SOLUTION INTRAVENOUS at 03:10

## 2022-10-10 RX ADMIN — ACETAMINOPHEN 500 MG: 500 TABLET ORAL at 03:10

## 2022-10-10 NOTE — ED PROVIDER NOTES
Encounter Date: 10/10/2022       History     Chief Complaint   Patient presents with    Abdominal Pain     Pt c/o abdominal pain which started this morning accompanied by vaginal spotting last night. Pt states she had a positive pregnancy test 1 week ago. Pt denies cp, sob, n/v/d.     31-year-old female with a past medical history of hypertension and miscarriage presents to the ED for suprapubic pain.  Patient states this started this morning.  Patient complaining of a stabbing pain that comes and goes, she rates a 7/10.  She has not taken anything to make this better.  She states touch makes it worse.  Patient states her last menstrual period was on .  Patient states she had a positive home pregnancy test 1 week ago.  Patient also admits that hematuria, chills, and nausea.  Patient denies fever, sweats, shortness of breath, chest pain, vomiting, diarrhea, constipation, urinary symptoms, vaginal bleeding, vaginal discharge, vaginal pain, body aches, headaches, dizziness, lightheadedness, and rashes.  Patient states she was just tested for gonorrhea and chlamydia today at her primary care physician.     Review of patient's allergies indicates:  No Known Allergies  Past Medical History:   Diagnosis Date    Hypertension     Miscarriage      Past Surgical History:   Procedure Laterality Date     SECTION       SECTION N/A 10/22/2019    Procedure:  SECTION;  Surgeon: Andrew Barrera MD;  Location: [facility] L&D OR;  Service: OB/GYN;  Laterality: N/A;    FRACTURE SURGERY      left leg    LEG SURGERY      LIPOSUCTION  2022    360 liposuction     Family History   Problem Relation Age of Onset    Hypertension Mother     Hypertension Maternal Grandmother     Kidney disease Maternal Grandmother     Diabetes Maternal Grandmother     Diabetes Paternal Grandmother     Hypertension Paternal Grandmother     Kidney disease Paternal Grandmother     Breast cancer Neg Hx     Colon cancer Neg Hx      "Ovarian cancer Neg Hx      Social History     Tobacco Use    Smoking status: Former     Packs/day: 0.50     Years: 6.00     Pack years: 3.00     Types: Cigarettes    Smokeless tobacco: Former     Quit date: 8/31/2014   Substance Use Topics    Alcohol use: Yes     Comment: "sometimes"    Drug use: No     Review of Systems   Constitutional:  Positive for chills. Negative for diaphoresis and fever.   HENT:  Negative for congestion, rhinorrhea and sore throat.    Respiratory:  Negative for shortness of breath.    Cardiovascular:  Negative for chest pain.   Gastrointestinal:  Positive for abdominal pain (Suprapubic) and nausea. Negative for constipation, diarrhea and vomiting.   Genitourinary:  Positive for hematuria. Negative for decreased urine volume, difficulty urinating, dysuria, frequency, pelvic pain, urgency, vaginal bleeding, vaginal discharge and vaginal pain.   Musculoskeletal:  Negative for arthralgias, back pain and myalgias.   Skin:  Negative for rash.   Neurological:  Negative for dizziness, light-headedness and headaches.     Physical Exam     Initial Vitals [10/10/22 1415]   BP Pulse Resp Temp SpO2   124/60 89 16 98.4 °F (36.9 °C) 99 %      MAP       --         Physical Exam    Nursing note and vitals reviewed.  Constitutional: Vital signs are normal. She appears well-developed and well-nourished. She is not diaphoretic. She is active. She does not appear ill. No distress.   HENT:   Head: Normocephalic and atraumatic.   Right Ear: External ear normal.   Left Ear: External ear normal.   Nose: Nose normal.   Eyes: Conjunctivae, EOM and lids are normal. Pupils are equal, round, and reactive to light. Right eye exhibits no discharge. Left eye exhibits no discharge.   Neck: Neck supple.   Normal range of motion.  Cardiovascular:  Normal rate and regular rhythm.           Pulmonary/Chest: Effort normal and breath sounds normal. No respiratory distress.   Abdominal: Abdomen is soft. She exhibits no distension. " There is abdominal tenderness in the suprapubic area.   Genitourinary:    Uterus normal.   There is no rash, tenderness, lesion or injury on the right labia. There is no rash, tenderness, lesion or injury on the left labia. Cervix exhibits no motion tenderness, no discharge and no friability. Right adnexum displays no mass, no tenderness and no fullness. Left adnexum displays no mass, no tenderness and no fullness.    Vaginal discharge (Copious white) present.      No vaginal erythema, tenderness or bleeding.   No erythema, tenderness or bleeding in the vagina.    No foreign body in the vagina.      No signs of injury in the vagina.     Musculoskeletal:         General: Normal range of motion.      Cervical back: Normal range of motion and neck supple.     Neurological: She is alert and oriented to person, place, and time.   Skin: Skin is dry. Capillary refill takes less than 2 seconds.       ED Course   Procedures  Labs Reviewed   VAGINAL SCREEN - Abnormal; Notable for the following components:       Result Value    Clue Cells Many (*)     WBC - Vaginal Screen Rare (*)     Bacteria - Vaginal Screen Many (*)     All other components within normal limits    Narrative:     Release to patient->Immediate   URINALYSIS, REFLEX TO URINE CULTURE - Abnormal; Notable for the following components:    Appearance, UA Hazy (*)     Protein, UA 1+ (*)     Ketones, UA Trace (*)     Leukocytes, UA 3+ (*)     All other components within normal limits    Narrative:     Specimen Source->Urine   URINALYSIS MICROSCOPIC - Abnormal; Notable for the following components:    WBC, UA >100 (*)     All other components within normal limits    Narrative:     Specimen Source->Urine   COMPREHENSIVE METABOLIC PANEL - Abnormal; Notable for the following components:    Calcium 8.2 (*)     Albumin 3.4 (*)     Alkaline Phosphatase 45 (*)     Anion Gap 4 (*)     All other components within normal limits    Narrative:     Collection has been rescheduled  by CMO at 10/10/2022 16:26 Reason: Md Nielsen would have nurse to draw blood; pt refuse the stick    POCT URINE PREGNANCY - Abnormal; Notable for the following components:    POC Preg Test, Ur Positive (*)     All other components within normal limits   CULTURE, URINE   CBC W/ AUTO DIFFERENTIAL    Narrative:     Release to patient->Immediate   HCG, QUANTITATIVE    Narrative:     Release to patient->Immediate   GROUP & RH          Imaging Results              US OB <14 Wks TransAbd & TransVag, Single Gestation (XPD) (Final result)  Result time 10/10/22 17:51:32      Final result by Lucian Ku MD (10/10/22 17:51:32)                   Impression:      No intrauterine pregnancy or gestational sac visualized, technically pregnancy of unknown location.  Findings may relate to early pregnancy or spontaneous .  No adnexal abnormalities or significant free fluid seen to suggest ectopic pregnancy.  Recommend serial beta hCGs and repeat pelvic ultrasound as clinically warranted.      Electronically signed by: Lucian Ku MD  Date:    10/10/2022  Time:    17:51               Narrative:    EXAMINATION:  US OB <14 WEEKS, TRANSABDOM & TRANSVAG, SINGLE GESTATION (XPD)    CLINICAL HISTORY:  new pregnancy with pain;    TECHNIQUE:  Transabdominal sonography of the pelvis was performed, followed by transvaginal sonography to better evaluate the uterus and ovaries.    COMPARISON:  None.    FINDINGS:  The uterus measures 7 x 4 x 4 cm. Uterine parenchyma is heterogenous in echotexture.  No definite intrauterine pregnancy or intrauterine gestational sac is seen.  Endometrium is normal in thickness measuring 11 mm.    The right ovary measures 4 x 3 x 3 cm. The left ovary measures 2 x 2 x 2 cm. Arterial and venous flow are preserved bilaterally. A suspected corpus luteum cyst measuring 1 cm is seen in the right ovary.  No adnexal abnormalities are seen.  Small volume physiologic pelvic free fluid is seen.                                        Medications   sodium chloride 0.9% bolus 1,000 mL (1,000 mLs Intravenous New Bag 10/10/22 1540)   acetaminophen tablet 500 mg (500 mg Oral Given 10/10/22 1548)     Medical Decision Making:   Initial Assessment:   31-year-old female with a past medical history of hypertension and miscarriage presents to the ED for suprapubic pain.   Patient's chart and medical history reviewed.  Differential Diagnosis:   UTI  Gonorrhea  Chlamydia  Trichomonas  Vaginal yeast infection  Bacterial vaginosis  Pregnancy  Ectopic pregnancy  Ovarian torsion  Clinical Tests:   Lab Tests: Ordered and Reviewed  Radiological Study: Reviewed and Ordered  ED Management:  Patient's vitals reviewed.  She is afebrile, no respiratory distress, nontoxic-appearing in the ED. patient has suprapubic tenderness to palpation.  Pelvic exam showed copious white discharge.  Patient started on fluids and given Tylenol.  Patient denied any nausea medicine.  UPT was positive.  CBC and CMP were unremarkable.  She is O-positive blood type, no need for RhoGAM.  HCG is 325.  UA was remarkable for UTI.  Wet prep showed many clue cells; she will be treated for bacterial vaginosis.  Ultrasound showed no intrauterine pregnancy or gestational sac visualized, technically pregnancy of unknown location.  Findings may relate to early pregnancy or spontaneous .  No adnexal abnormalities or significant free fluid seen to suggest ectopic pregnancy.  Recommend serial beta hCGs and repeat pelvic ultrasound as clinically warranted.  Before I could discuss these results with the patient she left [facility] per nursing staff.  I did not get a chance to talk to her before she left.                         Clinical Impression:   Final diagnoses:  [N39.0] Urinary tract infection without hematuria, site unspecified  [N76.0, B96.89] Bacterial vaginosis  [Z53.29] Left against medical advice (Primary)      ED Disposition Condition    USMAN Mosher  [place], PA-C  10/10/22 1838

## 2022-10-10 NOTE — ED TRIAGE NOTES
"Pt. Reports she has lower abd pain around her pelvic area. Pt. Reports she is pregnant. Pt. Reports she has blood after she urinates and " wraps". Pt. Denies any active vaginal bleeding.   "

## 2022-10-10 NOTE — FIRST PROVIDER EVALUATION
"Medical screening examination initiated.  I have conducted a focused provider triage encounter, findings are as follows:    Brief history of present illness:  30 yo F presents with abdominal pain and vaginal spotting, positive home UPT 1 week ago.     Vitals:    10/10/22 1415   BP: 124/60   BP Location: Right arm   Patient Position: Sitting   Pulse: 89   Resp: 16   Temp: 98.4 °F (36.9 °C)   TempSrc: Oral   SpO2: 99%   Weight: 77.6 kg (171 lb)   Height: 5' 4" (1.626 m)       Pertinent physical exam:  Ambulatory with a steady gait, heart with regular rate and rhythm, lower abdominal tenderness.     Brief workup plan:  UPT, labs    Preliminary workup initiated; this workup will be continued and followed by the physician or advanced practice provider that is assigned to the patient when roomed.  "

## 2022-10-12 LAB — BACTERIA UR CULT: ABNORMAL

## 2024-01-01 NOTE — PROGRESS NOTES
"Went into pt room due to pt screaming and crying. Inconsolable. States "they killed him."  Charge nurse called pt's mother who states "oh they killed him." States that she is coming. Nurse stayed with pt until family arrived. Paged . Dr. Barrera on unit aware of situation. Plan to discharge later today. Social Service consult ordered. Will continue to monitor.   " yes

## 2024-05-16 ENCOUNTER — HOSPITAL ENCOUNTER (EMERGENCY)
Facility: HOSPITAL | Age: 34
Discharge: HOME | End: 2024-05-16
Attending: EMERGENCY MEDICINE
Payer: MEDICAID

## 2024-05-16 VITALS
WEIGHT: 170 LBS | RESPIRATION RATE: 18 BRPM | HEIGHT: 64 IN | SYSTOLIC BLOOD PRESSURE: 119 MMHG | OXYGEN SATURATION: 100 % | BODY MASS INDEX: 29.02 KG/M2 | DIASTOLIC BLOOD PRESSURE: 61 MMHG | TEMPERATURE: 98 F | HEART RATE: 81 BPM

## 2024-05-16 DIAGNOSIS — J02.0 STREP PHARYNGITIS: Primary | ICD-10-CM

## 2024-05-16 LAB
B-HCG UR QL: NEGATIVE
CTP QC/QA: YES
GROUP A STREP, MOLECULAR: POSITIVE
INFLUENZA A, MOLECULAR: NEGATIVE
INFLUENZA B, MOLECULAR: NEGATIVE
SARS-COV-2 RDRP RESP QL NAA+PROBE: NEGATIVE
SPECIMEN SOURCE: NORMAL

## 2024-05-16 PROCEDURE — 63600175 PHARM REV CODE 636 W HCPCS

## 2024-05-16 PROCEDURE — 99283 EMERGENCY DEPT VISIT LOW MDM: CPT

## 2024-05-16 PROCEDURE — 25000003 PHARM REV CODE 250

## 2024-05-16 PROCEDURE — 81025 URINE PREGNANCY TEST: CPT

## 2024-05-16 PROCEDURE — 87502 INFLUENZA DNA AMP PROBE: CPT

## 2024-05-16 PROCEDURE — 87651 STREP A DNA AMP PROBE: CPT

## 2024-05-16 PROCEDURE — U0002 COVID-19 LAB TEST NON-CDC: HCPCS

## 2024-05-16 RX ORDER — AMOXICILLIN 500 MG/1
500 CAPSULE ORAL EVERY 12 HOURS
Qty: 20 CAPSULE | Refills: 0 | Status: SHIPPED | OUTPATIENT
Start: 2024-05-16 | End: 2024-05-26

## 2024-05-16 RX ORDER — ACETAMINOPHEN 500 MG
1000 TABLET ORAL
Status: COMPLETED | OUTPATIENT
Start: 2024-05-16 | End: 2024-05-16

## 2024-05-16 RX ORDER — AMOXICILLIN 250 MG/1
500 CAPSULE ORAL
Status: COMPLETED | OUTPATIENT
Start: 2024-05-16 | End: 2024-05-16

## 2024-05-16 RX ORDER — DEXAMETHASONE 4 MG/1
4 TABLET ORAL
Status: COMPLETED | OUTPATIENT
Start: 2024-05-16 | End: 2024-05-16

## 2024-05-16 RX ADMIN — ACETAMINOPHEN 1000 MG: 500 TABLET ORAL at 08:05

## 2024-05-16 RX ADMIN — AMOXICILLIN 500 MG: 250 CAPSULE ORAL at 09:05

## 2024-05-16 RX ADMIN — DEXAMETHASONE 4 MG: 4 TABLET ORAL at 08:05

## 2024-05-16 NOTE — Clinical Note
"Carolyn "Franklin Pearson was seen and treated in our emergency department on 5/16/2024.  She may return to work on 05/17/2024.       If you have any questions or concerns, please don't hesitate to call.      Mae Huizar PA-C"

## 2024-05-16 NOTE — Clinical Note
"Carolyn"Franklin Pearson was seen and treated in our emergency department on 5/16/2024.  She may return to work on 05/20/2024.       If you have any questions or concerns, please don't hesitate to call.      Mae Huizar PA-C"

## 2024-05-17 NOTE — ED NOTES
Sore throat and pain in both ears x 2 days per patient.     Pain:  Rated 8/10.     Psychosocial:  Patient is calm and cooperative.  Patients insight and judgement are appropriate to situation.  Appears clean, well maintained, with clothing appropriate to environment.  No evidence of delusions, hallucinations, or psychosis.     Neuro:  Eyes open spontaneously.  Awake, alert, oriented x 4.  Speech clear and appropriate.  Tolerating saliva secretions well.  Able to follow commands, demonstrating ability to actively and appropriately communicate within context of current conversation.  Symmetrical facial muscles.  Moving all extremities well with no noted weakness.  Adequate muscle tone present.    Movement is purposeful.         Airway:  Bilateral chest rise and fall.  RR regular and non-labored.         Circulatory:  Skin warm, dry, and pink.  Radial pulses strong and regular.  Capillary refill/skin blanching less than 3 seconds to distal of upper extremities.     Extremities:  No redness, heat, swelling, deformity, or pain.     Skin:  Intact with no bruising/discolorations noted.

## 2024-05-17 NOTE — ED PROVIDER NOTES
"Encounter Date: 2024       History     Chief Complaint   Patient presents with    Sore Throat     C/O sore throat x 2 days.     Patient is a 33 y.o. female who presents with sore throat X 2 days. Also states that she feels as though her "glands are swollen". Patient does not reports close contacts with similar symptoms. Patient has taken ibuprofen for symptom relief.  Denies fever, headache, chest pain, shortness of breath, cough, wheezing, stridor, drooling, NVD, abdominal pain, constipation, urinary problems, joint problems, rashes, or any other complaints at this time.      The history is provided by the patient.     Review of patient's allergies indicates:  No Known Allergies  Past Medical History:   Diagnosis Date    Hypertension     Miscarriage      Past Surgical History:   Procedure Laterality Date     SECTION       SECTION N/A 10/22/2019    Procedure:  SECTION;  Surgeon: Andrew Barrera MD;  Location: [facility] L&D OR;  Service: OB/GYN;  Laterality: N/A;    FRACTURE SURGERY      left leg    LEG SURGERY      LIPOSUCTION  2022    360 liposuction     Family History   Problem Relation Name Age of Onset    Hypertension Mother      Hypertension Maternal Grandmother      Kidney disease Maternal Grandmother      Diabetes Maternal Grandmother      Diabetes Paternal Grandmother      Hypertension Paternal Grandmother      Kidney disease Paternal Grandmother      Breast cancer Neg Hx      Colon cancer Neg Hx      Ovarian cancer Neg Hx       Social History     Tobacco Use    Smoking status: Former     Current packs/day: 0.50     Average packs/day: 0.5 packs/day for 6.0 years (3.0 ttl pk-yrs)     Types: Cigarettes    Smokeless tobacco: Former     Quit date: 2014   Substance Use Topics    Alcohol use: Yes     Comment: "sometimes"    Drug use: No     Review of Systems   Constitutional:  Negative for chills and fever.   HENT:  Positive for sore throat. Negative for congestion, drooling, ear " discharge, ear pain, facial swelling, postnasal drip, rhinorrhea, trouble swallowing and voice change.    Respiratory:  Negative for cough, shortness of breath and wheezing.    Cardiovascular:  Negative for chest pain.   Gastrointestinal:  Negative for abdominal distention, abdominal pain, diarrhea, nausea and vomiting.   Genitourinary:  Negative for dysuria.   Musculoskeletal:  Negative for back pain, neck pain and neck stiffness.   Skin:  Negative for rash.   Neurological:  Negative for weakness and headaches.   Hematological:  Does not bruise/bleed easily.       Physical Exam     Initial Vitals [05/16/24 2023]   BP Pulse Resp Temp SpO2   119/61 81 18 98.3 °F (36.8 °C) 100 %      MAP       --         Physical Exam    Vitals reviewed.  Constitutional: She appears well-developed and well-nourished.   HENT:   Head: Normocephalic and atraumatic.   Nose: Rhinorrhea present.   Mouth/Throat: Uvula is midline. No trismus in the jaw. Oropharyngeal exudate, posterior oropharyngeal edema and posterior oropharyngeal erythema present.   2+ tonsils, bilaterally.  Tonsils symmetrical. Tonsils are erythematous.  There is tonsillar exudate.  No apparent PTA. Uvula midline.  There is not cervical adenopathy. No Jm's angina.     Eyes: Conjunctivae and EOM are normal.   Neck: Neck supple.   Normal range of motion.   Full passive range of motion without pain.     Cardiovascular:  Normal rate and regular rhythm.           Pulmonary/Chest: Breath sounds normal. No stridor. No respiratory distress. She has no wheezes. She has no rhonchi. She has no rales. She exhibits no tenderness.   Abdominal: Abdomen is soft. She exhibits no distension. There is no abdominal tenderness. There is no rebound and no guarding.   Musculoskeletal:      Cervical back: Full passive range of motion without pain, normal range of motion and neck supple. No rigidity.     Lymphadenopathy:     She has no cervical adenopathy.   Neurological: She is alert and  oriented to person, place, and time.         ED Course   Procedures  Labs Reviewed   GROUP A STREP, MOLECULAR - Abnormal; Notable for the following components:       Result Value    Group A Strep, Molecular Positive (*)     All other components within normal limits   INFLUENZA A & B BY MOLECULAR   SARS-COV-2 RNA AMPLIFICATION, QUAL   POCT URINE PREGNANCY          Imaging Results    None          Medications   dexAMETHasone tablet 4 mg (4 mg Oral Given 5/16/24 2046)   acetaminophen tablet 1,000 mg (1,000 mg Oral Given 5/16/24 2046)   amoxicillin capsule 500 mg (500 mg Oral Given 5/16/24 2111)     Medical Decision Making  Patient is well-appearing, afebrile 33 y.o. female. VSS and do not suggest sepsis. Denies chest pain, SOB, wheezing, difficulty swallowing, neck pain, neck stiffness. Lung sounds are clear and not consistent with pneumonia. There is no neck pain or limited ROM to suggest retropharyngeal abscess or meningitis. Tolerating PO, non-toxic appearing, no hypoxia. The patient remained comfortable and stable during their visit in the ED.  Details of ED course documented in ED workup.     Differential Diagnosis includes, but is not limited to: COVID, influenza, viral URI, bacterial/viral pharyngitis, pneumonia, PTA, sinusitis    All historical, clinical, and laboratory findings reviewed. COVID test negative. Influenza test negative. Strep test positive.Patient with constellation of symptoms most consistent strep pharyngitis.  Patient given dose of amoxicillin and Decadron in ED.  Given Rx or amoxicillin.  There are no concerning features on physical exam to suggest an emergent or life threatening condition or an invasive bacterial infection, including, but not limited to: peritonsillar abscess, retropharyngeal abscess. No further intervention is indicated at this time. The patient is at low risk for an emergent/life threatening medical condition at this time, and I am of the belief that that it is safe to  discharge the patient from the emergency department.     Patient instructed to follow up with PCP in 2-3 days for recheck of today's complaints. Patient has been counseled regarding the need for follow-up as well as the indications to return to the emergency room should new or worrisome developments. Discharge and follow-up instructions discussed with the patient who expressed understanding and willingness to comply with recommendations. Patient discharged from the emergency department in stable condition, in no acute distress.     Amount and/or Complexity of Data Reviewed  Labs: ordered. Decision-making details documented in ED Course.    Risk  OTC drugs.  Prescription drug management.               ED Course as of 05/16/24 2151   Thu May 16, 2024   2055 hCG Qualitative, Urine: Negative [OB]   2100 Group A Strep, Molecular(!): Positive [OB]      ED Course User Index  [OB] Mae Huizar PA-C                             Clinical Impression:  Final diagnoses:  [J02.0] Strep pharyngitis (Primary)          ED Disposition Condition    Discharge Stable          ED Prescriptions       Medication Sig Dispense Start Date End Date Auth. Provider    amoxicillin (AMOXIL) 500 MG capsule Take 1 capsule (500 mg total) by mouth every 12 (twelve) hours. for 10 days 20 capsule 5/16/2024 5/26/2024 Mae Huizar PA-C          Follow-up Information    None          Mae Huizar PA-C  05/16/24 2151

## 2024-07-10 ENCOUNTER — OFFICE VISIT (OUTPATIENT)
Dept: OBSTETRICS AND GYNECOLOGY | Facility: CLINIC | Age: 34
End: 2024-07-10
Payer: MEDICAID

## 2024-07-10 VITALS
HEIGHT: 64 IN | SYSTOLIC BLOOD PRESSURE: 102 MMHG | BODY MASS INDEX: 31.99 KG/M2 | DIASTOLIC BLOOD PRESSURE: 62 MMHG | WEIGHT: 187.38 LBS

## 2024-07-10 DIAGNOSIS — Z30.09 FAMILY PLANNING EDUCATION, GUIDANCE, AND COUNSELING: ICD-10-CM

## 2024-07-10 DIAGNOSIS — Z12.4 CERVICAL CANCER SCREENING: ICD-10-CM

## 2024-07-10 DIAGNOSIS — Z01.419 WELL WOMAN EXAM WITH ROUTINE GYNECOLOGICAL EXAM: Primary | ICD-10-CM

## 2024-07-10 PROCEDURE — 3008F BODY MASS INDEX DOCD: CPT | Mod: CPTII,,, | Performed by: OBSTETRICS & GYNECOLOGY

## 2024-07-10 PROCEDURE — 99999 PR PBB SHADOW E&M-EST. PATIENT-LVL III: CPT | Mod: PBBFAC,,, | Performed by: OBSTETRICS & GYNECOLOGY

## 2024-07-10 PROCEDURE — 99459 PELVIC EXAMINATION: CPT | Mod: PBBFAC | Performed by: OBSTETRICS & GYNECOLOGY

## 2024-07-10 PROCEDURE — 99459 PELVIC EXAMINATION: CPT | Mod: S$PBB,,, | Performed by: OBSTETRICS & GYNECOLOGY

## 2024-07-10 PROCEDURE — 3078F DIAST BP <80 MM HG: CPT | Mod: CPTII,,, | Performed by: OBSTETRICS & GYNECOLOGY

## 2024-07-10 PROCEDURE — 99395 PREV VISIT EST AGE 18-39: CPT | Mod: S$PBB,,, | Performed by: OBSTETRICS & GYNECOLOGY

## 2024-07-10 PROCEDURE — 1160F RVW MEDS BY RX/DR IN RCRD: CPT | Mod: CPTII,,, | Performed by: OBSTETRICS & GYNECOLOGY

## 2024-07-10 PROCEDURE — 1159F MED LIST DOCD IN RCRD: CPT | Mod: CPTII,,, | Performed by: OBSTETRICS & GYNECOLOGY

## 2024-07-10 PROCEDURE — 99213 OFFICE O/P EST LOW 20 MIN: CPT | Mod: PBBFAC | Performed by: OBSTETRICS & GYNECOLOGY

## 2024-07-10 PROCEDURE — 3074F SYST BP LT 130 MM HG: CPT | Mod: CPTII,,, | Performed by: OBSTETRICS & GYNECOLOGY

## 2024-07-10 NOTE — PROGRESS NOTES
"[facility]  Ambulatory Clinic  Obstetrics & Gynecology    Visit Date:  7/10/2024    Chief Complaint:  Annual GYN exam    History of Present Illness:      Carolyn Pearson is a 33 y.o.  here for a gynecologic exam.      Pt has no major complaints today.      Menses are regular, not heavy or painful.    Pt current method of family planning is natural family planning.    Pt is requesting Nexplanon.    Last pap ~ was benign.    Pt denies abnormal vaginal bleeding, vaginal discharge, dysmenorrhea, dyspareunia, pelvic pain, bloating, early satiety, unintentional weight loss, breast mass/skin changes, incontinence, GI or urinary complaints.      Otherwise, the pt is in her usual state of health.    Past History:  Gynecologic history as noted above.    Review of Systems:      GENERAL:  No fever, fatigue, excessive weight gain or loss  HEENT:  No headaches, hearing changes, visual disturbance  RESPIRATORY:  No cough, shortness of breath  CARDIOVASCULAR:  No chest pain, heart palpitations, leg swelling  BREAST:  No lump, pain, nipple discharge, skin changes  GASTROINTESTINAL:  No nausea, vomiting, constipation, diarrhea, abd pain, rectal bleeding   GENITOURINARY:  See HPI  ENDOCRINE:  No heat or cold intolerance  HEMATOLOGIC:  No easy bruisability or bleeding   LYMPHATICS:  No enlarged nodes  MUSCULOSKELETAL:  No acute joint pain or swelling  SKIN:  No rash, lesions, jaundice  NEUROLOGIC:  No dizziness, weakness, syncope  PSYCHIATRIC:  No significant mood changes, homicidal/suicidal ideations, abuse    Physical Exam:     /62   Ht 5' 4" (1.626 m)   Wt 85 kg (187 lb 6.3 oz)   LMP 2024   BMI 32.17 kg/m²   Pulse 60's, Resp rate 12     GENERAL:  No acute distress, well-nourished  HEENT:  Atraumatic, anicteric, moist mucus membranes. Neck supple w/o masses.  BREAST:  Symmetric, nontender, no obvious masses, adenopathy, skin changes or nipple discharge.  LUNGS:  Clear normal " respiratory effort  HEART:  Regular rate and rhythm  ABDOMEN:  Soft, non-tender, non-distended, normoactive bowel sounds, no obvious organomegaly  EXT:  Symmetric w/o cramping, claudication, or edema. +2 distal pulses.  SKIN:  No rashes or bruising  PSYCH:  Mood and affect appropriate  NEURO:  Grossly intact bilaterally    GENITOURINARY:    VULVAR:  Female external genitalia w/o obvious lesions. Female hair distribution. Normal urethral meatus. No gross lymphadenopathy.    VAGINA:  Normal vaginal mucosa. Good support. No obvious lesion. No discharge.  CERVIX:  No cervical motion tenderness, discharge, or obvious lesions.   UTERUS:  Small, non-tender, normal contour  ADNEXA:  No masses, non-tender    RECTAL:  Deferred. No obvious external lesions    Chaperone present for exam.    Assessment:     33 y.o. :    Well woman gynecologic exam  Family planning - order Nexplanon    Plan:    A gynecologic health assessment was performed with age appropriate counseling.    Cervical cancer screening - pap obtained.    We discussed in detail her contraceptive options.  After an extensive discussion, pt is requesting Nexplanon.  Risks, benefits, and alternatives to Nexplanon discussed.  Will order Nexplanon pending insurance benefits verification process.      Encourage healthy lifestyle modifications, monthly self breast exams, and f/u with PCP for health maintenance.    Will schedule pt for Nexplanon insertion once received by office.     Return sooner as needed.  All questions answered, pt voiced understanding.        Andrew Barrera MD

## (undated) DEVICE — DRESSING TELFA ADH ISL 4X8IN

## (undated) DEVICE — ELECTRODE REM PLYHSV RETURN 9

## (undated) DEVICE — PAD SANITARY OB STERILE

## (undated) DEVICE — SEE MEDLINE ITEM 157117

## (undated) DEVICE — SEE L#120831

## (undated) DEVICE — Device

## (undated) DEVICE — SUPPORT ULNA NERVE PROTECTOR

## (undated) DEVICE — SEE MEDLINE ITEM 156946

## (undated) DEVICE — VACURETTE 8MM CURVED

## (undated) DEVICE — PAD PREP 50/CA

## (undated) DEVICE — SEE MEDLINE ITEM 154981

## (undated) DEVICE — SEE MEDLINE ITEM 157181

## (undated) DEVICE — DRESSING TELFA N ADH 3X8

## (undated) DEVICE — SET DISPOSABLE COLLECTION

## (undated) DEVICE — CONTAINER SPECIMEN STRL 4OZ

## (undated) DEVICE — CATH SELF-CATH FEMALE 14FR 6IN